# Patient Record
Sex: FEMALE | Race: WHITE | NOT HISPANIC OR LATINO | Employment: OTHER | URBAN - METROPOLITAN AREA
[De-identification: names, ages, dates, MRNs, and addresses within clinical notes are randomized per-mention and may not be internally consistent; named-entity substitution may affect disease eponyms.]

---

## 2023-10-31 ENCOUNTER — APPOINTMENT (OUTPATIENT)
Dept: CT IMAGING | Facility: HOSPITAL | Age: 78
End: 2023-10-31
Payer: MEDICARE

## 2023-10-31 ENCOUNTER — APPOINTMENT (OUTPATIENT)
Dept: GENERAL RADIOLOGY | Facility: HOSPITAL | Age: 78
End: 2023-10-31
Payer: MEDICARE

## 2023-10-31 ENCOUNTER — HOSPITAL ENCOUNTER (INPATIENT)
Facility: HOSPITAL | Age: 78
LOS: 6 days | Discharge: NURSING, REHAB OR LONG-TERM CARE | End: 2023-11-07
Attending: STUDENT IN AN ORGANIZED HEALTH CARE EDUCATION/TRAINING PROGRAM | Admitting: INTERNAL MEDICINE
Payer: MEDICARE

## 2023-10-31 DIAGNOSIS — I71.40 ABDOMINAL AORTIC ANEURYSM (AAA) WITHOUT RUPTURE, UNSPECIFIED PART: ICD-10-CM

## 2023-10-31 DIAGNOSIS — M54.16 LUMBAR RADICULOPATHY: ICD-10-CM

## 2023-10-31 DIAGNOSIS — R91.1 LUNG NODULE: ICD-10-CM

## 2023-10-31 DIAGNOSIS — R29.6 MULTIPLE FALLS: ICD-10-CM

## 2023-10-31 DIAGNOSIS — R29.898 BILATERAL LEG WEAKNESS: Primary | ICD-10-CM

## 2023-10-31 LAB
ALBUMIN SERPL-MCNC: 4.1 G/DL (ref 3.5–5.2)
ALBUMIN/GLOB SERPL: 1.5 G/DL
ALP SERPL-CCNC: 88 U/L (ref 39–117)
ALT SERPL W P-5'-P-CCNC: 15 U/L (ref 1–33)
ANION GAP SERPL CALCULATED.3IONS-SCNC: 9.3 MMOL/L (ref 5–15)
AST SERPL-CCNC: 25 U/L (ref 1–32)
BACTERIA UR QL AUTO: ABNORMAL /HPF
BASOPHILS # BLD AUTO: 0.04 10*3/MM3 (ref 0–0.2)
BASOPHILS NFR BLD AUTO: 0.4 % (ref 0–1.5)
BILIRUB SERPL-MCNC: 0.6 MG/DL (ref 0–1.2)
BILIRUB UR QL STRIP: NEGATIVE
BUN SERPL-MCNC: 21 MG/DL (ref 8–23)
BUN/CREAT SERPL: 25.9 (ref 7–25)
CALCIUM SPEC-SCNC: 10.5 MG/DL (ref 8.6–10.5)
CHLORIDE SERPL-SCNC: 102 MMOL/L (ref 98–107)
CLARITY UR: CLEAR
CO2 SERPL-SCNC: 26.7 MMOL/L (ref 22–29)
COLOR UR: YELLOW
CREAT SERPL-MCNC: 0.81 MG/DL (ref 0.57–1)
DEPRECATED RDW RBC AUTO: 43.4 FL (ref 37–54)
EGFRCR SERPLBLD CKD-EPI 2021: 74.4 ML/MIN/1.73
EOSINOPHIL # BLD AUTO: 0 10*3/MM3 (ref 0–0.4)
EOSINOPHIL NFR BLD AUTO: 0 % (ref 0.3–6.2)
ERYTHROCYTE [DISTWIDTH] IN BLOOD BY AUTOMATED COUNT: 13.4 % (ref 12.3–15.4)
GLOBULIN UR ELPH-MCNC: 2.8 GM/DL
GLUCOSE SERPL-MCNC: 145 MG/DL (ref 65–99)
GLUCOSE UR STRIP-MCNC: NEGATIVE MG/DL
HCT VFR BLD AUTO: 45.7 % (ref 34–46.6)
HGB BLD-MCNC: 15.3 G/DL (ref 12–15.9)
HGB UR QL STRIP.AUTO: ABNORMAL
HYALINE CASTS UR QL AUTO: ABNORMAL /LPF
IMM GRANULOCYTES # BLD AUTO: 0.04 10*3/MM3 (ref 0–0.05)
IMM GRANULOCYTES NFR BLD AUTO: 0.4 % (ref 0–0.5)
KETONES UR QL STRIP: NEGATIVE
LEUKOCYTE ESTERASE UR QL STRIP.AUTO: NEGATIVE
LYMPHOCYTES # BLD AUTO: 0.78 10*3/MM3 (ref 0.7–3.1)
LYMPHOCYTES NFR BLD AUTO: 7.6 % (ref 19.6–45.3)
MCH RBC QN AUTO: 29.7 PG (ref 26.6–33)
MCHC RBC AUTO-ENTMCNC: 33.5 G/DL (ref 31.5–35.7)
MCV RBC AUTO: 88.7 FL (ref 79–97)
MONOCYTES # BLD AUTO: 0.8 10*3/MM3 (ref 0.1–0.9)
MONOCYTES NFR BLD AUTO: 7.8 % (ref 5–12)
NEUTROPHILS NFR BLD AUTO: 8.57 10*3/MM3 (ref 1.7–7)
NEUTROPHILS NFR BLD AUTO: 83.8 % (ref 42.7–76)
NITRITE UR QL STRIP: NEGATIVE
NRBC BLD AUTO-RTO: 0 /100 WBC (ref 0–0.2)
PH UR STRIP.AUTO: 6.5 [PH] (ref 5–8)
PLATELET # BLD AUTO: 160 10*3/MM3 (ref 140–450)
PMV BLD AUTO: 11.7 FL (ref 6–12)
POTASSIUM SERPL-SCNC: 4.1 MMOL/L (ref 3.5–5.2)
PROT SERPL-MCNC: 6.9 G/DL (ref 6–8.5)
PROT UR QL STRIP: ABNORMAL
QT INTERVAL: 386 MS
QTC INTERVAL: 459 MS
RBC # BLD AUTO: 5.15 10*6/MM3 (ref 3.77–5.28)
RBC # UR STRIP: ABNORMAL /HPF
REF LAB TEST METHOD: ABNORMAL
SODIUM SERPL-SCNC: 138 MMOL/L (ref 136–145)
SP GR UR STRIP: 1.02 (ref 1–1.03)
SQUAMOUS #/AREA URNS HPF: ABNORMAL /HPF
UROBILINOGEN UR QL STRIP: ABNORMAL
WBC # UR STRIP: ABNORMAL /HPF
WBC NRBC COR # BLD: 10.23 10*3/MM3 (ref 3.4–10.8)

## 2023-10-31 PROCEDURE — 99285 EMERGENCY DEPT VISIT HI MDM: CPT

## 2023-10-31 PROCEDURE — 73562 X-RAY EXAM OF KNEE 3: CPT

## 2023-10-31 PROCEDURE — G0378 HOSPITAL OBSERVATION PER HR: HCPCS

## 2023-10-31 PROCEDURE — 81001 URINALYSIS AUTO W/SCOPE: CPT | Performed by: STUDENT IN AN ORGANIZED HEALTH CARE EDUCATION/TRAINING PROGRAM

## 2023-10-31 PROCEDURE — 73502 X-RAY EXAM HIP UNI 2-3 VIEWS: CPT

## 2023-10-31 PROCEDURE — 80053 COMPREHEN METABOLIC PANEL: CPT | Performed by: STUDENT IN AN ORGANIZED HEALTH CARE EDUCATION/TRAINING PROGRAM

## 2023-10-31 PROCEDURE — 72125 CT NECK SPINE W/O DYE: CPT

## 2023-10-31 PROCEDURE — 72131 CT LUMBAR SPINE W/O DYE: CPT

## 2023-10-31 PROCEDURE — 71275 CT ANGIOGRAPHY CHEST: CPT

## 2023-10-31 PROCEDURE — 85025 COMPLETE CBC W/AUTO DIFF WBC: CPT | Performed by: STUDENT IN AN ORGANIZED HEALTH CARE EDUCATION/TRAINING PROGRAM

## 2023-10-31 PROCEDURE — 93010 ELECTROCARDIOGRAM REPORT: CPT | Performed by: INTERNAL MEDICINE

## 2023-10-31 PROCEDURE — 25510000001 IOPAMIDOL PER 1 ML: Performed by: STUDENT IN AN ORGANIZED HEALTH CARE EDUCATION/TRAINING PROGRAM

## 2023-10-31 PROCEDURE — 70450 CT HEAD/BRAIN W/O DYE: CPT

## 2023-10-31 PROCEDURE — 93005 ELECTROCARDIOGRAM TRACING: CPT | Performed by: STUDENT IN AN ORGANIZED HEALTH CARE EDUCATION/TRAINING PROGRAM

## 2023-10-31 PROCEDURE — 74174 CTA ABD&PLVS W/CONTRAST: CPT

## 2023-10-31 PROCEDURE — 72128 CT CHEST SPINE W/O DYE: CPT

## 2023-10-31 RX ORDER — BISACODYL 5 MG/1
5 TABLET, DELAYED RELEASE ORAL DAILY PRN
Status: DISCONTINUED | OUTPATIENT
Start: 2023-10-31 | End: 2023-11-07 | Stop reason: HOSPADM

## 2023-10-31 RX ORDER — BISACODYL 10 MG
10 SUPPOSITORY, RECTAL RECTAL DAILY PRN
Status: DISCONTINUED | OUTPATIENT
Start: 2023-10-31 | End: 2023-11-07 | Stop reason: HOSPADM

## 2023-10-31 RX ORDER — ACETAMINOPHEN 325 MG/1
650 TABLET ORAL EVERY 4 HOURS PRN
Status: DISCONTINUED | OUTPATIENT
Start: 2023-10-31 | End: 2023-11-01

## 2023-10-31 RX ORDER — ACETAMINOPHEN 160 MG/5ML
650 SOLUTION ORAL EVERY 4 HOURS PRN
Status: DISCONTINUED | OUTPATIENT
Start: 2023-10-31 | End: 2023-11-01

## 2023-10-31 RX ORDER — ACETAMINOPHEN 650 MG/1
650 SUPPOSITORY RECTAL EVERY 4 HOURS PRN
Status: DISCONTINUED | OUTPATIENT
Start: 2023-10-31 | End: 2023-11-01

## 2023-10-31 RX ORDER — ONDANSETRON 2 MG/ML
4 INJECTION INTRAMUSCULAR; INTRAVENOUS EVERY 6 HOURS PRN
Status: DISCONTINUED | OUTPATIENT
Start: 2023-10-31 | End: 2023-11-07 | Stop reason: HOSPADM

## 2023-10-31 RX ORDER — POLYETHYLENE GLYCOL 3350 17 G/17G
17 POWDER, FOR SOLUTION ORAL DAILY PRN
Status: DISCONTINUED | OUTPATIENT
Start: 2023-10-31 | End: 2023-11-07 | Stop reason: HOSPADM

## 2023-10-31 RX ORDER — AMOXICILLIN 250 MG
2 CAPSULE ORAL 2 TIMES DAILY
Status: DISCONTINUED | OUTPATIENT
Start: 2023-10-31 | End: 2023-11-07 | Stop reason: HOSPADM

## 2023-10-31 RX ADMIN — IOPAMIDOL 95 ML: 755 INJECTION, SOLUTION INTRAVENOUS at 16:12

## 2023-10-31 NOTE — Clinical Note
Level of Care: Med/Surg [1]   Diagnosis: Multiple falls [082911]   Admitting Physician: MIREILLE SUNSHINE [[id number]]   Attending Physician: MIREILLE SUNSHINE [[id number]]   Certification: I Certify That Inpatient Hospital Services Are Medically Necessary For Greater Than 2 Midnights

## 2023-10-31 NOTE — ED TRIAGE NOTES
Patient to ED per EMS from home w/ reports of 3 times throughout the night; hit head on dresser, abrasion to L shin, reports back pain. Patient denies blood thinner use, nausea. Patient states she is unsure if she had a syncopal episode last night; reports increased weakness over the last several months.

## 2023-10-31 NOTE — ED PROVIDER NOTES
EMERGENCY DEPARTMENT ENCOUNTER    Room Number:  39/39  PCP: Provider, No Known  History obtained from: Patient, family      HPI:  Chief Complaint: Falls  A complete HPI/ROS/PMH/PSH/SH/FH are unobtainable due to: Not applicable  Context: Kirti Santillan is a 77 y.o. female who presents to the ED c/o multiple falls.  Had 3 falls today.  Thinks she lost her balance.  Family reports she has been increasingly weak and having difficulty ambulating even with a walker.  Denies chest pain or shortness of breath.  Does report hitting her head and diffuse back pain.  Also left knee and left hip pain.            PAST MEDICAL HISTORY  Active Ambulatory Problems     Diagnosis Date Noted    No Active Ambulatory Problems     Resolved Ambulatory Problems     Diagnosis Date Noted    No Resolved Ambulatory Problems     No Additional Past Medical History         PAST SURGICAL HISTORY  No past surgical history on file.      FAMILY HISTORY  No family history on file.      SOCIAL HISTORY  Social History     Socioeconomic History    Marital status:          ALLERGIES  Patient has no known allergies.        REVIEW OF SYSTEMS    As per HPI      PHYSICAL EXAM  ED Triage Vitals [10/31/23 1253]   Temp Heart Rate Resp BP SpO2   98.9 °F (37.2 °C) 86 20 106/77 97 %      Temp src Heart Rate Source Patient Position BP Location FiO2 (%)   -- -- -- -- --       Physical Exam  Constitutional:       General: She is not in acute distress.  HENT:      Head: Normocephalic and atraumatic.   Cardiovascular:      Rate and Rhythm: Normal rate and regular rhythm.   Pulmonary:      Effort: Pulmonary effort is normal. No respiratory distress.   Abdominal:      General: There is no distension.      Palpations: Abdomen is soft.      Tenderness: There is no abdominal tenderness.   Musculoskeletal:         General: Tenderness present. No swelling or deformity.      Comments: Tenderness to palpation over bilateral hips, left greater than right and left knee.   Also diffuse tenderness over the spines including cervical, thoracic, lumbar.  Worst over the thoracic spine.   Skin:     General: Skin is warm and dry.   Neurological:      Mental Status: She is alert. Mental status is at baseline.           Vital signs and nursing notes reviewed.          LAB RESULTS  Recent Results (from the past 24 hour(s))   Comprehensive Metabolic Panel    Collection Time: 10/31/23  1:02 PM    Specimen: Blood   Result Value Ref Range    Glucose 145 (H) 65 - 99 mg/dL    BUN 21 8 - 23 mg/dL    Creatinine 0.81 0.57 - 1.00 mg/dL    Sodium 138 136 - 145 mmol/L    Potassium 4.1 3.5 - 5.2 mmol/L    Chloride 102 98 - 107 mmol/L    CO2 26.7 22.0 - 29.0 mmol/L    Calcium 10.5 8.6 - 10.5 mg/dL    Total Protein 6.9 6.0 - 8.5 g/dL    Albumin 4.1 3.5 - 5.2 g/dL    ALT (SGPT) 15 1 - 33 U/L    AST (SGOT) 25 1 - 32 U/L    Alkaline Phosphatase 88 39 - 117 U/L    Total Bilirubin 0.6 0.0 - 1.2 mg/dL    Globulin 2.8 gm/dL    A/G Ratio 1.5 g/dL    BUN/Creatinine Ratio 25.9 (H) 7.0 - 25.0    Anion Gap 9.3 5.0 - 15.0 mmol/L    eGFR 74.4 >60.0 mL/min/1.73   CBC Auto Differential    Collection Time: 10/31/23  1:02 PM    Specimen: Blood   Result Value Ref Range    WBC 10.23 3.40 - 10.80 10*3/mm3    RBC 5.15 3.77 - 5.28 10*6/mm3    Hemoglobin 15.3 12.0 - 15.9 g/dL    Hematocrit 45.7 34.0 - 46.6 %    MCV 88.7 79.0 - 97.0 fL    MCH 29.7 26.6 - 33.0 pg    MCHC 33.5 31.5 - 35.7 g/dL    RDW 13.4 12.3 - 15.4 %    RDW-SD 43.4 37.0 - 54.0 fl    MPV 11.7 6.0 - 12.0 fL    Platelets 160 140 - 450 10*3/mm3    Neutrophil % 83.8 (H) 42.7 - 76.0 %    Lymphocyte % 7.6 (L) 19.6 - 45.3 %    Monocyte % 7.8 5.0 - 12.0 %    Eosinophil % 0.0 (L) 0.3 - 6.2 %    Basophil % 0.4 0.0 - 1.5 %    Immature Grans % 0.4 0.0 - 0.5 %    Neutrophils, Absolute 8.57 (H) 1.70 - 7.00 10*3/mm3    Lymphocytes, Absolute 0.78 0.70 - 3.10 10*3/mm3    Monocytes, Absolute 0.80 0.10 - 0.90 10*3/mm3    Eosinophils, Absolute 0.00 0.00 - 0.40 10*3/mm3    Basophils,  Absolute 0.04 0.00 - 0.20 10*3/mm3    Immature Grans, Absolute 0.04 0.00 - 0.05 10*3/mm3    nRBC 0.0 0.0 - 0.2 /100 WBC   ECG 12 Lead Syncope    Collection Time: 10/31/23  3:19 PM   Result Value Ref Range    QT Interval 386 ms    QTC Interval 459 ms   Urinalysis With Culture If Indicated - Urine, Clean Catch    Collection Time: 10/31/23  3:32 PM    Specimen: Urine, Clean Catch   Result Value Ref Range    Color, UA Yellow Yellow, Straw    Appearance, UA Clear Clear    pH, UA 6.5 5.0 - 8.0    Specific Gravity, UA 1.022 1.005 - 1.030    Glucose, UA Negative Negative    Ketones, UA Negative Negative    Bilirubin, UA Negative Negative    Blood, UA Moderate (2+) (A) Negative    Protein, UA 30 mg/dL (1+) (A) Negative    Leuk Esterase, UA Negative Negative    Nitrite, UA Negative Negative    Urobilinogen, UA 1.0 E.U./dL 0.2 - 1.0 E.U./dL       Ordered the above labs and reviewed the results.        RADIOLOGY  CT Head Without Contrast, CT Cervical Spine Without Contrast, CT Thoracic Spine Without Contrast, CT Lumbar Spine Without Contrast    Result Date: 10/31/2023  CT HEAD, CERVICAL/THORACIC/LUMBAR SPINE WITHOUT CONTRAST  HISTORY: Fall, pain in above areas.  COMPARISON: None.  CT HEAD WITHOUT CONTRAST:  The brain and ventricles are symmetrical. There is no evidence of intracranial hemorrhage, hydrocephalus or of abnormal extra-axial fluid. Moderate small vessel ischemic disease is noted. A lacunar infarct is appreciated involving the thalamic nuclei bilaterally each measuring approximately 5 mm in size, age indeterminate. A lacunar infarct involving the body of the caudate nucleus on the right is appreciated measuring approximately 7 mm in diameter. No obvious area of decreased attenuation to suggest an acute infarction is identified.      There is no evidence of fracture or of intracranial hemorrhage. Atrophy, small vessel ischemic disease and age-indeterminate lacunar infarcts involving the thalamic nuclei are noted  bilaterally. Further evaluation could be performed with a MRI examination of the brain as indicated.  CT EXAMINATAION OF THE CERVICAL SPINE WITHOUT CONTRAST:  Moderate-to-severe degenerative disease is noted at C1-C2 anteriorly. A moderate pannus is noted posterior to the dens. There is fusion of the facets to the left at C2-3. There is a grade 1 anterolisthesis of C4 upon C5 estimated to be 2 mm. Moderate-to-severe loss of disc height and moderate endplate degenerative changes are noted at C6-7. There is no evidence of prevertebral edema or of fracture.  C2-3: There is no evidence of disc bulge or herniation.  C3-4: A mild central disc bulge is present. Moderate facet degenerative disease is present bilaterally.  C4-5: Moderate-to-severe facet degenerative disease is present on the right. Moderate foraminal stenosis is present on the right secondary to facet hypertrophy and uncovertebral degenerative disease.  C5-6: There is no evidence of disc bulge or herniation.  C6-7: Beam-hardening artifact from the patient's shoulders limits evaluation of the cervical spine and spinal canal. A broad-based disc osteophyte complex is present resulting in flattening of the ventral surface of the thecal sac.  C7-T1: There is no evidence of disc bulge or herniation.  IMPRESSION: Beam-hardening artifact limits evaluation of the lower cervical spine. Multilevel degenerative disease involving the cervical spine is noted as described above. There is no evidence of fracture. See above.  CT EXAMINATION OF THE THORACIC SPINE WITHOUT CONTRAST:  There is a spiculated nodule appreciated involving the right upper lobe posterolaterally measuring approximately 7 mm in size. The lungs are only partially visualized.  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 Vacuum disc effect is appreciated from T11 to L1. There is no evidence of a compression fracture or of compression deformity. Evaluation of the spinal canal is limited by  technique but no obvious disc herniation is appreciated.  IMPRESSION: 1.  Multilevel degenerative disease involving the thoracic spine is noted as described above. This includes anterior ankylosis from T4 to T11. No obvious fracture is identified. Further evaluation could be performed with MRI examination of the a thoracic spine as indicated. 2.  There is partial visualization of the lungs. However, there is a spiculated nodule involving the right upper lobe posterolaterally measuring 7 mm in size. Comparison with prior studies is recommended. If no prior study is available for comparison, a dedicated CT examination of the chest is strongly recommended.  CT EXAMINATION OF THE LUMBAR SPINE WITHOUT CONTRAST:  There is a large fusiform infrarenal abdominal aortic aneurysm appreciated measuring approximately 4.1 cm in the AP dimension and 4.6 cm in THE transverse dimension. 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 This measures approximately 4 mm in the AP dimension, 14 mm in the craniocaudal dimension and 13 mm in the transverse dimension.  The adrenal glands are prominent bilaterally more so on the left and low in attenuation consistent with adrenal adenomas.  There is severe loss of disc height at L4-L5. Anterolisthesis of L3 upon L4 is appreciated estimated to be approximately 4 mm.  L1-L2: A broad-based disc osteophyte complex is present resulting in mild flattening of the ventral surface of the thecal sac.  L2-L3: There is moderate-to-severe central canal stenosis and severe lateral recess narrowing bilaterally secondary to anterolisthesis of L2 upon L3 (estimated to be 4 mm), moderate facet degenerative disease and a broad-based disc osteophyte complex.  L3-L4: There is severe central canal stenosis and lateral recess narrowing bilaterally secondary to moderate-to-severe facet degenerative disease, anterolisthesis of L4 upon L5 and a broad-based disc  osteophyte complex. Disc material extends into the neural foramen contributing to mild foraminal stenosis bilaterally.  L4-L5: There is moderate canal stenosis, moderate lateral recess narrowing on the right and severe lateral recess narrowing on the left secondary to a broad-based disc osteophyte complex which is more prominent to the left. Moderate-to-severe foraminal stenosis is present bilaterally secondary to loss of disc height and extension of a disc osteophyte complex into the neural foramen.  L5-S1: Transitional anatomy is appreciated consisting of sacralization of L5. There is no evidence of central canal stenosis.  IMPRESSION: 3.  Transitional anatomy is appreciated consisting of sacralization of L5. Careful correlation prior to any intervention is required given the presence of transitional anatomy. 4.  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 There is severe lateral recess narrowing to the left at L4-L5, bilaterally at L3-L4 and bilaterally at L2-L3. See above. 5.  There is a fusiform infrarenal abdominal aortic aneurysm measuring approximately 4.1 x 4.6 cm in the transverse dimension. Along the anterior and inferior aspect of the aneurysm is a focal protuberance which projects beyond the calcified lumen suggesting a shallow pseudoaneurysm. It measures approximately 4 mm in the AP dimension, 14 mm in craniocaudal dimension and 13 mm in the transverse dimension.  The above information was called to and discussed with Dr. Thornton.    Radiation dose reduction techniques were utilized, including automated exposure control and exposure modulation based on body size.   This report was finalized on 10/31/2023 3:40 PM by Dr. Ron Moseley M.D on Workstation: [id number]      XR Hip With or Without Pelvis 2 - 3 View Left    Result Date:  10/31/2023  EXAM: XR HIP W OR WO PELVIS 2-3 VIEW LEFT-  INDICATION: Left hip pain status post fall  COMPARISON: None available       No fracture. Normal alignment. Hip joint spaces maintained. Likely calcified uterine fibroid.    This report was finalized on 10/31/2023 1:35 PM by Dr. Joey Ojeda M.D on Workstation: [id number]      XR Knee 3 View Left    Result Date: 10/31/2023  EXAM: XR KNEE 3 VW LEFT-  INDICATION: Left knee pain post fall  COMPARISON: None available       No fracture. Normal alignment. Mild medial compartment narrowing. No joint effusion. Arteriovascular calcifications.    This report was finalized on 10/31/2023 1:33 PM by Dr. Joey Ojeda M.D on Workstation: [id number]       Ordered the above noted radiological studies. Reviewed by me in PACS.            PROCEDURES  Procedures        MEDICATIONS GIVEN IN ER  Medications - No data to display            MEDICAL DECISION MAKING, PROGRESS, and CONSULTS    MDM: Patient presented the emergency department status post multiple falls, otherwise well-appearing, vitals otherwise stable.  Unclear etiology.  Per family patient has had progressive weakness and difficulty with ambulation.  Labs reassuring, imaging demonstrating no evidence of traumatic injury however does demonstrate spiculated nodule in the lungs and abdominal aortic aneurysm.  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  Additional imaging ordered for further evaluation.  Discussed with inpatient team, will admit for further management evaluation of her symptoms.    All labs have been independently reviewed by me.  All radiology studies have been reviewed by me and I have also reviewed the radiology report.   EKG's independently viewed and interpreted by me.  Discussion below represents my analysis of pertinent findings related to patient's condition, differential diagnosis, treatment plan and final disposition.      Additional sources:  - Discussed/ obtained information from  independent historians: Obtained additional history from family    - External (non-ED) record review:     - Chronic or social conditions impacting care: Previous tobacco use    - Shared decision making: Discussed plan for admission, patient agrees.      Orders placed during this visit:  Orders Placed This Encounter   Procedures    CT Head Without Contrast    CT Cervical Spine Without Contrast    CT Thoracic Spine Without Contrast    CT Lumbar Spine Without Contrast    XR Knee 3 View Left    XR Hip With or Without Pelvis 2 - 3 View Left    CT Angiogram Chest    CT Angiogram Abdomen Pelvis    Comprehensive Metabolic Panel    CBC Auto Differential    Urinalysis With Culture If Indicated -    Urinalysis, Microscopic Only - Urine, Clean Catch    [facility] (on-call MD unless specified) Details    ECG 12 Lead Syncope    Inpatient Admission    CBC & Differential         Additional orders considered but not ordered:  Considered additional cardiac testing however patient has no chest pain or shortness of breath.        Differential diagnosis includes but is not limited to:    Fracture, contusion, strain, lung cancer, impending aortic aneurysm rupture      Independent interpretation of labs, radiology studies, and discussions with consultants:  ED Course as of 10/31/23 1551   Tue Oct 31, 2023   1422 CT head interpreted myself:  No large hemorrhage or midline shift. [FS]   1531 EKG interpreted myself:  1519, sinus rhythm rate of 85, no acute ST segment changes or T wave inversions. [FS]      ED Course User Index  [FS] Yogi Thornton MD           DIAGNOSIS  Final diagnoses:   Bilateral leg weakness   Lung nodule   Abdominal aortic aneurysm (AAA) without rupture, unspecified part   Multiple falls         DISPOSITION  Admitted to [facility]        Latest Documented Vital Signs:  As of 15:51 EDT  BP- 140/74 HR- 81 Temp- 98.9 °F (37.2 °C) O2 sat- 94%              --    Please note that portions of this were completed with a voice  recognition program.       Note Disclaimer: At [facility], we believe that sharing information builds trust and better relationships. You are receiving this note because you are receiving care at [facility] or recently visited. It is possible you will see health information before a provider has talked with you about it. This kind of information can be easy to misunderstand. To help you fully understand what it means for your health, we urge you to discuss this note with your provider.             Yogi Thornton MD  10/31/23 [id number]

## 2023-11-01 ENCOUNTER — APPOINTMENT (OUTPATIENT)
Dept: MRI IMAGING | Facility: HOSPITAL | Age: 78
End: 2023-11-01
Payer: MEDICARE

## 2023-11-01 PROBLEM — I71.40 ABDOMINAL ANEURYSM: Status: ACTIVE | Noted: 2023-11-01

## 2023-11-01 PROBLEM — M54.9 CHRONIC BACK PAIN: Status: ACTIVE | Noted: 2023-11-01

## 2023-11-01 PROBLEM — G89.29 CHRONIC BACK PAIN: Status: ACTIVE | Noted: 2023-11-01

## 2023-11-01 LAB
ANION GAP SERPL CALCULATED.3IONS-SCNC: 7 MMOL/L (ref 5–15)
BASOPHILS # BLD AUTO: 0.04 10*3/MM3 (ref 0–0.2)
BASOPHILS NFR BLD AUTO: 0.6 % (ref 0–1.5)
BUN SERPL-MCNC: 17 MG/DL (ref 8–23)
BUN/CREAT SERPL: 20.7 (ref 7–25)
CALCIUM SPEC-SCNC: 10.2 MG/DL (ref 8.6–10.5)
CHLORIDE SERPL-SCNC: 101 MMOL/L (ref 98–107)
CO2 SERPL-SCNC: 29 MMOL/L (ref 22–29)
CREAT SERPL-MCNC: 0.82 MG/DL (ref 0.57–1)
DEPRECATED RDW RBC AUTO: 42.1 FL (ref 37–54)
EGFRCR SERPLBLD CKD-EPI 2021: 73.3 ML/MIN/1.73
EOSINOPHIL # BLD AUTO: 0.02 10*3/MM3 (ref 0–0.4)
EOSINOPHIL NFR BLD AUTO: 0.3 % (ref 0.3–6.2)
ERYTHROCYTE [DISTWIDTH] IN BLOOD BY AUTOMATED COUNT: 13.1 % (ref 12.3–15.4)
GLUCOSE SERPL-MCNC: 112 MG/DL (ref 65–99)
HCT VFR BLD AUTO: 43 % (ref 34–46.6)
HGB BLD-MCNC: 14.4 G/DL (ref 12–15.9)
IMM GRANULOCYTES # BLD AUTO: 0.03 10*3/MM3 (ref 0–0.05)
IMM GRANULOCYTES NFR BLD AUTO: 0.4 % (ref 0–0.5)
LYMPHOCYTES # BLD AUTO: 1.18 10*3/MM3 (ref 0.7–3.1)
LYMPHOCYTES NFR BLD AUTO: 16.7 % (ref 19.6–45.3)
MCH RBC QN AUTO: 29.4 PG (ref 26.6–33)
MCHC RBC AUTO-ENTMCNC: 33.5 G/DL (ref 31.5–35.7)
MCV RBC AUTO: 87.8 FL (ref 79–97)
MONOCYTES # BLD AUTO: 0.92 10*3/MM3 (ref 0.1–0.9)
MONOCYTES NFR BLD AUTO: 13 % (ref 5–12)
NEUTROPHILS NFR BLD AUTO: 4.87 10*3/MM3 (ref 1.7–7)
NEUTROPHILS NFR BLD AUTO: 69 % (ref 42.7–76)
NRBC BLD AUTO-RTO: 0 /100 WBC (ref 0–0.2)
PLATELET # BLD AUTO: 148 10*3/MM3 (ref 140–450)
PMV BLD AUTO: 11.5 FL (ref 6–12)
POTASSIUM SERPL-SCNC: 4 MMOL/L (ref 3.5–5.2)
RBC # BLD AUTO: 4.9 10*6/MM3 (ref 3.77–5.28)
SODIUM SERPL-SCNC: 137 MMOL/L (ref 136–145)
TSH SERPL DL<=0.05 MIU/L-ACNC: 3.4 UIU/ML (ref 0.27–4.2)
WBC NRBC COR # BLD: 7.06 10*3/MM3 (ref 3.4–10.8)

## 2023-11-01 PROCEDURE — 36415 COLL VENOUS BLD VENIPUNCTURE: CPT | Performed by: INTERNAL MEDICINE

## 2023-11-01 PROCEDURE — G0378 HOSPITAL OBSERVATION PER HR: HCPCS

## 2023-11-01 PROCEDURE — 97162 PT EVAL MOD COMPLEX 30 MIN: CPT

## 2023-11-01 PROCEDURE — 97530 THERAPEUTIC ACTIVITIES: CPT

## 2023-11-01 PROCEDURE — 97166 OT EVAL MOD COMPLEX 45 MIN: CPT

## 2023-11-01 PROCEDURE — 99204 OFFICE O/P NEW MOD 45 MIN: CPT | Performed by: NURSE PRACTITIONER

## 2023-11-01 PROCEDURE — 25010000002 ENOXAPARIN PER 10 MG: Performed by: STUDENT IN AN ORGANIZED HEALTH CARE EDUCATION/TRAINING PROGRAM

## 2023-11-01 PROCEDURE — 85025 COMPLETE CBC W/AUTO DIFF WBC: CPT | Performed by: INTERNAL MEDICINE

## 2023-11-01 PROCEDURE — 25010000002 KETOROLAC TROMETHAMINE PER 15 MG: Performed by: STUDENT IN AN ORGANIZED HEALTH CARE EDUCATION/TRAINING PROGRAM

## 2023-11-01 PROCEDURE — 80048 BASIC METABOLIC PNL TOTAL CA: CPT | Performed by: INTERNAL MEDICINE

## 2023-11-01 PROCEDURE — 72148 MRI LUMBAR SPINE W/O DYE: CPT

## 2023-11-01 PROCEDURE — 97110 THERAPEUTIC EXERCISES: CPT

## 2023-11-01 PROCEDURE — 84443 ASSAY THYROID STIM HORMONE: CPT | Performed by: STUDENT IN AN ORGANIZED HEALTH CARE EDUCATION/TRAINING PROGRAM

## 2023-11-01 RX ORDER — PANTOPRAZOLE SODIUM 40 MG/1
40 TABLET, DELAYED RELEASE ORAL
Status: DISCONTINUED | OUTPATIENT
Start: 2023-11-01 | End: 2023-11-07 | Stop reason: HOSPADM

## 2023-11-01 RX ORDER — ACETAMINOPHEN 500 MG
1000 TABLET ORAL EVERY 8 HOURS PRN
Status: DISCONTINUED | OUTPATIENT
Start: 2023-11-01 | End: 2023-11-07 | Stop reason: HOSPADM

## 2023-11-01 RX ORDER — OXYCODONE HYDROCHLORIDE AND ACETAMINOPHEN 5; 325 MG/1; MG/1
1 TABLET ORAL EVERY 6 HOURS PRN
Status: DISCONTINUED | OUTPATIENT
Start: 2023-11-01 | End: 2023-11-07 | Stop reason: HOSPADM

## 2023-11-01 RX ORDER — KETOROLAC TROMETHAMINE 15 MG/ML
15 INJECTION, SOLUTION INTRAMUSCULAR; INTRAVENOUS ONCE
Status: COMPLETED | OUTPATIENT
Start: 2023-11-01 | End: 2023-11-01

## 2023-11-01 RX ORDER — ENOXAPARIN SODIUM 100 MG/ML
40 INJECTION SUBCUTANEOUS NIGHTLY
Status: DISCONTINUED | OUTPATIENT
Start: 2023-11-01 | End: 2023-11-02

## 2023-11-01 RX ADMIN — ACETAMINOPHEN 1000 MG: 500 TABLET ORAL at 20:58

## 2023-11-01 RX ADMIN — OXYCODONE HYDROCHLORIDE AND ACETAMINOPHEN 1 TABLET: 5; 325 TABLET ORAL at 17:38

## 2023-11-01 RX ADMIN — KETOROLAC TROMETHAMINE 15 MG: 15 INJECTION, SOLUTION INTRAMUSCULAR; INTRAVENOUS at 15:51

## 2023-11-01 RX ADMIN — PANTOPRAZOLE SODIUM 40 MG: 40 TABLET, DELAYED RELEASE ORAL at 15:52

## 2023-11-01 RX ADMIN — ENOXAPARIN SODIUM 40 MG: 100 INJECTION SUBCUTANEOUS at 20:57

## 2023-11-01 NOTE — PLAN OF CARE
Goal Outcome Evaluation:patient came to hospital for eval/tx of mobility, injuries 2nd to current history of 3 falls at home , 1 with hit to her head. 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 Patient has been alert and oriented x4 and able to make all needs known verbally. Patient is receiving mri at this time for report of increased back pain and new med orders 2nd to this also. Neuro /pt/ot following patient . Plan is for patient to discharge and has current pcp md appointment on Monday, November 6,2023 and then for home health to follow patient at home. Family is declining nursing home placement for this patient at this time.

## 2023-11-01 NOTE — PLAN OF CARE
Goal Outcome Evaluation:            Pt pleasant, using purewick due to unsteadiness and frequent falls.  VSS, pain denied this shift; resituates and weight shifts in bed.  No BM this shift.            Problem: Adult Inpatient Plan of Care  Goal: Plan of Care Review  Outcome: Ongoing, Progressing  Goal: Absence of Hospital-Acquired Illness or Injury  Outcome: Ongoing, Progressing  Intervention: Identify and Manage Fall Risk  Description: Perform standard risk assessment on admission using a validated tool or comprehensive approach appropriate to the patient; reassess fall risk frequently, with change in status or transfer to another level of care.  Communicate fall injury risk to interprofessional healthcare team.  Determine need for increased observation, equipment and environmental modification, such as low bed, signage and supportive, nonskid footwear.  Adjust safety measures to individual developmental age, stage and identified risk factors.  Reinforce the importance of safety and physical activity with patient and family.  Perform regular intentional rounding to assess need for position change, pain assessment and personal needs, including assistance with toileting.  Recent Flowsheet Documentation  Taken 11/1/2023 0522 by Fe Azul, RN  Safety Promotion/Fall Prevention: safety round/check completed  Taken 11/1/2023 0355 by Fe Azul, RN  Safety Promotion/Fall Prevention: safety round/check completed  Taken 11/1/2023 0153 by Fe Azul, RN  Safety Promotion/Fall Prevention: safety round/check completed  Taken 11/1/2023 0016 by Fe Azul, RN  Safety Promotion/Fall Prevention: safety round/check completed  Intervention: Prevent Skin Injury  Description: Perform a screening for skin injury risk, such as pressure or moisture associated skin damage on admission and at regular intervals throughout hospital stay.  Keep all areas of skin (especially folds) clean and dry.  Maintain adequate skin  hydration.  Relieve and redistribute pressure and protect bony prominences; implement measures based on patient-specific risk factors.  Match turning and repositioning schedule to clinical condition.  Encourage weight shift frequently; assist with reposition if unable to complete independently.  Float heels off bed; avoid pressure on the Achilles tendon.  Keep skin free from extended contact with medical devices.  Encourage functional activity and mobility, as early as tolerated.  Use aids (e.g., slide boards, mechanical lift) during transfer.  Recent Flowsheet Documentation  Taken 11/1/2023 0522 by eF Azul RN  Body Position:   weight shifting   left   tilted  Taken 11/1/2023 0355 by Fe Azul RN  Body Position: position changed independently  Taken 11/1/2023 0153 by Fe Azul RN  Body Position:   sitting up in bed   position changed independently  Taken 11/1/2023 0016 by Fe Azul RN  Body Position:   sitting up in bed   position changed independently  Skin Protection:   adhesive use limited   transparent dressing maintained   tubing/devices free from skin contact  Intervention: Prevent and Manage VTE (Venous Thromboembolism) Risk  Description: Assess for VTE (venous thromboembolism) risk.  Encourage and assist with early ambulation.  Initiate and maintain compression or other therapy, as indicated, based on identified risk in accordance with organizational protocol and provider order.  Encourage both active and passive leg exercises while in bed, if unable to ambulate.  Recent Flowsheet Documentation  Taken 11/1/2023 0016 by Fe Azul, RN  Activity Management: bedrest  Intervention: Prevent Infection  Description: Maintain skin and mucous membrane integrity; promote hand, oral and pulmonary hygiene.  Optimize fluid balance, nutrition, sleep and glycemic control to maximize infection resistance.  Identify potential sources of infection early to prevent or mitigate  progression of infection (e.g., wound, lines, devices).  Evaluate ongoing need for invasive devices; remove promptly when no longer indicated.  Recent Flowsheet Documentation  Taken 11/1/2023 0522 by Fe Azul, RN  Infection Prevention:   rest/sleep promoted   single patient room provided   visitors restricted/screened   personal protective equipment utilized   hand hygiene promoted  Taken 11/1/2023 0355 by Fe Azul RN  Infection Prevention:   rest/sleep promoted   visitors restricted/screened   single patient room provided   hand hygiene promoted   personal protective equipment utilized  Taken 11/1/2023 0153 by Fe Azul, RN  Infection Prevention:   visitors restricted/screened   single patient room provided   rest/sleep promoted   personal protective equipment utilized   hand hygiene promoted  Taken 11/1/2023 0016 by Fe Azul RN  Infection Prevention:   visitors restricted/screened   single patient room provided   rest/sleep promoted   personal protective equipment utilized   hand hygiene promoted  Goal: Optimal Comfort and Wellbeing  Outcome: Ongoing, Progressing  Intervention: Provide Person-Centered Care  Description: Use a family-focused approach to care.  Develop trust and rapport by proactively providing information, encouraging questions, addressing concerns and offering reassurance.  Acknowledge emotional response to hospitalization.  Recognize and utilize personal coping strategies.  Honor spiritual and cultural preferences.  Recent Flowsheet Documentation  Taken 11/1/2023 0016 by Fe Azul RN  Trust Relationship/Rapport:   care explained   choices provided   questions encouraged   reassurance provided   thoughts/feelings acknowledged   questions answered   empathic listening provided  Goal: Readiness for Transition of Care  Outcome: Ongoing, Progressing     Problem: Fall Injury Risk  Goal: Absence of Fall and Fall-Related Injury  Outcome: Ongoing,  Progressing  Intervention: Identify and Manage Contributors  Description: Develop a fall prevention plan with the patient and caregiver/family.  Provide reorientation, appropriate sensory stimulation and routines with changes in mental status to decrease risk of fall.  Promote use of personal vision and auditory aids.  Assess assistance level required for safe and effective self-care; provide support as needed, such as toileting, mobilization. For age 65 and older, implement timed toileting with assistance.  Encourage physical activity, such as performance of mobility and self-care at highest level of patient ability, multicomponent exercise program and provision of appropriate assistive devices.  If fall occurs, assess the severity of injury; implement fall injury protocol. Determine the cause and revise fall injury prevention plan.  Regularly review medication contribution to fall risk; adjust medication administration times to minimize risk of falling.  Consider risk related to polypharmacy and age.  Balance adequate pain management with potential for oversedation.  Recent Flowsheet Documentation  Taken 11/1/2023 0522 by Fe Azul, RN  Medication Review/Management: medications reviewed  Taken 11/1/2023 0355 by Fe Azul, RN  Medication Review/Management: medications reviewed  Taken 11/1/2023 0153 by Fe Azul, RN  Medication Review/Management: medications reviewed  Taken 11/1/2023 0016 by Fe Azul, RN  Medication Review/Management: medications reviewed  Self-Care Promotion:   independence encouraged   BADL personal objects within reach   safe use of adaptive equipment encouraged  Intervention: Promote Injury-Free Environment  Description: Provide a safe, barrier-free environment that encourages independent activity.  Keep care area uncluttered and well-lighted.  Determine need for increased observation or monitoring.  Avoid use of devices that minimize mobility, such as restraints or  indwelling urinary catheter.  Recent Flowsheet Documentation  Taken 11/1/2023 0522 by Fe Azul, RN  Safety Promotion/Fall Prevention: safety round/check completed  Taken 11/1/2023 0355 by Fe Azul, RN  Safety Promotion/Fall Prevention: safety round/check completed  Taken 11/1/2023 0153 by Fe Azul, RN  Safety Promotion/Fall Prevention: safety round/check completed  Taken 11/1/2023 0016 by Fe Azul, RN  Safety Promotion/Fall Prevention: safety round/check completed

## 2023-11-01 NOTE — THERAPY EVALUATION
Patient Name: Kirti Santillan  : 1945    MRN: [id number]                              Today's Date: 2023       Admit Date: 10/31/2023    Visit Dx:     ICD-10-CM ICD-9-CM   1. Bilateral leg weakness  R29.898 729.89   2. Lung nodule  R91.1 793.11   3. Abdominal aortic aneurysm (AAA) without rupture, unspecified part  I71.40 441.4   4. Multiple falls  R29.6 V15.88     Patient Active Problem List   Diagnosis    Multiple falls     Past Medical History:   Diagnosis Date    Sciatica of left side      Past Surgical History:   Procedure Laterality Date    EYE SURGERY        General Information       Row Name 23 1034          Physical Therapy Time and Intention    Document Type evaluation  -PC     Mode of Treatment physical therapy  -PC       Row Name 23 1034          General Information    Prior Level of Function independent:;all household mobility;ADL's  -PC     Existing Precautions/Restrictions fall  -PC     Barriers to Rehab medically complex  -PC       Row Name 23 1034          Living Environment    People in Home spouse  -PC       Row Name 23 1034          Home Main Entrance    Number of Stairs, Main Entrance none  ramp  -PC       Row Name 23 1034          Stairs Within Home, Primary    Number of Stairs, Within Home, Primary two  -PC     Stairs Comment, Within Home, Primary 2 stairs to family room  -PC       Row Name 23 1034          Cognition    Orientation Status (Cognition) oriented x 3  -PC       Row Name 23 1034          Safety Issues, Functional Mobility    Safety Issues Affecting Function (Mobility) insight into deficits/self-awareness;positioning of assistive device  -PC     Impairments Affecting Function (Mobility) balance;endurance/activity tolerance;strength  -PC               User Key  (r) = Recorded By, (t) = Taken By, (c) = Cosigned By      Initials Name Provider Type    PC Hortensia Purcell PT Physical Therapist                   Mobility       Row  Name 11/01/23 1036          Bed Mobility    Supine-Sit [place] (Bed Mobility) moderate assist (50% patient effort)  -PC     Sit-Supine [place] (Bed Mobility) moderate assist (50% patient effort)  -PC       Row Name 11/01/23 1036          Sit-Stand Transfer    Sit-Stand [place] (Transfers) minimum assist (75% patient effort)  -PC     Assistive Device (Sit-Stand Transfers) walker, front-wheeled  -PC       Row Name 11/01/23 1036          Gait/Stairs (Locomotion)    [place] Level (Gait) minimum assist (75% patient effort)  -PC     Assistive Device (Gait) walker, front-wheeled  -PC     Distance in Feet (Gait) 40  -PC     Deviations/Abnormal Patterns (Gait) base of support, narrow;davon decreased;festinating/shuffling;stride length decreased;gait speed decreased  -PC     Comment, (Gait/Stairs) poor gait quality with forward lean, shuffling steps, able to correct with verbal cues for a few steps, walker too far out in front of pt, pt needed constant hands on assist and verbal cues to prevent fall  -PC               User Key  (r) = Recorded By, (t) = Taken By, (c) = Cosigned By      Initials Name Provider Type    PC Hortensia Purcell, PT Physical Therapist                   Obj/Interventions       Row Name 11/01/23 1038          Range of Motion Comprehensive    General Range of Motion bilateral lower extremity ROM WNL  -PC       Row Name 11/01/23 1038          Strength Comprehensive (MMT)    Comment, General Manual Muscle Testing (MMT) Assessment no focal deficits with B LEs to MMT with DF, knee ext, hip flex  -PC       Row Name 11/01/23 1038          Balance    Balance Assessment sitting static balance;sitting dynamic balance;standing static balance;standing dynamic balance  -PC     Static Sitting Balance standby assist  -PC     Dynamic Sitting Balance standby assist  -PC     Position, Sitting Balance sitting edge of bed  -PC     Static Standing Balance minimal assist  -PC     Dynamic Standing Balance  minimal assist  -PC     Position/Device Used, Standing Balance walker, rolling  -PC               User Key  (r) = Recorded By, (t) = Taken By, (c) = Cosigned By      Initials Name Provider Type    PC Hortensia Purcell PT Physical Therapist                   Goals/Plan       Row Name 11/01/23 1043          Bed Mobility Goal 1 (PT)    Activity/Assistive Device (Bed Mobility Goal 1, PT) sit to supine/supine to sit  -PC     [place] Level/Cues Needed (Bed Mobility Goal 1, PT) supervision required  -PC     Time Frame (Bed Mobility Goal 1, PT) 2 weeks  -PC       Row Name 11/01/23 1043          Transfer Goal 1 (PT)    Activity/Assistive Device (Transfer Goal 1, PT) sit-to-stand/stand-to-sit  -PC     [place] Level/Cues Needed (Transfer Goal 1, PT) contact guard required  -PC     Time Frame (Transfer Goal 1, PT) 2 weeks  -PC       Row Name 11/01/23 1043          Gait Training Goal 1 (PT)    Activity/Assistive Device (Gait Training Goal 1, PT) gait (walking locomotion);assistive device use  -PC     [place] Level (Gait Training Goal 1, PT) contact guard required  -PC     Distance (Gait Training Goal 1, PT) 50 ft  -PC     Time Frame (Gait Training Goal 1, PT) 2 weeks  -PC       Row Name 11/01/23 1043          Therapy Assessment/Plan (PT)    Planned Therapy Interventions (PT) balance training;bed mobility training;gait training;transfer training;strengthening;motor coordination training  -PC               User Key  (r) = Recorded By, (t) = Taken By, (c) = Cosigned By      Initials Name Provider Type    PC Hortensia Purcell PT Physical Therapist                   Clinical Impression       Row Name 11/01/23 1039          Pain    Pretreatment Pain Rating 0/10 - no pain  -PC       Row Name 11/01/23 1039          Plan of Care Review    Plan of Care Reviewed With patient;spouse  -PC     Outcome Evaluation Pt is a 76 yo female adm with frequent falls, she lives with her spouse in a house with a ramp to enter, can stay on  one level except for 2 steps to family room, she uses a rolling walker. Pt presents with significant deficits with poor gait quality, impaired motor planning, impaired safety awareness, impaired functional mobiltiy, she needed constant hands on assist and verbal cues to prevent a fall while ambulating 40 ft with a rolling walker, she has a shuffling gait, forward lean, poor walker management with walker too far out in front of pt, she is at a high risk of falls, pt may benefit from short term SNF at discharge  -PC       Row Name 11/01/23 1039          Therapy Assessment/Plan (PT)    Rehab Potential (PT) fair, will monitor progress closely  -PC     Criteria for Skilled Interventions Met (PT) yes;meets criteria  -PC     Therapy Frequency (PT) 5 times/wk  -PC       Row Name 11/01/23 1039          Positioning and Restraints    Pre-Treatment Position in bed  -PC     Post Treatment Position bed  -PC     In Bed supine;call light within reach;encouraged to call for assist;exit alarm on;with family/caregiver  -PC               User Key  (r) = Recorded By, (t) = Taken By, (c) = Cosigned By      Initials Name Provider Type    PC Hortensia Purcell, PT Physical Therapist                   Outcome Measures       Row Name 11/01/23 1044 11/01/23 0016       How much help from another person do you currently need...    Turning from your back to your side while in flat bed without using bedrails? 3  -PC 3  -CW    Moving from lying on back to sitting on the side of a flat bed without bedrails? 3  -PC 3  -CW    Moving to and from a bed to a chair (including a wheelchair)? 3  -PC 3  -CW    Standing up from a chair using your arms (e.g., wheelchair, bedside chair)? 2  -PC 2  -CW    Climbing 3-5 steps with a railing? 2  -PC 2  -CW    To walk in hospital room? 2  -PC 2  -CW    AM-PAC 6 Clicks Score (PT) 15  -PC 15  -CW    Highest level of mobility 4 --> Transferred to chair/commode  -PC 4 --> Transferred to chair/commode  -CW               User Key  (r) = Recorded By, (t) = Taken By, (c) = Cosigned By      Initials Name Provider Type    PC Hortensia Purcell, PT Physical Therapist    Fe Gomez RN Registered Nurse                                 Physical Therapy Education       Title: PT OT SLP Therapies (In Progress)       Topic: Physical Therapy (In Progress)       Point: Mobility training (Done)       Learning Progress Summary             Patient Acceptance, E,D, DU,NR by  at 11/1/2023 1044   Family Acceptance, E,D, DU,NR by  at 11/1/2023 1044                         Point: Home exercise program (Not Started)       Learner Progress:  Not documented in this visit.              Point: Body mechanics (Not Started)       Learner Progress:  Not documented in this visit.              Point: Precautions (Not Started)       Learner Progress:  Not documented in this visit.                              User Key       Initials Effective Dates Name Provider Type [facility] 06/16/21 -  Hortensia Purcell PT Physical Therapist PT                  PT Recommendation and Plan  Planned Therapy Interventions (PT): balance training, bed mobility training, gait training, transfer training, strengthening, motor coordination training  Plan of Care Reviewed With: patient, spouse  Outcome Evaluation: Pt is a 78 yo female adm with frequent falls, she lives with her spouse in a house with a ramp to enter, can stay on one level except for 2 steps to family room, she uses a rolling walker. Pt presents with significant deficits with poor gait quality, impaired motor planning, impaired safety awareness, impaired functional mobiltiy, she needed constant hands on assist and verbal cues to prevent a fall while ambulating 40 ft with a rolling walker, she has a shuffling gait, forward lean, poor walker management with walker too far out in front of pt, she is at a high risk of falls, pt may benefit from short term SNF at discharge     Time Calculation:         PT  Charges       Row Name 11/01/23 1045             Time Calculation    Start Time 0930  -PC      Stop Time 0944  -PC      Time Calculation (min) 14 min  -PC      PT Received On 11/01/23  -PC      PT - Next Appointment 11/02/23  -PC      PT Goal Re-Cert Due Date 11/15/23  -PC         Time Calculation- PT    Total Timed Code Minutes- PT 10 minute(s)  -PC                User Key  (r) = Recorded By, (t) = Taken By, (c) = Cosigned By      Initials Name Provider Type    PC Hortensia Purcell, PT Physical Therapist                  Therapy Charges for Today       Code Description Service Date Service Provider Modifiers Qty    12275797226 HC PT EVAL MOD COMPLEXITY 2 11/1/2023 Hortensia Purcell, PT GP 1    24732396193 HC PT THER PROC EA 15 MIN 11/1/2023 Hortensia Purcell, PT GP 1            PT G-Codes  AM-PAC 6 Clicks Score (PT): 15  PT Discharge Summary  Anticipated Discharge Disposition (PT): skilled nursing facility    Hortensia Purcell PT  11/1/2023

## 2023-11-01 NOTE — CASE MANAGEMENT/SOCIAL WORK
Continued Stay Note  [facility]     Patient Name: Kirti Santillan  MRN: [id number]  Today's Date: 11/1/2023    Admit Date: 10/31/2023    Plan: Home with [facility] with [facility] as back-up   Discharge Plan       Row Name 11/01/23 1353       Plan    Plan Home with [facility] with [facility] as back-up    Plan Comments Pt's son Robinson said he made his mother a PCP appointment for 11/6 with Dr Deangelo Tiwari.   Eliz Rodriguez RN      Row Name 11/01/23 1250       Plan    Plan Pt wants to return home at discharge, she is agreeable to home health however she does not have a PCP      Row Name 11/01/23 1247       Plan    Patient/Family in Agreement with Plan yes    Provided Post Acute Provider List? Yes    Post Acute Provider List Home Health;Nursing Home    Delivered To Patient    Method of Delivery In person                   Discharge Codes    No documentation.                       Eliz Rodriguez, RN

## 2023-11-01 NOTE — PROGRESS NOTES
"    Name: Kirti Santillan ADMIT: 10/31/2023   : 1945  PCP: Provider, No Known    MRN: [id number] LOS: 1 days   AGE/SEX: 77 y.o. female  ROOM: 126/1     Subjective   Subjective   Laying in bed, spouse present at bedside.  No new complaints.,  Continues to complain of lower extremity weakness.  Reports chronic back pain as well, takes as needed Advil for pain.      Review of Systems   As above  Objective   Objective   Vital Signs  Temp:  [97.7 °F (36.5 °C)-98.9 °F (37.2 °C)] 97.7 °F (36.5 °C)  Heart Rate:  [74-89] 74  Resp:  [18-20] 18  BP: (106-167)/(72-94) 160/80  SpO2:  [94 %-97 %] 95 %  on   ;   Device (Oxygen Therapy): room air  Body mass index is 30.77 kg/m².  Physical Exam    General: Alert, oriented x3, not in distress,  HEENT: Normocephalic, atraumatic  CV: Regular rate and rhythm, no murmurs rubs or gallops  Lungs: Clear to auscultation bilaterally, no crackles or wheezes  Abdomen: Soft, nontender, nondistended  Extremities: Trace edema lower extremities, weakness, limited range of motion due to pain        Results Review     I reviewed the patient's new clinical results.  Results from last 7 days   Lab Units 23  0649 10/31/23  1302   WBC 10*3/mm3 7.06 10.23   HEMOGLOBIN g/dL 14.4 15.3   PLATELETS 10*3/mm3 148 160     Results from last 7 days   Lab Units 23  0649 10/31/23  1302   SODIUM mmol/L 137 138   POTASSIUM mmol/L 4.0 4.1   CHLORIDE mmol/L 101 102   CO2 mmol/L 29.0 26.7   BUN mg/dL 17 21   CREATININE mg/dL 0.82 0.81   GLUCOSE mg/dL 112* 145*   Estimated Creatinine Clearance: 56.2 mL/min (by C-G formula based on SCr of 0.82 mg/dL).  Results from last 7 days   Lab Units 10/31/23  1302   ALBUMIN g/dL 4.1   BILIRUBIN mg/dL 0.6   ALK PHOS U/L 88   AST (SGOT) U/L 25   ALT (SGPT) U/L 15     Results from last 7 days   Lab Units 23  0649 10/31/23  1302   CALCIUM mg/dL 10.2 10.5   ALBUMIN g/dL  --  4.1     No results found for: \"COVID19\"  No results found for: \"HGBA1C\", " "\"POCGLU\"        CT Angiogram Chest, CT Angiogram Abdomen Pelvis  Narrative: EXAMINATION: CT OF THE CHEST, ABDOMEN AND PELVIS WITH CONTRAST- CTA     TECHNIQUE: Computed tomography of the chest, abdomen and pelvis after  the uneventful administration of nonionic intravenous contrast per CTA  protocol. Radiation dose reduction techniques were utilized, including  automated exposure control and exposure modulation based on body size.  3D reconstructions were performed     HISTORY: Abdominal aortic aneurysm and possible pseudoaneurysm     COMPARISON: None available     FINDINGS: The thoracic aorta is normal in caliber. There is mild to  moderate atherosclerotic plaque. No dissection is seen. The great  vessels of the neck are normal.     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 An infrarenal abdominal  aortic aneurysm measures 4.1 cm anteroposteriorly by 4.3 cm  mediolaterally on sagittal and coronal images respectively. 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 Mild stenosis is seen  of the right renal artery. The main left renal artery is patent. There  is a slightly delayed nephrogram on the left. The ROSE, common, external  iliac, common femoral, and visualized femoral and profunda femoris  arteries are patent. The right internal iliac artery is patent. There is  mild stenosis of the origin of the left internal iliac artery.     CHEST:  Dependent atelectasis is seen in the lungs along with emphysematous  changes. A right upper lobe nodule measures 5 mm on series 1, image 61.  No consolidation, pleural effusion, or pneumothorax are seen. There are  punctate additional pulmonary nodules. The central airways are patent.     The heart is normal in size and configuration, without pericardial  effusion. Coronary calcifications are seen. No enlarged supraclavicular,  axillary, mediastinal, or hilar lymph nodes are demonstrated.     Abdomen and pelvis:     There is left adrenal thickening, likely " adenomatous. A tiny  low-attenuation left renal lesion is technically indeterminate, likely a  cyst. There is a calcified uterine fibroid. Colonic diverticula are  seen, without evidence of acute diverticulitis. Pancreatic calcification  likely reflects chronic pancreatitis.     The liver, gallbladder, spleen,, stomach, small bowel, urinary bladder,  appendix are normal. No intraperitoneal fluid collection or free gas are  seen. No enlarged lymph nodes are demonstrated.     Bone windows demonstrate degenerative changes, without suspicious  osseous lesion seen.     Impression: 1. Infrarenal abdominal aortic 4.1 x 4.3 cm aneurysm. No pseudoaneurysm  seen.     2. 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 Right upper lobe pulmonary nodule. Consider 6-month follow-up if  clinically indicated     4. Additional incidental findings as above.     This report was finalized on 10/31/2023 4:36 PM by Dr. Fito Cat M.D  on Workstation: [id number]     CT Head Without Contrast, CT Cervical Spine Without Contrast, CT Thoracic Spine Without Contrast, CT Lumbar Spine Without Contrast  Narrative: CT HEAD, CERVICAL/THORACIC/LUMBAR SPINE WITHOUT CONTRAST     HISTORY: Fall, pain in above areas.     COMPARISON: None.     CT HEAD WITHOUT CONTRAST:     The brain and ventricles are symmetrical. There is no evidence of  intracranial hemorrhage, hydrocephalus or of abnormal extra-axial fluid.  Moderate small vessel ischemic disease is noted. A lacunar infarct is  appreciated involving the thalamic nuclei bilaterally each measuring  approximately 5 mm in size, age indeterminate. A lacunar infarct  involving the body of the caudate nucleus on the right is appreciated  measuring approximately 7 mm in diameter. No obvious area of decreased  attenuation to suggest an acute infarction is identified.     Impression: There is no evidence of fracture or of intracranial  hemorrhage. Atrophy, small vessel ischemic disease and  age-indeterminate  lacunar infarcts involving the thalamic nuclei are noted bilaterally.  Further evaluation could be performed with a MRI examination of the  brain as indicated.     CT EXAMINATAION OF THE CERVICAL SPINE WITHOUT CONTRAST:      Moderate-to-severe degenerative disease is noted at C1-C2 anteriorly. A  moderate pannus is noted posterior to the dens. There is fusion of the  facets to the left at C2-3. There is a grade 1 anterolisthesis of C4  upon C5 estimated to be 2 mm. Moderate-to-severe loss of disc height and  moderate endplate degenerative changes are noted at C6-7. There is no  evidence of prevertebral edema or of fracture.     C2-3: There is no evidence of disc bulge or herniation.     C3-4: A mild central disc bulge is present. Moderate facet degenerative  disease is present bilaterally.     C4-5: Moderate-to-severe facet degenerative disease is present on the  right. Moderate foraminal stenosis is present on the right secondary to  facet hypertrophy and uncovertebral degenerative disease.     C5-6: There is no evidence of disc bulge or herniation.     C6-7: Beam-hardening artifact from the patient's shoulders limits  evaluation of the cervical spine and spinal canal. A broad-based disc  osteophyte complex is present resulting in flattening of the ventral  surface of the thecal sac.     C7-T1: There is no evidence of disc bulge or herniation.     IMPRESSION: Beam-hardening artifact limits evaluation of the lower  cervical spine. Multilevel degenerative disease involving the cervical  spine is noted as described above. There is no evidence of fracture. See  above.     CT EXAMINATION OF THE THORACIC SPINE WITHOUT CONTRAST:      There is a spiculated nodule appreciated involving the right upper lobe  posterolaterally measuring approximately 7 mm in size. The lungs are  only partially visualized.     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 Vacuum disc  effect is appreciated from T11  to L1. There is no evidence of a  compression fracture or of compression deformity. Evaluation of the  spinal canal is limited by technique but no obvious disc herniation is  appreciated.     IMPRESSION:  1.  Multilevel degenerative disease involving the thoracic spine is  noted as described above. This includes anterior ankylosis from T4 to  T11. No obvious fracture is identified. Further evaluation could be  performed with MRI examination of the a thoracic spine as indicated.  2.  There is partial visualization of the lungs. However, there is a  spiculated nodule involving the right upper lobe posterolaterally  measuring 7 mm in size. Comparison with prior studies is recommended. If  no prior study is available for comparison, a dedicated CT examination  of the chest is strongly recommended.     CT EXAMINATION OF THE LUMBAR SPINE WITHOUT CONTRAST:      There is a large fusiform infrarenal abdominal aortic aneurysm  appreciated measuring approximately 4.1 cm in the AP dimension and 4.6  cm in THE transverse dimension. Inferiorly there is a more focal  irregularity and protuberance which likely represents a pseudoaneurysm.  This measures approximately 4 mm in the AP dimension, 14 mm in the  craniocaudal dimension and 13 mm in the transverse dimension.     The adrenal glands are prominent bilaterally more so on the left and low  in attenuation consistent with adrenal adenomas.     There is severe loss of disc height at L4-L5. Anterolisthesis of L3 upon  L4 is appreciated estimated to be approximately 4 mm.     L1-L2: A broad-based disc osteophyte complex is present resulting in  mild flattening of the ventral surface of the thecal sac.     L2-L3: There is moderate-to-severe central canal stenosis and severe  lateral recess narrowing bilaterally secondary to anterolisthesis of L2  upon L3 (estimated to be 4 mm), moderate facet degenerative disease and  a broad-based disc osteophyte complex.     L3-L4: There is severe  central canal stenosis and lateral recess  narrowing bilaterally secondary to moderate-to-severe facet degenerative  disease, anterolisthesis of L4 upon L5 and a broad-based disc osteophyte  complex. Disc material extends into the neural foramen contributing to  mild foraminal stenosis bilaterally.     L4-L5: There is moderate canal stenosis, moderate lateral recess  narrowing on the right and severe lateral recess narrowing on the left  secondary to a broad-based disc osteophyte complex which is more  prominent to the left. Moderate-to-severe foraminal stenosis is present  bilaterally secondary to loss of disc height and extension of a disc  osteophyte complex into the neural foramen.     L5-S1: Transitional anatomy is appreciated consisting of sacralization  of L5. There is no evidence of central canal stenosis.     IMPRESSION:  3.  Transitional anatomy is appreciated consisting of sacralization of  L5. Careful correlation prior to any intervention is required given the  presence of transitional anatomy.  4.  Multilevel degenerative disease involving the lumbar spine is noted  as described above including severe canal stenosis at L3-L4,  moderate-to-severe canal stenosis at L2-L3 and moderate canal stenosis  at L4-L5. There is severe lateral recess narrowing to the left at L4-L5,  bilaterally at L3-L4 and bilaterally at L2-L3. See above.  5.  There is a fusiform infrarenal abdominal aortic aneurysm measuring  approximately 4.1 x 4.6 cm in the transverse dimension. Along the  anterior and inferior aspect of the aneurysm is a focal protuberance  which projects beyond the calcified lumen suggesting a shallow  pseudoaneurysm. It measures approximately 4 mm in the AP dimension, 14  mm in craniocaudal dimension and 13 mm in the transverse dimension.     The above information was called to and discussed with Dr. Thornton.           Radiation dose reduction techniques were utilized, including automated  exposure control and  exposure modulation based on body size.        This report was finalized on 10/31/2023 3:40 PM by Dr. Ron Moseley M.D on Workstation: [id number]     XR Hip With or Without Pelvis 2 - 3 View Left  Narrative: EXAM: XR HIP W OR WO PELVIS 2-3 VIEW LEFT-     INDICATION: Left hip pain status post fall     COMPARISON: None available        Impression: No fracture. Normal alignment. Hip joint spaces maintained.  Likely calcified uterine fibroid.           This report was finalized on 10/31/2023 1:35 PM by Dr. Joey Ojeda M.D on Workstation: [id number]     XR Knee 3 View Left  Narrative: EXAM: XR KNEE 3 VW LEFT-     INDICATION: Left knee pain post fall     COMPARISON: None available        Impression: No fracture. Normal alignment. Mild medial compartment  narrowing. No joint effusion. Arteriovascular calcifications.           This report was finalized on 10/31/2023 1:33 PM by Dr. Joey Ojeda M.D on Workstation: [id number]       Scheduled Medications  enoxaparin, 40 mg, Subcutaneous, Nightly  senna-docusate sodium, 2 tablet, Oral, BID    Infusions  Pharmacy to Dose enoxaparin (LOVENOX),     Diet  Diet: Regular/House Diet; Texture: Regular Texture (IDDSI 7); Fluid Consistency: Thin (IDDSI 0)    I have personally reviewed     [x]  Laboratory   []  Microbiology   [x]  Radiology   []  EKG/Telemetry  []  Cardiology/Vascular   []  Pathology    []  Records       Assessment/Plan     Active Hospital Problems    Diagnosis  POA    **Multiple falls [R29.6]  Not Applicable    Abdominal aneurysm [I71.40]  Unknown    Chronic back pain [M54.9, G89.29]  Unknown      Resolved Hospital Problems   No resolved problems to display.       77 y.o. female admitted with Multiple falls.    Multiple falls/weakness.  PT OT, TSH normal.  Ordered B12.      Lung nodule: CT scan showed right upper lobe pulmonary nodule.  Will need 6-month follow-up CT scan beginning of May/2023.      Chronic back pain:.  CT of the cervical, thoracic and lumbar  spine showed multilevel degenerative disc disease. severe canal stenosis at L3-L4, moderate-to-severe canal stenosis at L2-L3 and moderate canal stenosis at L4-L5. There is severe lateral recess narrowing to the left at L4-L5, mm in the transverse dimension.  Certainly contributing to weakness as above.  We will consult neurosurgery.  Initiate on high-dose Tylenol as needed for pain.      Hyperglycemia: Ordered hemoglobin A1c      Abdominal aneurysm: CT scan showed infrarenal 4.3 cm abdominal aortic aneurysm. Vascular surgery evaluated..   6 weeks outpatient follow-up with abdominal ultrasound recommended.    Lovenox 40 mg SC daily for DVT prophylaxis.  Full code.  Discussed with patient and spouse.  Anticipate discharge to be determined      Copied text in this note has been reviewed and is accurate as of 11/01/23.         Dictated utilizing Dragon dictation        Jozef Winters MD  [facility]  11/01/23  12:34 EDT

## 2023-11-01 NOTE — PLAN OF CARE
Goal Outcome Evaluation:  Plan of Care Reviewed With: patient, spouse           Outcome Evaluation: Pt is a 76 yo female adm with frequent falls, she lives with her spouse in a house with a ramp to enter, can stay on one level except for 2 steps to family room, she uses a rolling walker. Pt presents with significant deficits with poor gait quality, impaired motor planning, impaired safety awareness, impaired functional mobiltiy, she needed constant hands on assist and verbal cues to prevent a fall while ambulating 40 ft with a rolling walker, she has a shuffling gait, forward lean, poor walker management with walker too far out in front of pt, she is at a high risk of falls, pt may benefit from short term SNF at discharge      Anticipated Discharge Disposition (PT): skilled nursing facility

## 2023-11-01 NOTE — CONSULTS
"[facility]   Consult Note    Patient Name: Kirti Santillan  : 1945  MRN: [id number]  Primary Care Physician:  Provider, No Known  Referring Physician: Hermelinda Martin MD  Date of admission: 10/31/2023    Inpatient Neurosurgery Consult  Consult performed by: Izzy Garner APRN  Consult ordered by: Jozef Winters MD  Reason for consult: Lumbar stenosis, leg weakness with falls        Subjective   Subjective     Reason for Consult/ Chief Complaint: Lumbar stenosis with history of falls, low back and left leg pain.      History of Present Illness    Kirti Santillan is a 77 y.o. obese female with no significant past medical history other than low back pain and left leg pain for which she had been seen as an outpatient at [facility] back in 2017. She was referred for physical therapy.  She has no history of prior lumbar surgery.  She has no other significant past medical history other than finding of a partially calcified fibroid to the left of the uterus, colonic diverticulosis, and an infrarenal abdominal aortic aneurysm which at that time it measured 3.4 cm.  The patient takes no medications at home.     The patient reports persistent problems with low back pain left greater than right with radiation down into the \"whole leg.\" She denies any numbness or tingling.  Currently she states she is having no pain.  The symptoms only occur when she is trying to ambulate or bear weight.  She notes a fairly long history of persistent difficulty with walking due to the above-mentioned symptoms.  She states that she has been using a walker for close to 2 years. She denies any bowel or bladder incontinence but also states that it is difficult to get to the bathroom in time due to back and left leg pain. The patient's  and son who are at bedside, states that the patient has had intermittent episodes of falls for about 2 years.  She has had 3 falls over the last few days.  Prior " to that her last fall was in February of this year.      The patient denies any associated dizziness, nausea, or vomiting.  She notes no episodes of syncope.  She denies any neck or arm pain.  She has been evaluated by Dr. Wislon with vascular surgery with regards to the 4.3 cm abdominal aortic aneurysm.  Their plan is to see the patient again in 6 months with a repeat ultrasound.  Since her presentation to the emergency department she is gone CT imaging of the cervical, thoracic and lumbar spine.  Surgery has been asked to evaluate and give recommendations with regards to findings of lumbar spinal stenosis.      Review of Systems   Constitutional:  Positive for activity change. Negative for chills, diaphoresis and fever.   HENT: Negative.     Eyes: Negative.    Respiratory: Negative.  Negative for cough and shortness of breath.    Cardiovascular: Negative.    Genitourinary: Negative.  Negative for difficulty urinating and urgency.   Musculoskeletal:  Positive for back pain. Negative for neck pain and neck stiffness.   Skin: Negative.    Allergic/Immunologic: Negative.    Neurological:  Positive for weakness. Negative for dizziness, light-headedness, numbness and headaches.   Hematological: Negative.    Psychiatric/Behavioral: Negative.          Personal History     Past Medical History:   Diagnosis Date    Sciatica of left side        Past Surgical History:   Procedure Laterality Date    EYE SURGERY         Family History: family history is not on file. Otherwise pertinent FHx was reviewed and not pertinent to current issue.    Social History:  reports that she has quit smoking. Her smoking use included cigarettes. She has never used smokeless tobacco. She reports that she does not currently use alcohol. She reports that she does not use drugs.    Home Medications:        Allergies:  Allergies   Allergen Reactions    Hydrocodone Nausea And Vomiting       Objective    Objective     Vitals:  Temp:  [97.7 °F (36.5  °C)-98.4 °F (36.9 °C)] 97.7 °F (36.5 °C)  Heart Rate:  [74-83] 74  Resp:  [18] 18  BP: (140-167)/(72-94) 160/80    Physical Exam  Vitals reviewed.   Constitutional:       General: She is not in acute distress.     Appearance: She is well-developed. She is obese. She is not ill-appearing, toxic-appearing or diaphoretic.   HENT:      Head: Normocephalic and atraumatic.      Nose: Nose normal.      Mouth/Throat:      Mouth: Mucous membranes are moist.      Pharynx: Oropharynx is clear.   Eyes:      General:         Right eye: No discharge.         Left eye: No discharge.      Extraocular Movements: Extraocular movements intact.      Conjunctiva/sclera: Conjunctivae normal.   Neck:      Trachea: No tracheal deviation.   Cardiovascular:      Rate and Rhythm: Normal rate.   Pulmonary:      Effort: Pulmonary effort is normal. No respiratory distress.   Abdominal:      General: There is no distension.      Palpations: Abdomen is soft.      Tenderness: There is no abdominal tenderness.   Musculoskeletal:         General: Swelling (mild bilateral lower extremity edema) present. No tenderness. Normal range of motion.      Cervical back: Normal range of motion and neck supple.   Skin:     General: Skin is warm and dry.      Findings: No erythema or rash.   Neurological:      Mental Status: She is alert and oriented to person, place, and time.      GCS: GCS eye subscore is 4. GCS verbal subscore is 5. GCS motor subscore is 6.      Sensory: No sensory deficit.      Motor: No weakness or abnormal muscle tone.      Coordination: Coordination normal.      Deep Tendon Reflexes: Reflexes are normal and symmetric. Reflexes normal.      Comments: No motor or sensory deficits. DTR's normal. Negative Flores's; negative clonus. SLR and Jh's negative bilaterally.  No findings of myelopathy on exam.  Gait not tested due to safety concern.      Psychiatric:         Behavior: Behavior is cooperative.         Thought Content: Thought  content normal.       Result Review    Result Review:  I have personally reviewed the results from the time of this admission to 11/1/2023 13:47 EDT and agree with these findings:  []  Laboratory list / accordion  []  Microbiology  [x]  Radiology  []  EKG/Telemetry   []  Cardiology/Vascular   []  Pathology  [x]  Old records  []  Other:  Most notable findings include:     CT HEAD WITHOUT CONTRAST: Negative for any acute intracranial abnormality.  No evidence of hydrocephalus.  Evidence of prior lacunar infarcts but no findings of acute infarct appreciated    CT EXAMINATAION OF THE CERVICAL SPINE WITHOUT CONTRAST: Moderate to severe degenerative change at C1-2 anteriorly with pannus formation posterior to the dens.  Millimeters of anterolisthesis of C4 on C5 with moderate to severe disc height loss and degenerative change in the endplates at C6-7.  There are multilevel arthritic changes with moderate to severe facet arthropathy, foraminal stenosis right greater than left at C4-5.  No evidence of fracture noted on cervical spine CT.     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  No evidence of fracture in the thoracic spine.  Incidental finding of a spiculated nodule in the right upper lobe of the lung measuring 7 mm in size.  Further work-up has been performed by the admitting service.    CT EXAMINATION OF THE LUMBAR SPINE WITHOUT CONTRAST:    There is evidence of sacralization at L5.  Multilevel degenerative changes as well as severe canal stenosis noted L3-4, moderate to severe canal stenosis L2-3, moderate canal stenosis at L4-5 with severe left greater than right lateral recess narrowing.  There is also severe bilateral lateral recess narrowing at L3-4 and L2-3.  The fusiform infrarenal abdominal aortic aneurysm was demonstrated on this study right now measuring 4.1 x 4.6 cm.  Please see the  "consultation note from Dr. Wilson with vascular surgery in regards to this finding.      Assessment & Plan   Assessment / Plan     Brief Patient Summary:  Kirti Santillan is a 77 y.o. female who has a history of increasing gait difficulty with falls stemming back approximately 2 years.  The patient has been using a walker for about that amount of time.  She fell approximately 3 times in the last 3 or 4 days.  Prior to that her last falls occurred in February of this year.  The patient complains of low back pain with some radiation into the \"whole left leg.\"  While lying down, the patient has no pain whatsoever.  She states that her pain is brought on by standing and walking.  Neurosurgery has been asked to evaluate give further recommendations following CT imaging of the cervical, thoracic and lumbar spine.    Active Hospital Problems:  Active Hospital Problems    Diagnosis     **Multiple falls     Abdominal aneurysm     Chronic back pain      Plan:     Check lumbar MRI  Recommend evaluation with PT/OT.    Izzy Garner, HUI    "

## 2023-11-01 NOTE — H&P
HISTORY AND PHYSICAL   [facility]        Date of Admission: 10/31/2023  Patient Identification:  Name: Kirti Santillan  Age: 77 y.o.  Sex: female  :  1945  MRN: [id number]                     Primary Care Physician: Provider, No Known    Chief Complaint:  77 year old female who presented to the emergency room after multiple falls at home; she uses a walker but has been falling event with trying to use it; she had three falls today; not sure about syncope; no family is present at this time; she denies fever or chills; she appears somewhat forgetful    History of Present Illness:   As above    Past Medical History:  Past Medical History:   Diagnosis Date    Sciatica of left side      Past Surgical History:  Past Surgical History:   Procedure Laterality Date    EYE SURGERY        Home Meds:  No medications prior to admission.       Allergies:  Allergies   Allergen Reactions    Hydrocodone Nausea And Vomiting     Immunizations:  Immunization History   Administered Date(s) Administered    COVID-19 (PFIZER) Purple Cap Monovalent 2021, 2021     Social History:   Social History     Social History Narrative    Not on file     Social History     Socioeconomic History    Marital status:    Tobacco Use    Smoking status: Former     Types: Cigarettes    Smokeless tobacco: Never   Vaping Use    Vaping Use: Never used   Substance and Sexual Activity    Alcohol use: Not Currently    Drug use: Never    Sexual activity: Defer       Family History:  History reviewed. No pertinent family history.     Review of Systems  See history of present illness and past medical history.  Patient denies headache, dizziness trauma, change in vision, change in hearing, change in taste, changes in weight, changes in appetite, focal weakness, numbness, or paresthesia.  Patient denies chest pain, palpitations, dyspnea, orthopnea, PND, cough, sinus pressure, rhinorrhea, epistaxis, hemoptysis, nausea,  "vomiting,hematemesis, diarrhea, constipation or hematochezia.  Denies cold or heat intolerance, polydipsia, polyuria, polyphagia. Denies hematuria, pyuria, dysuria, hesitancy, frequency or urgency. Denies consumption of raw and under cooked meats foods or change in water source.  Denies fever, chills, sweats, night sweats.      Objective:  T Max 24 hrs: Temp (24hrs), Av.7 °F (37.1 °C), Min:98.4 °F (36.9 °C), Max:98.9 °F (37.2 °C)    Vitals Ranges:   Temp:  [98.4 °F (36.9 °C)-98.9 °F (37.2 °C)] 98.4 °F (36.9 °C)  Heart Rate:  [74-89] 83  Resp:  [18-20] 18  BP: (106-167)/(72-94) 167/94      Exam:  /94 (BP Location: Right arm, Patient Position: Lying)   Pulse 83   Temp 98.4 °F (36.9 °C) (Oral)   Resp 18   Ht 160 cm (63\")   Wt 78.8 kg (173 lb 11.6 oz)   LMP  (LMP Unknown)   SpO2 94%   BMI 30.77 kg/m²     General Appearance:    Alert, cooperative, no distress, appears stated age   Head:    Normocephalic, without obvious abnormality, atraumatic   Eyes:    PERRL, conjunctivae/corneas clear, EOM's intact, both eyes   Ears:    Normal external ear canals, both ears   Nose:   Nares normal, septum midline, mucosa normal, no drainage    or sinus tenderness   Throat:   Lips, mucosa, and tongue normal   Neck:   Supple, symmetrical, trachea midline, no adenopathy;     thyroid:  no enlargement/tenderness/nodules; no carotid    bruit or JVD   Back:     Symmetric, no curvature, ROM normal, no CVA tenderness   Lungs:     Decreased breath sounds bilaterally, respirations unlabored   Chest Wall:    No tenderness or deformity    Heart:    Regular rate and rhythm, S1 and S2 normal, no murmur, rub   or gallop   Abdomen:     Soft, nontender, bowel sounds active all four quadrants,     no masses, no hepatomegaly, no splenomegaly   Extremities:   Extremities normal, atraumatic, no cyanosis or edema        Data Review:  Labs in chart were reviewed.  WBC   Date Value Ref Range Status   10/31/2023 10.23 3.40 - 10.80 10*3/mm3 " Final     Hemoglobin   Date Value Ref Range Status   10/31/2023 15.3 12.0 - 15.9 g/dL Final     Hematocrit   Date Value Ref Range Status   10/31/2023 45.7 34.0 - 46.6 % Final     Platelets   Date Value Ref Range Status   10/31/2023 160 140 - 450 10*3/mm3 Final     Sodium   Date Value Ref Range Status   10/31/2023 138 136 - 145 mmol/L Final     Potassium   Date Value Ref Range Status   10/31/2023 4.1 3.5 - 5.2 mmol/L Final     Chloride   Date Value Ref Range Status   10/31/2023 102 98 - 107 mmol/L Final     CO2   Date Value Ref Range Status   10/31/2023 26.7 22.0 - 29.0 mmol/L Final     BUN   Date Value Ref Range Status   10/31/2023 21 8 - 23 mg/dL Final     Creatinine   Date Value Ref Range Status   10/31/2023 0.81 0.57 - 1.00 mg/dL Final     Glucose   Date Value Ref Range Status   10/31/2023 145 (H) 65 - 99 mg/dL Final     Calcium   Date Value Ref Range Status   10/31/2023 10.5 8.6 - 10.5 mg/dL Final                Imaging Results (All)       Procedure Component Value Units Date/Time    CT Angiogram Chest [[id number]] Collected: 10/31/23 1627     Updated: 10/31/23 1639    Narrative:      EXAMINATION: CT OF THE CHEST, ABDOMEN AND PELVIS WITH CONTRAST- CTA     TECHNIQUE: Computed tomography of the chest, abdomen and pelvis after  the uneventful administration of nonionic intravenous contrast per CTA  protocol. Radiation dose reduction techniques were utilized, including  automated exposure control and exposure modulation based on body size.  3D reconstructions were performed     HISTORY: Abdominal aortic aneurysm and possible pseudoaneurysm     COMPARISON: None available     FINDINGS: The thoracic aorta is normal in caliber. There is mild to  moderate atherosclerotic plaque. No dissection is seen. The great  vessels of the neck are normal.     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 An infrarenal abdominal  aortic aneurysm measures 4.1 cm anteroposteriorly by 4.3 cm  mediolaterally on sagittal and coronal  images respectively. 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 Mild stenosis is seen  of the right renal artery. The main left renal artery is patent. There  is a slightly delayed nephrogram on the left. The ROSE, common, external  iliac, common femoral, and visualized femoral and profunda femoris  arteries are patent. The right internal iliac artery is patent. There is  mild stenosis of the origin of the left internal iliac artery.     CHEST:  Dependent atelectasis is seen in the lungs along with emphysematous  changes. A right upper lobe nodule measures 5 mm on series 1, image 61.  No consolidation, pleural effusion, or pneumothorax are seen. There are  punctate additional pulmonary nodules. The central airways are patent.     The heart is normal in size and configuration, without pericardial  effusion. Coronary calcifications are seen. No enlarged supraclavicular,  axillary, mediastinal, or hilar lymph nodes are demonstrated.     Abdomen and pelvis:     There is left adrenal thickening, likely adenomatous. A tiny  low-attenuation left renal lesion is technically indeterminate, likely a  cyst. There is a calcified uterine fibroid. Colonic diverticula are  seen, without evidence of acute diverticulitis. Pancreatic calcification  likely reflects chronic pancreatitis.     The liver, gallbladder, spleen,, stomach, small bowel, urinary bladder,  appendix are normal. No intraperitoneal fluid collection or free gas are  seen. No enlarged lymph nodes are demonstrated.     Bone windows demonstrate degenerative changes, without suspicious  osseous lesion seen.       Impression:      1. Infrarenal abdominal aortic 4.1 x 4.3 cm aneurysm. No pseudoaneurysm  seen.     2. 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 Right upper lobe pulmonary nodule. Consider 6-month follow-up if  clinically indicated     4. Additional incidental findings as above.     This report was finalized on 10/31/2023 4:36 PM by  Dr. Fito Cat M.D  on Workstation: [id number]       CT Angiogram Abdomen Pelvis [[id number]] Collected: 10/31/23 1627     Updated: 10/31/23 1639    Narrative:      EXAMINATION: CT OF THE CHEST, ABDOMEN AND PELVIS WITH CONTRAST- CTA     TECHNIQUE: Computed tomography of the chest, abdomen and pelvis after  the uneventful administration of nonionic intravenous contrast per CTA  protocol. Radiation dose reduction techniques were utilized, including  automated exposure control and exposure modulation based on body size.  3D reconstructions were performed     HISTORY: Abdominal aortic aneurysm and possible pseudoaneurysm     COMPARISON: None available     FINDINGS: The thoracic aorta is normal in caliber. There is mild to  moderate atherosclerotic plaque. No dissection is seen. The great  vessels of the neck are normal.     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 An infrarenal abdominal  aortic aneurysm measures 4.1 cm anteroposteriorly by 4.3 cm  mediolaterally on sagittal and coronal images respectively. 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 Mild stenosis is seen  of the right renal artery. The main left renal artery is patent. There  is a slightly delayed nephrogram on the left. The ROSE, common, external  iliac, common femoral, and visualized femoral and profunda femoris  arteries are patent. The right internal iliac artery is patent. There is  mild stenosis of the origin of the left internal iliac artery.     CHEST:  Dependent atelectasis is seen in the lungs along with emphysematous  changes. A right upper lobe nodule measures 5 mm on series 1, image 61.  No consolidation, pleural effusion, or pneumothorax are seen. There are  punctate additional pulmonary nodules. The central airways are patent.     The heart is normal in size and configuration, without pericardial  effusion. Coronary calcifications are seen. No enlarged supraclavicular,  axillary, mediastinal, or hilar lymph  nodes are demonstrated.     Abdomen and pelvis:     There is left adrenal thickening, likely adenomatous. A tiny  low-attenuation left renal lesion is technically indeterminate, likely a  cyst. There is a calcified uterine fibroid. Colonic diverticula are  seen, without evidence of acute diverticulitis. Pancreatic calcification  likely reflects chronic pancreatitis.     The liver, gallbladder, spleen,, stomach, small bowel, urinary bladder,  appendix are normal. No intraperitoneal fluid collection or free gas are  seen. No enlarged lymph nodes are demonstrated.     Bone windows demonstrate degenerative changes, without suspicious  osseous lesion seen.       Impression:      1. Infrarenal abdominal aortic 4.1 x 4.3 cm aneurysm. No pseudoaneurysm  seen.     2. 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 Right upper lobe pulmonary nodule. Consider 6-month follow-up if  clinically indicated     4. Additional incidental findings as above.     This report was finalized on 10/31/2023 4:36 PM by Dr. Fito Cat M.D  on Workstation: [id number]       CT Head Without Contrast [[id number]] Collected: 10/31/23 1503     Updated: 10/31/23 1543    Narrative:      CT HEAD, CERVICAL/THORACIC/LUMBAR SPINE WITHOUT CONTRAST     HISTORY: Fall, pain in above areas.     COMPARISON: None.     CT HEAD WITHOUT CONTRAST:     The brain and ventricles are symmetrical. There is no evidence of  intracranial hemorrhage, hydrocephalus or of abnormal extra-axial fluid.  Moderate small vessel ischemic disease is noted. A lacunar infarct is  appreciated involving the thalamic nuclei bilaterally each measuring  approximately 5 mm in size, age indeterminate. A lacunar infarct  involving the body of the caudate nucleus on the right is appreciated  measuring approximately 7 mm in diameter. No obvious area of decreased  attenuation to suggest an acute infarction is identified.       Impression:      There is no evidence of fracture or of  intracranial  hemorrhage. Atrophy, small vessel ischemic disease and age-indeterminate  lacunar infarcts involving the thalamic nuclei are noted bilaterally.  Further evaluation could be performed with a MRI examination of the  brain as indicated.     CT EXAMINATAION OF THE CERVICAL SPINE WITHOUT CONTRAST:      Moderate-to-severe degenerative disease is noted at C1-C2 anteriorly. A  moderate pannus is noted posterior to the dens. There is fusion of the  facets to the left at C2-3. There is a grade 1 anterolisthesis of C4  upon C5 estimated to be 2 mm. Moderate-to-severe loss of disc height and  moderate endplate degenerative changes are noted at C6-7. There is no  evidence of prevertebral edema or of fracture.     C2-3: There is no evidence of disc bulge or herniation.     C3-4: A mild central disc bulge is present. Moderate facet degenerative  disease is present bilaterally.     C4-5: Moderate-to-severe facet degenerative disease is present on the  right. Moderate foraminal stenosis is present on the right secondary to  facet hypertrophy and uncovertebral degenerative disease.     C5-6: There is no evidence of disc bulge or herniation.     C6-7: Beam-hardening artifact from the patient's shoulders limits  evaluation of the cervical spine and spinal canal. A broad-based disc  osteophyte complex is present resulting in flattening of the ventral  surface of the thecal sac.     C7-T1: There is no evidence of disc bulge or herniation.     IMPRESSION: Beam-hardening artifact limits evaluation of the lower  cervical spine. Multilevel degenerative disease involving the cervical  spine is noted as described above. There is no evidence of fracture. See  above.     CT EXAMINATION OF THE THORACIC SPINE WITHOUT CONTRAST:      There is a spiculated nodule appreciated involving the right upper lobe  posterolaterally measuring approximately 7 mm in size. The lungs are  only partially visualized.     There is ankylosis of the  thoracic spine from T4 to T11. Vacuum disc  effect is appreciated from T11 to L1. There is no evidence of a  compression fracture or of compression deformity. Evaluation of the  spinal canal is limited by technique but no obvious disc herniation is  appreciated.     IMPRESSION:  1.  Multilevel degenerative disease involving the thoracic spine is  noted as described above. This includes anterior ankylosis from T4 to  T11. No obvious fracture is identified. Further evaluation could be  performed with MRI examination of the a thoracic spine as indicated.  2.  There is partial visualization of the lungs. However, there is a  spiculated nodule involving the right upper lobe posterolaterally  measuring 7 mm in size. Comparison with prior studies is recommended. If  no prior study is available for comparison, a dedicated CT examination  of the chest is strongly recommended.     CT EXAMINATION OF THE LUMBAR SPINE WITHOUT CONTRAST:      There is a large fusiform infrarenal abdominal aortic aneurysm  appreciated measuring approximately 4.1 cm in the AP dimension and 4.6  cm in THE transverse dimension. Inferiorly there is a more focal  irregularity and protuberance which likely represents a pseudoaneurysm.  This measures approximately 4 mm in the AP dimension, 14 mm in the  craniocaudal dimension and 13 mm in the transverse dimension.     The adrenal glands are prominent bilaterally more so on the left and low  in attenuation consistent with adrenal adenomas.     There is severe loss of disc height at L4-L5. Anterolisthesis of L3 upon  L4 is appreciated estimated to be approximately 4 mm.     L1-L2: A broad-based disc osteophyte complex is present resulting in  mild flattening of the ventral surface of the thecal sac.     L2-L3: There is moderate-to-severe central canal stenosis and severe  lateral recess narrowing bilaterally secondary to anterolisthesis of L2  upon L3 (estimated to be 4 mm), moderate facet degenerative  disease and  a broad-based disc osteophyte complex.     L3-L4: There is severe central canal stenosis and lateral recess  narrowing bilaterally secondary to moderate-to-severe facet degenerative  disease, anterolisthesis of L4 upon L5 and a broad-based disc osteophyte  complex. Disc material extends into the neural foramen contributing to  mild foraminal stenosis bilaterally.     L4-L5: There is moderate canal stenosis, moderate lateral recess  narrowing on the right and severe lateral recess narrowing on the left  secondary to a broad-based disc osteophyte complex which is more  prominent to the left. Moderate-to-severe foraminal stenosis is present  bilaterally secondary to loss of disc height and extension of a disc  osteophyte complex into the neural foramen.     L5-S1: Transitional anatomy is appreciated consisting of sacralization  of L5. There is no evidence of central canal stenosis.     IMPRESSION:  3.  Transitional anatomy is appreciated consisting of sacralization of  L5. Careful correlation prior to any intervention is required given the  presence of transitional anatomy.  4.  Multilevel degenerative disease involving the lumbar spine is noted  as described above including severe canal stenosis at L3-L4,  moderate-to-severe canal stenosis at L2-L3 and moderate canal stenosis  at L4-L5. There is severe lateral recess narrowing to the left at L4-L5,  bilaterally at L3-L4 and bilaterally at L2-L3. See above.  5.  There is a fusiform infrarenal abdominal aortic aneurysm measuring  approximately 4.1 x 4.6 cm in the transverse dimension. Along the  anterior and inferior aspect of the aneurysm is a focal protuberance  which projects beyond the calcified lumen suggesting a shallow  pseudoaneurysm. It measures approximately 4 mm in the AP dimension, 14  mm in craniocaudal dimension and 13 mm in the transverse dimension.     The above information was called to and discussed with Dr. Thornton.           Radiation dose  reduction techniques were utilized, including automated  exposure control and exposure modulation based on body size.        This report was finalized on 10/31/2023 3:40 PM by Dr. Ron Moseley M.D on Workstation: [id number]       CT Cervical Spine Without Contrast [[id number]] Collected: 10/31/23 1503     Updated: 10/31/23 1543    Narrative:      CT HEAD, CERVICAL/THORACIC/LUMBAR SPINE WITHOUT CONTRAST     HISTORY: Fall, pain in above areas.     COMPARISON: None.     CT HEAD WITHOUT CONTRAST:     The brain and ventricles are symmetrical. There is no evidence of  intracranial hemorrhage, hydrocephalus or of abnormal extra-axial fluid.  Moderate small vessel ischemic disease is noted. A lacunar infarct is  appreciated involving the thalamic nuclei bilaterally each measuring  approximately 5 mm in size, age indeterminate. A lacunar infarct  involving the body of the caudate nucleus on the right is appreciated  measuring approximately 7 mm in diameter. No obvious area of decreased  attenuation to suggest an acute infarction is identified.       Impression:      There is no evidence of fracture or of intracranial  hemorrhage. Atrophy, small vessel ischemic disease and age-indeterminate  lacunar infarcts involving the thalamic nuclei are noted bilaterally.  Further evaluation could be performed with a MRI examination of the  brain as indicated.     CT EXAMINATAION OF THE CERVICAL SPINE WITHOUT CONTRAST:      Moderate-to-severe degenerative disease is noted at C1-C2 anteriorly. A  moderate pannus is noted posterior to the dens. There is fusion of the  facets to the left at C2-3. There is a grade 1 anterolisthesis of C4  upon C5 estimated to be 2 mm. Moderate-to-severe loss of disc height and  moderate endplate degenerative changes are noted at C6-7. There is no  evidence of prevertebral edema or of fracture.     C2-3: There is no evidence of disc bulge or herniation.     C3-4: A mild central disc bulge is present. Moderate  facet degenerative  disease is present bilaterally.     C4-5: Moderate-to-severe facet degenerative disease is present on the  right. Moderate foraminal stenosis is present on the right secondary to  facet hypertrophy and uncovertebral degenerative disease.     C5-6: There is no evidence of disc bulge or herniation.     C6-7: Beam-hardening artifact from the patient's shoulders limits  evaluation of the cervical spine and spinal canal. A broad-based disc  osteophyte complex is present resulting in flattening of the ventral  surface of the thecal sac.     C7-T1: There is no evidence of disc bulge or herniation.     IMPRESSION: Beam-hardening artifact limits evaluation of the lower  cervical spine. Multilevel degenerative disease involving the cervical  spine is noted as described above. There is no evidence of fracture. See  above.     CT EXAMINATION OF THE THORACIC SPINE WITHOUT CONTRAST:      There is a spiculated nodule appreciated involving the right upper lobe  posterolaterally measuring approximately 7 mm in size. The lungs are  only partially visualized.     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 Vacuum disc  effect is appreciated from T11 to L1. There is no evidence of a  compression fracture or of compression deformity. Evaluation of the  spinal canal is limited by technique but no obvious disc herniation is  appreciated.     IMPRESSION:  1.  Multilevel degenerative disease involving the thoracic spine is  noted as described above. This includes anterior ankylosis from T4 to  T11. No obvious fracture is identified. Further evaluation could be  performed with MRI examination of the a thoracic spine as indicated.  2.  There is partial visualization of the lungs. However, there is a  spiculated nodule involving the right upper lobe posterolaterally  measuring 7 mm in size. Comparison with prior studies is recommended. If  no prior study is available for comparison, a dedicated CT  examination  of the chest is strongly recommended.     CT EXAMINATION OF THE LUMBAR SPINE WITHOUT CONTRAST:      There is a large fusiform infrarenal abdominal aortic aneurysm  appreciated measuring approximately 4.1 cm in the AP dimension and 4.6  cm in THE transverse dimension. Inferiorly there is a more focal  irregularity and protuberance which likely represents a pseudoaneurysm.  This measures approximately 4 mm in the AP dimension, 14 mm in the  craniocaudal dimension and 13 mm in the transverse dimension.     The adrenal glands are prominent bilaterally more so on the left and low  in attenuation consistent with adrenal adenomas.     There is severe loss of disc height at L4-L5. Anterolisthesis of L3 upon  L4 is appreciated estimated to be approximately 4 mm.     L1-L2: A broad-based disc osteophyte complex is present resulting in  mild flattening of the ventral surface of the thecal sac.     L2-L3: There is moderate-to-severe central canal stenosis and severe  lateral recess narrowing bilaterally secondary to anterolisthesis of L2  upon L3 (estimated to be 4 mm), moderate facet degenerative disease and  a broad-based disc osteophyte complex.     L3-L4: There is severe central canal stenosis and lateral recess  narrowing bilaterally secondary to moderate-to-severe facet degenerative  disease, anterolisthesis of L4 upon L5 and a broad-based disc osteophyte  complex. Disc material extends into the neural foramen contributing to  mild foraminal stenosis bilaterally.     L4-L5: There is moderate canal stenosis, moderate lateral recess  narrowing on the right and severe lateral recess narrowing on the left  secondary to a broad-based disc osteophyte complex which is more  prominent to the left. Moderate-to-severe foraminal stenosis is present  bilaterally secondary to loss of disc height and extension of a disc  osteophyte complex into the neural foramen.     L5-S1: Transitional anatomy is appreciated consisting  of sacralization  of L5. There is no evidence of central canal stenosis.     IMPRESSION:  3.  Transitional anatomy is appreciated consisting of sacralization of  L5. Careful correlation prior to any intervention is required given the  presence of transitional anatomy.  4.  Multilevel degenerative disease involving the lumbar spine is noted  as described above including severe canal stenosis at L3-L4,  moderate-to-severe canal stenosis at L2-L3 and moderate canal stenosis  at L4-L5. There is severe lateral recess narrowing to the left at L4-L5,  bilaterally at L3-L4 and bilaterally at L2-L3. See above.  5.  There is a fusiform infrarenal abdominal aortic aneurysm measuring  approximately 4.1 x 4.6 cm in the transverse dimension. Along the  anterior and inferior aspect of the aneurysm is a focal protuberance  which projects beyond the calcified lumen suggesting a shallow  pseudoaneurysm. It measures approximately 4 mm in the AP dimension, 14  mm in craniocaudal dimension and 13 mm in the transverse dimension.     The above information was called to and discussed with Dr. Thornton.           Radiation dose reduction techniques were utilized, including automated  exposure control and exposure modulation based on body size.        This report was finalized on 10/31/2023 3:40 PM by Dr. Ron Moseley M.D on Workstation: [id number]       CT Thoracic Spine Without Contrast [[id number]] Collected: 10/31/23 1503     Updated: 10/31/23 1543    Narrative:      CT HEAD, CERVICAL/THORACIC/LUMBAR SPINE WITHOUT CONTRAST     HISTORY: Fall, pain in above areas.     COMPARISON: None.     CT HEAD WITHOUT CONTRAST:     The brain and ventricles are symmetrical. There is no evidence of  intracranial hemorrhage, hydrocephalus or of abnormal extra-axial fluid.  Moderate small vessel ischemic disease is noted. A lacunar infarct is  appreciated involving the thalamic nuclei bilaterally each measuring  approximately 5 mm in size, age indeterminate.  A lacunar infarct  involving the body of the caudate nucleus on the right is appreciated  measuring approximately 7 mm in diameter. No obvious area of decreased  attenuation to suggest an acute infarction is identified.       Impression:      There is no evidence of fracture or of intracranial  hemorrhage. Atrophy, small vessel ischemic disease and age-indeterminate  lacunar infarcts involving the thalamic nuclei are noted bilaterally.  Further evaluation could be performed with a MRI examination of the  brain as indicated.     CT EXAMINATAION OF THE CERVICAL SPINE WITHOUT CONTRAST:      Moderate-to-severe degenerative disease is noted at C1-C2 anteriorly. A  moderate pannus is noted posterior to the dens. There is fusion of the  facets to the left at C2-3. There is a grade 1 anterolisthesis of C4  upon C5 estimated to be 2 mm. Moderate-to-severe loss of disc height and  moderate endplate degenerative changes are noted at C6-7. There is no  evidence of prevertebral edema or of fracture.     C2-3: There is no evidence of disc bulge or herniation.     C3-4: A mild central disc bulge is present. Moderate facet degenerative  disease is present bilaterally.     C4-5: Moderate-to-severe facet degenerative disease is present on the  right. Moderate foraminal stenosis is present on the right secondary to  facet hypertrophy and uncovertebral degenerative disease.     C5-6: There is no evidence of disc bulge or herniation.     C6-7: Beam-hardening artifact from the patient's shoulders limits  evaluation of the cervical spine and spinal canal. A broad-based disc  osteophyte complex is present resulting in flattening of the ventral  surface of the thecal sac.     C7-T1: There is no evidence of disc bulge or herniation.     IMPRESSION: Beam-hardening artifact limits evaluation of the lower  cervical spine. Multilevel degenerative disease involving the cervical  spine is noted as described above. There is no evidence of fracture.  See  above.     CT EXAMINATION OF THE THORACIC SPINE WITHOUT CONTRAST:      There is a spiculated nodule appreciated involving the right upper lobe  posterolaterally measuring approximately 7 mm in size. The lungs are  only partially visualized.     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 Vacuum disc  effect is appreciated from T11 to L1. There is no evidence of a  compression fracture or of compression deformity. Evaluation of the  spinal canal is limited by technique but no obvious disc herniation is  appreciated.     IMPRESSION:  1.  Multilevel degenerative disease involving the thoracic spine is  noted as described above. This includes anterior ankylosis from T4 to  T11. No obvious fracture is identified. Further evaluation could be  performed with MRI examination of the a thoracic spine as indicated.  2.  There is partial visualization of the lungs. However, there is a  spiculated nodule involving the right upper lobe posterolaterally  measuring 7 mm in size. Comparison with prior studies is recommended. If  no prior study is available for comparison, a dedicated CT examination  of the chest is strongly recommended.     CT EXAMINATION OF THE LUMBAR SPINE WITHOUT CONTRAST:      There is a large fusiform infrarenal abdominal aortic aneurysm  appreciated measuring approximately 4.1 cm in the AP dimension and 4.6  cm in THE transverse dimension. Inferiorly there is a more focal  irregularity and protuberance which likely represents a pseudoaneurysm.  This measures approximately 4 mm in the AP dimension, 14 mm in the  craniocaudal dimension and 13 mm in the transverse dimension.     The adrenal glands are prominent bilaterally more so on the left and low  in attenuation consistent with adrenal adenomas.     There is severe loss of disc height at L4-L5. Anterolisthesis of L3 upon  L4 is appreciated estimated to be approximately 4 mm.     L1-L2: A broad-based disc osteophyte complex is present  resulting in  mild flattening of the ventral surface of the thecal sac.     L2-L3: There is moderate-to-severe central canal stenosis and severe  lateral recess narrowing bilaterally secondary to anterolisthesis of L2  upon L3 (estimated to be 4 mm), moderate facet degenerative disease and  a broad-based disc osteophyte complex.     L3-L4: There is severe central canal stenosis and lateral recess  narrowing bilaterally secondary to moderate-to-severe facet degenerative  disease, anterolisthesis of L4 upon L5 and a broad-based disc osteophyte  complex. Disc material extends into the neural foramen contributing to  mild foraminal stenosis bilaterally.     L4-L5: There is moderate canal stenosis, moderate lateral recess  narrowing on the right and severe lateral recess narrowing on the left  secondary to a broad-based disc osteophyte complex which is more  prominent to the left. Moderate-to-severe foraminal stenosis is present  bilaterally secondary to loss of disc height and extension of a disc  osteophyte complex into the neural foramen.     L5-S1: Transitional anatomy is appreciated consisting of sacralization  of L5. There is no evidence of central canal stenosis.     IMPRESSION:  3.  Transitional anatomy is appreciated consisting of sacralization of  L5. Careful correlation prior to any intervention is required given the  presence of transitional anatomy.  4.  Multilevel degenerative disease involving the lumbar spine is noted  as described above including severe canal stenosis at L3-L4,  moderate-to-severe canal stenosis at L2-L3 and moderate canal stenosis  at L4-L5. There is severe lateral recess narrowing to the left at L4-L5,  bilaterally at L3-L4 and bilaterally at L2-L3. See above.  5.  There is a fusiform infrarenal abdominal aortic aneurysm measuring  approximately 4.1 x 4.6 cm in the transverse dimension. Along the  anterior and inferior aspect of the aneurysm is a focal protuberance  which projects  beyond the calcified lumen suggesting a shallow  pseudoaneurysm. It measures approximately 4 mm in the AP dimension, 14  mm in craniocaudal dimension and 13 mm in the transverse dimension.     The above information was called to and discussed with Dr. Thornton.           Radiation dose reduction techniques were utilized, including automated  exposure control and exposure modulation based on body size.        This report was finalized on 10/31/2023 3:40 PM by Dr. Ron Moseley M.D on Workstation: [id number]       CT Lumbar Spine Without Contrast [[id number]] Collected: 10/31/23 1503     Updated: 10/31/23 1543    Narrative:      CT HEAD, CERVICAL/THORACIC/LUMBAR SPINE WITHOUT CONTRAST     HISTORY: Fall, pain in above areas.     COMPARISON: None.     CT HEAD WITHOUT CONTRAST:     The brain and ventricles are symmetrical. There is no evidence of  intracranial hemorrhage, hydrocephalus or of abnormal extra-axial fluid.  Moderate small vessel ischemic disease is noted. A lacunar infarct is  appreciated involving the thalamic nuclei bilaterally each measuring  approximately 5 mm in size, age indeterminate. A lacunar infarct  involving the body of the caudate nucleus on the right is appreciated  measuring approximately 7 mm in diameter. No obvious area of decreased  attenuation to suggest an acute infarction is identified.       Impression:      There is no evidence of fracture or of intracranial  hemorrhage. Atrophy, small vessel ischemic disease and age-indeterminate  lacunar infarcts involving the thalamic nuclei are noted bilaterally.  Further evaluation could be performed with a MRI examination of the  brain as indicated.     CT EXAMINATAION OF THE CERVICAL SPINE WITHOUT CONTRAST:      Moderate-to-severe degenerative disease is noted at C1-C2 anteriorly. A  moderate pannus is noted posterior to the dens. There is fusion of the  facets to the left at C2-3. There is a grade 1 anterolisthesis of C4  upon C5 estimated to be 2  mm. Moderate-to-severe loss of disc height and  moderate endplate degenerative changes are noted at C6-7. There is no  evidence of prevertebral edema or of fracture.     C2-3: There is no evidence of disc bulge or herniation.     C3-4: A mild central disc bulge is present. Moderate facet degenerative  disease is present bilaterally.     C4-5: Moderate-to-severe facet degenerative disease is present on the  right. Moderate foraminal stenosis is present on the right secondary to  facet hypertrophy and uncovertebral degenerative disease.     C5-6: There is no evidence of disc bulge or herniation.     C6-7: Beam-hardening artifact from the patient's shoulders limits  evaluation of the cervical spine and spinal canal. A broad-based disc  osteophyte complex is present resulting in flattening of the ventral  surface of the thecal sac.     C7-T1: There is no evidence of disc bulge or herniation.     IMPRESSION: Beam-hardening artifact limits evaluation of the lower  cervical spine. Multilevel degenerative disease involving the cervical  spine is noted as described above. There is no evidence of fracture. See  above.     CT EXAMINATION OF THE THORACIC SPINE WITHOUT CONTRAST:      There is a spiculated nodule appreciated involving the right upper lobe  posterolaterally measuring approximately 7 mm in size. The lungs are  only partially visualized.     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 Vacuum disc  effect is appreciated from T11 to L1. There is no evidence of a  compression fracture or of compression deformity. Evaluation of the  spinal canal is limited by technique but no obvious disc herniation is  appreciated.     IMPRESSION:  1.  Multilevel degenerative disease involving the thoracic spine is  noted as described above. This includes anterior ankylosis from T4 to  T11. No obvious fracture is identified. Further evaluation could be  performed with MRI examination of the a thoracic spine as  indicated.  2.  There is partial visualization of the lungs. However, there is a  spiculated nodule involving the right upper lobe posterolaterally  measuring 7 mm in size. Comparison with prior studies is recommended. If  no prior study is available for comparison, a dedicated CT examination  of the chest is strongly recommended.     CT EXAMINATION OF THE LUMBAR SPINE WITHOUT CONTRAST:      There is a large fusiform infrarenal abdominal aortic aneurysm  appreciated measuring approximately 4.1 cm in the AP dimension and 4.6  cm in THE transverse dimension. Inferiorly there is a more focal  irregularity and protuberance which likely represents a pseudoaneurysm.  This measures approximately 4 mm in the AP dimension, 14 mm in the  craniocaudal dimension and 13 mm in the transverse dimension.     The adrenal glands are prominent bilaterally more so on the left and low  in attenuation consistent with adrenal adenomas.     There is severe loss of disc height at L4-L5. Anterolisthesis of L3 upon  L4 is appreciated estimated to be approximately 4 mm.     L1-L2: A broad-based disc osteophyte complex is present resulting in  mild flattening of the ventral surface of the thecal sac.     L2-L3: There is moderate-to-severe central canal stenosis and severe  lateral recess narrowing bilaterally secondary to anterolisthesis of L2  upon L3 (estimated to be 4 mm), moderate facet degenerative disease and  a broad-based disc osteophyte complex.     L3-L4: There is severe central canal stenosis and lateral recess  narrowing bilaterally secondary to moderate-to-severe facet degenerative  disease, anterolisthesis of L4 upon L5 and a broad-based disc osteophyte  complex. Disc material extends into the neural foramen contributing to  mild foraminal stenosis bilaterally.     L4-L5: There is moderate canal stenosis, moderate lateral recess  narrowing on the right and severe lateral recess narrowing on the left  secondary to a broad-based  disc osteophyte complex which is more  prominent to the left. Moderate-to-severe foraminal stenosis is present  bilaterally secondary to loss of disc height and extension of a disc  osteophyte complex into the neural foramen.     L5-S1: Transitional anatomy is appreciated consisting of sacralization  of L5. There is no evidence of central canal stenosis.     IMPRESSION:  3.  Transitional anatomy is appreciated consisting of sacralization of  L5. Careful correlation prior to any intervention is required given the  presence of transitional anatomy.  4.  Multilevel degenerative disease involving the lumbar spine is noted  as described above including severe canal stenosis at L3-L4,  moderate-to-severe canal stenosis at L2-L3 and moderate canal stenosis  at L4-L5. There is severe lateral recess narrowing to the left at L4-L5,  bilaterally at L3-L4 and bilaterally at L2-L3. See above.  5.  There is a fusiform infrarenal abdominal aortic aneurysm measuring  approximately 4.1 x 4.6 cm in the transverse dimension. Along the  anterior and inferior aspect of the aneurysm is a focal protuberance  which projects beyond the calcified lumen suggesting a shallow  pseudoaneurysm. It measures approximately 4 mm in the AP dimension, 14  mm in craniocaudal dimension and 13 mm in the transverse dimension.     The above information was called to and discussed with Dr. Thornton.           Radiation dose reduction techniques were utilized, including automated  exposure control and exposure modulation based on body size.        This report was finalized on 10/31/2023 3:40 PM by Dr. Ron Moseley M.D on Workstation: [id number]       XR Hip With or Without Pelvis 2 - 3 View Left [[id number]] Collected: 10/31/23 1333     Updated: 10/31/23 1339    Narrative:      EXAM: XR HIP W OR WO PELVIS 2-3 VIEW LEFT-     INDICATION: Left hip pain status post fall     COMPARISON: None available          Impression:      No fracture. Normal alignment. Hip joint  spaces maintained.  Likely calcified uterine fibroid.           This report was finalized on 10/31/2023 1:35 PM by Dr. Joey Ojeda M.D on Workstation: [id number]       XR Knee 3 View Left [[id number]] Collected: 10/31/23 1331     Updated: 10/31/23 1337    Narrative:      EXAM: XR KNEE 3 VW LEFT-     INDICATION: Left knee pain post fall     COMPARISON: None available          Impression:      No fracture. Normal alignment. Mild medial compartment  narrowing. No joint effusion. Arteriovascular calcifications.           This report was finalized on 10/31/2023 1:33 PM by Dr. Joey Ojeda M.D on Workstation: [id number]                 Assessment:  Active Hospital Problems    Diagnosis  POA    **Multiple falls [R29.6]  Not Applicable      Resolved Hospital Problems   No resolved problems to display.   Hypertension  Hyperglycemia  Back pain  abdominal aortic aneurysm  Pulmonary nodule  obesity    Plan:  Pt/ot to see  Ask vascular to see her regarding the aneurysm  Follow up for the pu;lmonary nodule  May need placement  Trend labs  Dw patient and ed provider  No home med rec available at this time  Hermelinda Martin MD  10/31/2023  22:30 EDT

## 2023-11-01 NOTE — DISCHARGE PLACEMENT REQUEST
"Kirti Santillan (77 y.o. Female)       Date of Birth   1945    Social Security Number       Address   [address] [place] [address]    Home Phone   [phone]    MRN   [id number]       Protestant   Hoah[employer]ism    Marital Status                               Admission Date   10/31/23    Admission Type   Emergency    Admitting Provider   Hermelinda Martin MD    Attending Provider   Jozef Winters MD    Department, Room/Bed   [facility], 126/1       Discharge Date       Discharge Disposition       Discharge Destination                                 Attending Provider: Jozef Winters MD    Allergies: Hydrocodone    Isolation: None   Infection: None   Code Status: CPR    Ht: 160 cm (63\")   Wt: 78.8 kg (173 lb 11.6 oz)    Admission Cmt: None   Principal Problem: Multiple falls [R29.6]                   Active Insurance as of 10/31/2023       Primary Coverage       Payor Plan Insurance Group Employer/Plan Group    MEDICARE MEDICARE A & B        Payor Plan Address Payor Plan Phone Number Payor Plan Fax Number Effective Dates    [address] [phone]  11/1/2009 - None Entered    [place] [address]         Subscriber Name Subscriber Birth Date Member ID       KIRTI SANTILLAN 1945 [id number]                     Emergency Contacts        (Rel.) Home Phone Work Phone Mobile Phone    Kenny Santillan (Spouse) -- -- [phone]    TyrellRobinson (Son) [phone] -- --                "

## 2023-11-01 NOTE — PROGRESS NOTES
"[facility] Clinical Pharmacy Services: Enoxaparin Consult    Kirti Santillan has a pharmacy consult to dose prophylactic enoxaparin per Dr. Winters's request.     Indication: VTE Prophylaxis  Home Anticoagulation: none     Relevant clinical data and objective history reviewed:  77 y.o. female 160 cm (63\") 78.8 kg (173 lb 11.6 oz)   Body mass index is 30.77 kg/m².   Results from last 7 days   Lab Units 11/01/23  0649 10/31/23  1302   CREATININE mg/dL 0.82 0.81   PLATELETS 10*3/mm3 148 160   HEMOGLOBIN g/dL 14.4 15.3     Estimated Creatinine Clearance: 56.2 mL/min (by C-G formula based on SCr of 0.82 mg/dL).    Assessment/Plan    Will start patient on enoxaparin 40mg subcutaneous every 24 hours for prophylaxis in patient with Crcl>30 ml/min. Consult order will be discontinued but pharmacy will continue to follow.     Desi Gross, PharmD  Clinical Pharmacist    "

## 2023-11-01 NOTE — CONSULTS
Name: Kirti Santillan ADMIT: 10/31/2023   : 1945  PCP: Provider, No Known    MRN: [id number] LOS: 1 days   AGE/SEX: 77 y.o. female  ROOM: [facility] 126/1     Inpatient Vascular Surgery Consult  Consult performed by: Sherman Wilson MD  Consult ordered by: Jozef Winters MD        CC: Falls  Subjective     History of Present Illness  77 y.o. female admitted with falls whom we were asked to see for an abdominal aortic aneurysm that was newly discovered when working up her spinal stenosis.      Review of Systems   Constitutional:  Positive for activity change and fatigue. Negative for fever.   Cardiovascular:  Negative for chest pain.   Musculoskeletal:  Positive for gait problem.       History Review Reviewed Comments   Past Medical History:  [x]  Denies diabetes   Past Surgical History: [x]  Eye surgery, no back surgery or prior vascular surgery   Family History: [x]  Denies family history of aneurysms   Social History: [x]  Former smoker but quit many years ago     Scheduled Meds:     senna-docusate sodium, 2 tablet, Oral, BID      IV Meds:        Allergies: Hydrocodone      Objective   Temp:  [97.7 °F (36.5 °C)-98.9 °F (37.2 °C)] 97.7 °F (36.5 °C)  Heart Rate:  [74-89] 74  Resp:  [18-20] 18  BP: (106-167)/(72-94) 160/80    No intake/output data recorded.    Physical Exam  Vitals reviewed.   Constitutional:       Appearance: She is well-developed.   Eyes:      Conjunctiva/sclera: Conjunctivae normal.   Cardiovascular:      Rate and Rhythm: Normal rate.   Pulmonary:      Effort: Pulmonary effort is normal.   Abdominal:      Palpations: Abdomen is soft.      Tenderness: There is no abdominal tenderness.      Comments: No prior abdominal incisions.  Aorta is palpable and nontender.  These changes in the groins.  Moderate pedal edema.  Feet appear to be warm and well-perfused but pulses nonpalpable perhaps secondary to edema.  I do not appreciate a broad popliteal artery pulse.   Neurological:  "     Mental Status: She is alert and oriented to person, place, and time.       Data Reviewed:  CBC    Results from last 7 days   Lab Units 11/01/23  0649 10/31/23  1302   WBC 10*3/mm3 7.06 10.23   HEMOGLOBIN g/dL 14.4 15.3   PLATELETS 10*3/mm3 148 160     BMP   Results from last 7 days   Lab Units 11/01/23  0649 10/31/23  1302   SODIUM mmol/L 137 138   POTASSIUM mmol/L 4.0 4.1   CHLORIDE mmol/L 101 102   CO2 mmol/L 29.0 26.7   BUN mg/dL 17 21   CREATININE mg/dL 0.82 0.81   GLUCOSE mg/dL 112* 145*     HbA1C No results found for: \"HGBA1C\"  Radiology(recent) CT Angiogram Chest    Result Date: 10/31/2023  1. Infrarenal abdominal aortic 4.1 x 4.3 cm aneurysm. No pseudoaneurysm seen.  2. 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 Right upper lobe pulmonary nodule. Consider 6-month follow-up if clinically indicated  4. Additional incidental findings as above.  This report was finalized on 10/31/2023 4:36 PM by Dr. Fito Cat M.D on Workstation: [id number]      CT Angiogram Abdomen Pelvis    Result Date: 10/31/2023  1. Infrarenal abdominal aortic 4.1 x 4.3 cm aneurysm. No pseudoaneurysm seen.  2. 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 Right upper lobe pulmonary nodule. Consider 6-month follow-up if clinically indicated  4. Additional incidental findings as above.  This report was finalized on 10/31/2023 4:36 PM by Dr. Fito Cat M.D on Workstation: [id number]      CT Head Without Contrast    Result Date: 10/31/2023  There is no evidence of fracture or of intracranial hemorrhage. Atrophy, small vessel ischemic disease and age-indeterminate lacunar infarcts involving the thalamic nuclei are noted bilaterally. Further evaluation could be performed with a MRI examination of the brain as indicated.  CT EXAMINATAION OF THE CERVICAL SPINE WITHOUT CONTRAST:  Moderate-to-severe degenerative disease is noted at C1-C2 anteriorly. A moderate pannus is noted posterior to the dens. There is fusion of the " facets to the left at C2-3. There is a grade 1 anterolisthesis of C4 upon C5 estimated to be 2 mm. Moderate-to-severe loss of disc height and moderate endplate degenerative changes are noted at C6-7. There is no evidence of prevertebral edema or of fracture.  C2-3: There is no evidence of disc bulge or herniation.  C3-4: A mild central disc bulge is present. Moderate facet degenerative disease is present bilaterally.  C4-5: Moderate-to-severe facet degenerative disease is present on the right. Moderate foraminal stenosis is present on the right secondary to facet hypertrophy and uncovertebral degenerative disease.  C5-6: There is no evidence of disc bulge or herniation.  C6-7: Beam-hardening artifact from the patient's shoulders limits evaluation of the cervical spine and spinal canal. A broad-based disc osteophyte complex is present resulting in flattening of the ventral surface of the thecal sac.  C7-T1: There is no evidence of disc bulge or herniation.  IMPRESSION: Beam-hardening artifact limits evaluation of the lower cervical spine. Multilevel degenerative disease involving the cervical spine is noted as described above. There is no evidence of fracture. See above.  CT EXAMINATION OF THE THORACIC SPINE WITHOUT CONTRAST:  There is a spiculated nodule appreciated involving the right upper lobe posterolaterally measuring approximately 7 mm in size. The lungs are only partially visualized.  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 Vacuum disc effect is appreciated from T11 to L1. There is no evidence of a compression fracture or of compression deformity. Evaluation of the spinal canal is limited by technique but no obvious disc herniation is appreciated.  IMPRESSION: 1.  Multilevel degenerative disease involving the thoracic spine is noted as described above. This includes anterior ankylosis from T4 to T11. No obvious fracture is identified. Further evaluation could be performed with MRI examination  of the a thoracic spine as indicated. 2.  There is partial visualization of the lungs. However, there is a spiculated nodule involving the right upper lobe posterolaterally measuring 7 mm in size. Comparison with prior studies is recommended. If no prior study is available for comparison, a dedicated CT examination of the chest is strongly recommended.  CT EXAMINATION OF THE LUMBAR SPINE WITHOUT CONTRAST:  There is a large fusiform infrarenal abdominal aortic aneurysm appreciated measuring approximately 4.1 cm in the AP dimension and 4.6 cm in THE transverse dimension. 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 This measures approximately 4 mm in the AP dimension, 14 mm in the craniocaudal dimension and 13 mm in the transverse dimension.  The adrenal glands are prominent bilaterally more so on the left and low in attenuation consistent with adrenal adenomas.  There is severe loss of disc height at L4-L5. Anterolisthesis of L3 upon L4 is appreciated estimated to be approximately 4 mm.  L1-L2: A broad-based disc osteophyte complex is present resulting in mild flattening of the ventral surface of the thecal sac.  L2-L3: There is moderate-to-severe central canal stenosis and severe lateral recess narrowing bilaterally secondary to anterolisthesis of L2 upon L3 (estimated to be 4 mm), moderate facet degenerative disease and a broad-based disc osteophyte complex.  L3-L4: There is severe central canal stenosis and lateral recess narrowing bilaterally secondary to moderate-to-severe facet degenerative disease, anterolisthesis of L4 upon L5 and a broad-based disc osteophyte complex. Disc material extends into the neural foramen contributing to mild foraminal stenosis bilaterally.  L4-L5: There is moderate canal stenosis, moderate lateral recess narrowing on the right and severe lateral recess narrowing on the left secondary to a broad-based disc osteophyte complex  which is more prominent to the left. Moderate-to-severe foraminal stenosis is present bilaterally secondary to loss of disc height and extension of a disc osteophyte complex into the neural foramen.  L5-S1: Transitional anatomy is appreciated consisting of sacralization of L5. There is no evidence of central canal stenosis.  IMPRESSION: 3.  Transitional anatomy is appreciated consisting of sacralization of L5. Careful correlation prior to any intervention is required given the presence of transitional anatomy. 4.  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 There is severe lateral recess narrowing to the left at L4-L5, bilaterally at L3-L4 and bilaterally at L2-L3. See above. 5.  There is a fusiform infrarenal abdominal aortic aneurysm measuring approximately 4.1 x 4.6 cm in the transverse dimension. Along the anterior and inferior aspect of the aneurysm is a focal protuberance which projects beyond the calcified lumen suggesting a shallow pseudoaneurysm. It measures approximately 4 mm in the AP dimension, 14 mm in craniocaudal dimension and 13 mm in the transverse dimension.  The above information was called to and discussed with Dr. Thornton.    Radiation dose reduction techniques were utilized, including automated exposure control and exposure modulation based on body size.   This report was finalized on 10/31/2023 3:40 PM by Dr. Ron Moseley M.D on Workstation: [id number]      CT Cervical Spine Without Contrast    Result Date: 10/31/2023  There is no evidence of fracture or of intracranial hemorrhage. Atrophy, small vessel ischemic disease and age-indeterminate lacunar infarcts involving the thalamic nuclei are noted bilaterally. Further evaluation could be performed with a MRI examination of the brain as indicated.  CT EXAMINATAION OF THE CERVICAL  SPINE WITHOUT CONTRAST:  Moderate-to-severe degenerative disease is noted at C1-C2 anteriorly. A moderate pannus is noted posterior to the dens. There is fusion of the facets to the left at C2-3. There is a grade 1 anterolisthesis of C4 upon C5 estimated to be 2 mm. Moderate-to-severe loss of disc height and moderate endplate degenerative changes are noted at C6-7. There is no evidence of prevertebral edema or of fracture.  C2-3: There is no evidence of disc bulge or herniation.  C3-4: A mild central disc bulge is present. Moderate facet degenerative disease is present bilaterally.  C4-5: Moderate-to-severe facet degenerative disease is present on the right. Moderate foraminal stenosis is present on the right secondary to facet hypertrophy and uncovertebral degenerative disease.  C5-6: There is no evidence of disc bulge or herniation.  C6-7: Beam-hardening artifact from the patient's shoulders limits evaluation of the cervical spine and spinal canal. A broad-based disc osteophyte complex is present resulting in flattening of the ventral surface of the thecal sac.  C7-T1: There is no evidence of disc bulge or herniation.  IMPRESSION: Beam-hardening artifact limits evaluation of the lower cervical spine. Multilevel degenerative disease involving the cervical spine is noted as described above. There is no evidence of fracture. See above.  CT EXAMINATION OF THE THORACIC SPINE WITHOUT CONTRAST:  There is a spiculated nodule appreciated involving the right upper lobe posterolaterally measuring approximately 7 mm in size. The lungs are only partially visualized.  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 Vacuum disc effect is appreciated from T11 to L1. There is no evidence of a compression fracture or of compression deformity. Evaluation of the spinal canal is limited by technique but no obvious disc herniation is appreciated.  IMPRESSION: 1.  Multilevel degenerative disease involving the thoracic spine is  noted as described above. This includes anterior ankylosis from T4 to T11. No obvious fracture is identified. Further evaluation could be performed with MRI examination of the a thoracic spine as indicated. 2.  There is partial visualization of the lungs. However, there is a spiculated nodule involving the right upper lobe posterolaterally measuring 7 mm in size. Comparison with prior studies is recommended. If no prior study is available for comparison, a dedicated CT examination of the chest is strongly recommended.  CT EXAMINATION OF THE LUMBAR SPINE WITHOUT CONTRAST:  There is a large fusiform infrarenal abdominal aortic aneurysm appreciated measuring approximately 4.1 cm in the AP dimension and 4.6 cm in THE transverse dimension. 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 This measures approximately 4 mm in the AP dimension, 14 mm in the craniocaudal dimension and 13 mm in the transverse dimension.  The adrenal glands are prominent bilaterally more so on the left and low in attenuation consistent with adrenal adenomas.  There is severe loss of disc height at L4-L5. Anterolisthesis of L3 upon L4 is appreciated estimated to be approximately 4 mm.  L1-L2: A broad-based disc osteophyte complex is present resulting in mild flattening of the ventral surface of the thecal sac.  L2-L3: There is moderate-to-severe central canal stenosis and severe lateral recess narrowing bilaterally secondary to anterolisthesis of L2 upon L3 (estimated to be 4 mm), moderate facet degenerative disease and a broad-based disc osteophyte complex.  L3-L4: There is severe central canal stenosis and lateral recess narrowing bilaterally secondary to moderate-to-severe facet degenerative disease, anterolisthesis of L4 upon L5 and a broad-based disc osteophyte complex. Disc material extends into the neural foramen contributing to mild foraminal stenosis bilaterally.  L4-L5: There is  moderate canal stenosis, moderate lateral recess narrowing on the right and severe lateral recess narrowing on the left secondary to a broad-based disc osteophyte complex which is more prominent to the left. Moderate-to-severe foraminal stenosis is present bilaterally secondary to loss of disc height and extension of a disc osteophyte complex into the neural foramen.  L5-S1: Transitional anatomy is appreciated consisting of sacralization of L5. There is no evidence of central canal stenosis.  IMPRESSION: 3.  Transitional anatomy is appreciated consisting of sacralization of L5. Careful correlation prior to any intervention is required given the presence of transitional anatomy. 4.  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 There is severe lateral recess narrowing to the left at L4-L5, bilaterally at L3-L4 and bilaterally at L2-L3. See above. 5.  There is a fusiform infrarenal abdominal aortic aneurysm measuring approximately 4.1 x 4.6 cm in the transverse dimension. Along the anterior and inferior aspect of the aneurysm is a focal protuberance which projects beyond the calcified lumen suggesting a shallow pseudoaneurysm. It measures approximately 4 mm in the AP dimension, 14 mm in craniocaudal dimension and 13 mm in the transverse dimension.  The above information was called to and discussed with Dr. Thornton.    Radiation dose reduction techniques were utilized, including automated exposure control and exposure modulation based on body size.   This report was finalized on 10/31/2023 3:40 PM by Dr. Ron Moseley M.D on Workstation: [id number]      CT Thoracic Spine Without Contrast    Result Date: 10/31/2023  There is no evidence of fracture or of intracranial hemorrhage. Atrophy, small vessel ischemic disease and age-indeterminate lacunar infarcts  involving the thalamic nuclei are noted bilaterally. Further evaluation could be performed with a MRI examination of the brain as indicated.  CT EXAMINATAION OF THE CERVICAL SPINE WITHOUT CONTRAST:  Moderate-to-severe degenerative disease is noted at C1-C2 anteriorly. A moderate pannus is noted posterior to the dens. There is fusion of the facets to the left at C2-3. There is a grade 1 anterolisthesis of C4 upon C5 estimated to be 2 mm. Moderate-to-severe loss of disc height and moderate endplate degenerative changes are noted at C6-7. There is no evidence of prevertebral edema or of fracture.  C2-3: There is no evidence of disc bulge or herniation.  C3-4: A mild central disc bulge is present. Moderate facet degenerative disease is present bilaterally.  C4-5: Moderate-to-severe facet degenerative disease is present on the right. Moderate foraminal stenosis is present on the right secondary to facet hypertrophy and uncovertebral degenerative disease.  C5-6: There is no evidence of disc bulge or herniation.  C6-7: Beam-hardening artifact from the patient's shoulders limits evaluation of the cervical spine and spinal canal. A broad-based disc osteophyte complex is present resulting in flattening of the ventral surface of the thecal sac.  C7-T1: There is no evidence of disc bulge or herniation.  IMPRESSION: Beam-hardening artifact limits evaluation of the lower cervical spine. Multilevel degenerative disease involving the cervical spine is noted as described above. There is no evidence of fracture. See above.  CT EXAMINATION OF THE THORACIC SPINE WITHOUT CONTRAST:  There is a spiculated nodule appreciated involving the right upper lobe posterolaterally measuring approximately 7 mm in size. The lungs are only partially visualized.  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 Vacuum disc effect is appreciated from T11 to L1. There is no evidence of a compression fracture or of compression deformity.  Evaluation of the spinal canal is limited by technique but no obvious disc herniation is appreciated.  IMPRESSION: 1.  Multilevel degenerative disease involving the thoracic spine is noted as described above. This includes anterior ankylosis from T4 to T11. No obvious fracture is identified. Further evaluation could be performed with MRI examination of the a thoracic spine as indicated. 2.  There is partial visualization of the lungs. However, there is a spiculated nodule involving the right upper lobe posterolaterally measuring 7 mm in size. Comparison with prior studies is recommended. If no prior study is available for comparison, a dedicated CT examination of the chest is strongly recommended.  CT EXAMINATION OF THE LUMBAR SPINE WITHOUT CONTRAST:  There is a large fusiform infrarenal abdominal aortic aneurysm appreciated measuring approximately 4.1 cm in the AP dimension and 4.6 cm in THE transverse dimension. 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 This measures approximately 4 mm in the AP dimension, 14 mm in the craniocaudal dimension and 13 mm in the transverse dimension.  The adrenal glands are prominent bilaterally more so on the left and low in attenuation consistent with adrenal adenomas.  There is severe loss of disc height at L4-L5. Anterolisthesis of L3 upon L4 is appreciated estimated to be approximately 4 mm.  L1-L2: A broad-based disc osteophyte complex is present resulting in mild flattening of the ventral surface of the thecal sac.  L2-L3: There is moderate-to-severe central canal stenosis and severe lateral recess narrowing bilaterally secondary to anterolisthesis of L2 upon L3 (estimated to be 4 mm), moderate facet degenerative disease and a broad-based disc osteophyte complex.  L3-L4: There is severe central canal stenosis and lateral recess narrowing bilaterally secondary to moderate-to-severe facet degenerative disease,  anterolisthesis of L4 upon L5 and a broad-based disc osteophyte complex. Disc material extends into the neural foramen contributing to mild foraminal stenosis bilaterally.  L4-L5: There is moderate canal stenosis, moderate lateral recess narrowing on the right and severe lateral recess narrowing on the left secondary to a broad-based disc osteophyte complex which is more prominent to the left. Moderate-to-severe foraminal stenosis is present bilaterally secondary to loss of disc height and extension of a disc osteophyte complex into the neural foramen.  L5-S1: Transitional anatomy is appreciated consisting of sacralization of L5. There is no evidence of central canal stenosis.  IMPRESSION: 3.  Transitional anatomy is appreciated consisting of sacralization of L5. Careful correlation prior to any intervention is required given the presence of transitional anatomy. 4.  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 There is severe lateral recess narrowing to the left at L4-L5, bilaterally at L3-L4 and bilaterally at L2-L3. See above. 5.  There is a fusiform infrarenal abdominal aortic aneurysm measuring approximately 4.1 x 4.6 cm in the transverse dimension. Along the anterior and inferior aspect of the aneurysm is a focal protuberance which projects beyond the calcified lumen suggesting a shallow pseudoaneurysm. It measures approximately 4 mm in the AP dimension, 14 mm in craniocaudal dimension and 13 mm in the transverse dimension.  The above information was called to and discussed with Dr. Thornton.    Radiation dose reduction techniques were utilized, including automated exposure control and exposure modulation based on body size.   This report was finalized on 10/31/2023 3:40 PM by Dr. Ron Moseley M.D on Workstation: [id number]      CT Lumbar Spine  Without Contrast    Result Date: 10/31/2023  There is no evidence of fracture or of intracranial hemorrhage. Atrophy, small vessel ischemic disease and age-indeterminate lacunar infarcts involving the thalamic nuclei are noted bilaterally. Further evaluation could be performed with a MRI examination of the brain as indicated.  CT EXAMINATAION OF THE CERVICAL SPINE WITHOUT CONTRAST:  Moderate-to-severe degenerative disease is noted at C1-C2 anteriorly. A moderate pannus is noted posterior to the dens. There is fusion of the facets to the left at C2-3. There is a grade 1 anterolisthesis of C4 upon C5 estimated to be 2 mm. Moderate-to-severe loss of disc height and moderate endplate degenerative changes are noted at C6-7. There is no evidence of prevertebral edema or of fracture.  C2-3: There is no evidence of disc bulge or herniation.  C3-4: A mild central disc bulge is present. Moderate facet degenerative disease is present bilaterally.  C4-5: Moderate-to-severe facet degenerative disease is present on the right. Moderate foraminal stenosis is present on the right secondary to facet hypertrophy and uncovertebral degenerative disease.  C5-6: There is no evidence of disc bulge or herniation.  C6-7: Beam-hardening artifact from the patient's shoulders limits evaluation of the cervical spine and spinal canal. A broad-based disc osteophyte complex is present resulting in flattening of the ventral surface of the thecal sac.  C7-T1: There is no evidence of disc bulge or herniation.  IMPRESSION: Beam-hardening artifact limits evaluation of the lower cervical spine. Multilevel degenerative disease involving the cervical spine is noted as described above. There is no evidence of fracture. See above.  CT EXAMINATION OF THE THORACIC SPINE WITHOUT CONTRAST:  There is a spiculated nodule appreciated involving the right upper lobe posterolaterally measuring approximately 7 mm in size. The lungs are only partially visualized.  There  is ankylosis of the thoracic spine from T4 to T11. Vacuum disc effect is appreciated from T11 to L1. There is no evidence of a compression fracture or of compression deformity. Evaluation of the spinal canal is limited by technique but no obvious disc herniation is appreciated.  IMPRESSION: 1.  Multilevel degenerative disease involving the thoracic spine is noted as described above. This includes anterior ankylosis from T4 to T11. No obvious fracture is identified. Further evaluation could be performed with MRI examination of the a thoracic spine as indicated. 2.  There is partial visualization of the lungs. However, there is a spiculated nodule involving the right upper lobe posterolaterally measuring 7 mm in size. Comparison with prior studies is recommended. If no prior study is available for comparison, a dedicated CT examination of the chest is strongly recommended.  CT EXAMINATION OF THE LUMBAR SPINE WITHOUT CONTRAST:  There is a large fusiform infrarenal abdominal aortic aneurysm appreciated measuring approximately 4.1 cm in the AP dimension and 4.6 cm in THE transverse dimension. 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 This measures approximately 4 mm in the AP dimension, 14 mm in the craniocaudal dimension and 13 mm in the transverse dimension.  The adrenal glands are prominent bilaterally more so on the left and low in attenuation consistent with adrenal adenomas.  There is severe loss of disc height at L4-L5. Anterolisthesis of L3 upon L4 is appreciated estimated to be approximately 4 mm.  L1-L2: A broad-based disc osteophyte complex is present resulting in mild flattening of the ventral surface of the thecal sac.  L2-L3: There is moderate-to-severe central canal stenosis and severe lateral recess narrowing bilaterally secondary to anterolisthesis of L2 upon L3 (estimated to be 4 mm), moderate facet degenerative disease and a broad-based  disc osteophyte complex.  L3-L4: There is severe central canal stenosis and lateral recess narrowing bilaterally secondary to moderate-to-severe facet degenerative disease, anterolisthesis of L4 upon L5 and a broad-based disc osteophyte complex. Disc material extends into the neural foramen contributing to mild foraminal stenosis bilaterally.  L4-L5: There is moderate canal stenosis, moderate lateral recess narrowing on the right and severe lateral recess narrowing on the left secondary to a broad-based disc osteophyte complex which is more prominent to the left. Moderate-to-severe foraminal stenosis is present bilaterally secondary to loss of disc height and extension of a disc osteophyte complex into the neural foramen.  L5-S1: Transitional anatomy is appreciated consisting of sacralization of L5. There is no evidence of central canal stenosis.  IMPRESSION: 3.  Transitional anatomy is appreciated consisting of sacralization of L5. Careful correlation prior to any intervention is required given the presence of transitional anatomy. 4.  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 There is severe lateral recess narrowing to the left at L4-L5, bilaterally at L3-L4 and bilaterally at L2-L3. See above. 5.  There is a fusiform infrarenal abdominal aortic aneurysm measuring approximately 4.1 x 4.6 cm in the transverse dimension. Along the anterior and inferior aspect of the aneurysm is a focal protuberance which projects beyond the calcified lumen suggesting a shallow pseudoaneurysm. It measures approximately 4 mm in the AP dimension, 14 mm in craniocaudal dimension and 13 mm in the transverse dimension.  The above information was called to and discussed with Dr. Thornton.    Radiation dose reduction techniques were utilized, including automated exposure control  and exposure modulation based on body size.   This report was finalized on 10/31/2023 3:40 PM by Dr. Ron Moseley M.D on Workstation: [id number]      XR Hip With or Without Pelvis 2 - 3 View Left    Result Date: 10/31/2023  No fracture. Normal alignment. Hip joint spaces maintained. Likely calcified uterine fibroid.    This report was finalized on 10/31/2023 1:35 PM by Dr. Joey Ojeda M.D on Workstation: [id number]      XR Knee 3 View Left    Result Date: 10/31/2023  No fracture. Normal alignment. Mild medial compartment narrowing. No joint effusion. Arteriovascular calcifications.    This report was finalized on 10/31/2023 1:33 PM by Dr. Joey Ojeda M.D on Workstation: [id number]       Labs significant for: Hemoglobin normal    Imaging Studies:  4.3 cm infrarenal abdominal aortic aneurysm      Active Hospital Problems    Diagnosis  POA    **Multiple falls [R29.6]  Not Applicable      Resolved Hospital Problems   No resolved problems to display.       Data Points:  During this visit the following were done:  Labs Reviewed [x]    Labs Ordered []    Radiology Reports Reviewed [x]    Radiology Ordered []    EKG, echo, and/or stress test reviewed []    Vascular lab results reviewed  []    Vascular lab images reviewed and interpreted per myself   []    Referring Provider Records Reviewed []    ER Records Reviewed []    Hospital Records Reviewed/Summarized [x]    History Obtained From Family []    Radiological images view and Interpreted per myself []    Case Discussed with referring provider []     Decision to obtain and request outside records  []    Total data points:4 or more    Active Hospital Problems:   Active Hospital Problems    Diagnosis  POA    **Multiple falls [R29.6]  Not Applicable      Resolved Hospital Problems   No resolved problems to display.      Assessment & Plan   [place]  Assessment / Plan     77 y.o. female who was admitted to the hospital with weakness and falls.  She had imaging that  led to the incidental diagnosis of an infrarenal abdominal aortic aneurysm and then had a CT angiogram of the abdomen pelvis.  The patient was unaware of the diagnosis of a AAA.  She is a former smoker but quit in the past.  There is no family history of aneurysmal disease.  No prior history of vascular surgery.  Her weakness of her lower extremities, shuffling gait, and falls of been going on for months.  She denies any flank or abdominal pain.    CT angiogram images reviewed.  Patient has an infrarenal 4.3 cm abdominal aortic aneurysm.  No iliac artery aneurysms.  No femoral artery aneurysms.  On exam, does not appear to have a popliteal artery aneurysm.  Modest pedal edema with perfused feet.    I do not believe her current problem her symptomatology is related to her aneurysm and is much more likely to be related to the multifocal spinal and disc disease.  Would not typically recommend consideration for repair until her aneurysm is equal to or greater than 5 cm in size.  Recommend follow-up in my office in 6 weeks with an abdominal aortic ultrasound.  Risk of rupture at this size likely estimated to be less than 1%.  Discussed with patient and family in the room.  Will arrange for follow-up in my office and see again upon request this admission.    I discussed the patients findings and my recommendations with patient and family.  Please call my office with any question: [phone]

## 2023-11-01 NOTE — THERAPY EVALUATION
Patient Name: Kirti Santillan  : 1945    MRN: [id number]                              Today's Date: 2023       Admit Date: 10/31/2023    Visit Dx:     ICD-10-CM ICD-9-CM   1. Bilateral leg weakness  R29.898 729.89   2. Lung nodule  R91.1 793.11   3. Abdominal aortic aneurysm (AAA) without rupture, unspecified part  I71.40 441.4   4. Multiple falls  R29.6 V15.88     Patient Active Problem List   Diagnosis    Multiple falls     Past Medical History:   Diagnosis Date    Sciatica of left side      Past Surgical History:   Procedure Laterality Date    EYE SURGERY        General Information       Row Name 23 1025          OT Time and Intention    Document Type evaluation  -KN     Mode of Treatment individual therapy;occupational therapy  -       Row Name 23 1025          General Information    Patient Profile Reviewed yes  -KN     Prior Level of Function independent:;all household mobility;ADL's  -KN     Existing Precautions/Restrictions fall  multiple falls over the past few days  -KN     Barriers to Rehab medically complex  -       Row Name 23 1025          Living Environment    People in Home spouse  -       Row Name 23 1025          Cognition    Orientation Status (Cognition) oriented x 3  -       Row Name 23 1025          Safety Issues, Functional Mobility    Impairments Affecting Function (Mobility) balance  -KN               User Key  (r) = Recorded By, (t) = Taken By, (c) = Cosigned By      Initials Name Provider Type    Valentin Mendez OT Occupational Therapist                     Mobility/ADL's       Row Name 23 1029          Bed Mobility    Bed Mobility bed mobility (all) activities  -KN     All Activities, [place] (Bed Mobility) minimum assist (75% patient effort);moderate assist (50% patient effort);1 person assist  -       Row Name 23 1029          Transfers    Transfers sit-stand transfer  -       Row Name 23 1029           Sit-Stand Transfer    Sit-Stand [place] (Transfers) minimum assist (75% patient effort);1 person assist  -     Assistive Device (Sit-Stand Transfers) walker, front-wheeled  -       Row Name 11/01/23 1029          Activities of Daily Living    BADL Assessment/Intervention lower body dressing  -       Row Name 11/01/23 1029          Lower Body Dressing Assessment/Training    [place] Level (Lower Body Dressing) lower body dressing skills;doff;don;moderate assist (50% patient effort);socks;shoes/slippers  -     Position (Lower Body Dressing) edge of bed sitting  -               User Key  (r) = Recorded By, (t) = Taken By, (c) = Cosigned By      Initials Name Provider Type    Valentin Mendez OT Occupational Therapist                   Obj/Interventions       Row Name 11/01/23 1032          Sensory Assessment (Somatosensory)    Sensory Assessment (Somatosensory) UE sensation intact  -       Row Name 11/01/23 1032          Vision Assessment/Intervention    Visual Impairment/Limitations WFL  -       Row Name 11/01/23 1032          Range of Motion Comprehensive    General Range of Motion no range of motion deficits identified  -[facility] Name 11/01/23 1032          Strength Comprehensive (MMT)    General Manual Muscle Testing (MMT) Assessment no strength deficits identified  -     Comment, General Manual Muscle Testing (MMT) Assessment 4/5 B UE strength; Good  strength; 4/5 tricep strength  -               User Key  (r) = Recorded By, (t) = Taken By, (c) = Cosigned By      Initials Name Provider Type    Valentin Mendez OT Occupational Therapist                   Goals/Plan       Row Name 11/01/23 1047          Bed Mobility Goal 1 (OT)    Activity/Assistive Device (Bed Mobility Goal 1, OT) bed mobility activities, all  -     [place] Level/Cues Needed (Bed Mobility Goal 1, OT) modified independence  -     Time Frame (Bed Mobility Goal 1, OT) short term goal (STG);2 weeks  -        Row Name 11/01/23 1047          Transfer Goal 1 (OT)    Activity/Assistive Device (Transfer Goal 1, OT) transfers, all  -KN     [place] Level/Cues Needed (Transfer Goal 1, OT) modified independence  -KN     Time Frame (Transfer Goal 1, OT) short term goal (STG);2 weeks  -       Row Name 11/01/23 1047          Bathing Goal 1 (OT)    Activity/Device (Bathing Goal 1, OT) bathing skills, all  -KN     [place] Level/Cues Needed (Bathing Goal 1, OT) modified independence  -KN     Time Frame (Bathing Goal 1, OT) short term goal (STG);2 weeks  -\[facility]\"" Name 11/01/23 1047          Dressing Goal 1 (OT)    Activity/Device (Dressing Goal 1, OT) dressing skills, all  -KN     [place]/Cues Needed (Dressing Goal 1, OT) modified independence  -KN     Time Frame (Dressing Goal 1, OT) short term goal (STG);2 weeks  -\[facility]\"" Name 11/01/23 1047          Therapy Assessment/Plan (OT)    Planned Therapy Interventions (OT) activity tolerance training;adaptive equipment training;BADL retraining;functional balance retraining;occupation/activity based interventions;patient/caregiver education/training;transfer/mobility retraining;strengthening exercise  -               User Key  (r) = Recorded By, (t) = Taken By, (c) = Cosigned By      Initials Name Provider Type    Valentin Mendez, OT Occupational Therapist                   Clinical Impression       Row Name 11/01/23 1033          Pain Assessment    Pretreatment Pain Rating 0/10 - no pain  -     Posttreatment Pain Rating 0/10 - no pain  -       Row Name 11/01/23 1033          Plan of Care Review    Plan of Care Reviewed With patient;spouse  -     Outcome Evaluation Pt is a 77 year old female admitted to [facility] on 10/31 for having multiple falls the past few days. Pt spouse at bed side during evaluation. Pt reports that she is ind. at home with ADLs and uses a rollator for mobility. Pt reports that she doesnt know how she falls all she knows is when she is going  down. Today pt able to perform bed mobility with min-mod A x 1, LB dressing with Mod A sitting EOB, mobility to sink with CGA with verbal cues. Pt not wanting to attempt any grooming task at this time due to soiling brief. Pt returned to bed and nursing notified. Pt will benefit from skilled OT services to improve ind. with ADLs.  -       Row Name 11/01/23 1033          Therapy Assessment/Plan (OT)    Rehab Potential (OT) good, to achieve stated therapy goals  -     Criteria for Skilled Therapeutic Interventions Met (OT) yes;meets criteria;skilled treatment is necessary  -     Therapy Frequency (OT) 5 times/wk  -       Row Name 11/01/23 1033          Therapy Plan Review/Discharge Plan (OT)    Anticipated Discharge Disposition (OT) skilled nursing facility  -       Row Name 11/01/23 1033          Positioning and Restraints    Pre-Treatment Position in bed  -     Post Treatment Position bed  -KN     In Bed notified nsg;supine;call light within reach;encouraged to call for assist;exit alarm on  -               User Key  (r) = Recorded By, (t) = Taken By, (c) = Cosigned By      Initials Name Provider Type    Valentin Mendez, OT Occupational Therapist                   Outcome Measures       Row Name 11/01/23 1048          How much help from another is currently needed...    Putting on and taking off regular lower body clothing? 2  -KN     Bathing (including washing, rinsing, and drying) 2  -KN     Toileting (which includes using toilet bed pan or urinal) 2  -KN     Putting on and taking off regular upper body clothing 4  -KN     Taking care of personal grooming (such as brushing teeth) 4  -KN     Eating meals 4  -KN     AM-PAC 6 Clicks Score (OT) 18  -KN       Row Name 11/01/23 1044 11/01/23 0016       How much help from another person do you currently need...    Turning from your back to your side while in flat bed without using bedrails? 3  -PC 3  -CW    Moving from lying on back to sitting on the side  of a flat bed without bedrails? 3  -PC 3  -CW    Moving to and from a bed to a chair (including a wheelchair)? 3  -PC 3  -CW    Standing up from a chair using your arms (e.g., wheelchair, bedside chair)? 2  -PC 2  -CW    Climbing 3-5 steps with a railing? 2  -PC 2  -CW    To walk in hospital room? 2  -PC 2  -CW    AM-PAC 6 Clicks Score (PT) 15  -PC 15  -CW    Highest level of mobility 4 --> Transferred to chair/commode  -PC 4 --> Transferred to chair/commode  -CW      Row Name 11/01/23 1048          Functional Assessment    Outcome Measure Options AM-PAC 6 Clicks Daily Activity (OT)  -BLADE               User Key  (r) = Recorded By, (t) = Taken By, (c) = Cosigned By      Initials Name Provider Type    PC Hortensia Purcell, PT Physical Therapist    Fe Gomez, RN Registered Nurse    Valentin Mendez OT Occupational Therapist                    Occupational Therapy Education       Title: PT OT SLP Therapies (Done)       Topic: Occupational Therapy (Done)       Point: ADL training (Done)       Description:   Instruct learner(s) on proper safety adaptation and remediation techniques during self care or transfers.   Instruct in proper use of assistive devices.                  Learning Progress Summary             Patient Acceptance, E, VU by BLADE at 11/1/2023 1048                                         User Key       Initials Effective Dates Name Provider Type Discipline    BLADE 08/02/22 -  Valentin Ga OT Occupational Therapist OT                  OT Recommendation and Plan  Planned Therapy Interventions (OT): activity tolerance training, adaptive equipment training, BADL retraining, functional balance retraining, occupation/activity based interventions, patient/caregiver education/training, transfer/mobility retraining, strengthening exercise  Therapy Frequency (OT): 5 times/wk  Plan of Care Review  Plan of Care Reviewed With: patient, spouse  Outcome Evaluation: Pt is a 77 year old female admitted to [facility] on 10/31  for having multiple falls the past few days. Pt spouse at bed side during evaluation. Pt reports that she is ind. at home with ADLs and uses a rollator for mobility. Pt reports that she doesnt know how she falls all she knows is when she is going down. Today pt able to perform bed mobility with min-mod A x 1, LB dressing with Mod A sitting EOB, mobility to sink with CGA with verbal cues. Pt not wanting to attempt any grooming task at this time due to soiling brief. Pt returned to bed and nursing notified. Pt will benefit from skilled OT services to improve ind. with ADLs.     Time Calculation:   Evaluation Complexity (OT)  Review Occupational Profile/Medical/Therapy History Complexity: expanded/moderate complexity  Assessment, Occupational Performance/Identification of Deficit Complexity: 3-5 performance deficits  Clinical Decision Making Complexity (OT): detailed assessment/moderate complexity  Overall Complexity of Evaluation (OT): moderate complexity     Time Calculation- OT       Row Name 11/01/23 1049             Time Calculation- OT    OT Start Time 0804  -KN      OT Stop Time 0829  -KN      OT Time Calculation (min) 25 min  -KN      OT Received On 11/01/23  -KN         Timed Charges    50834 - OT Therapeutic Activity Minutes 10  -KN      10232 - OT Self Care/Mgmt Minutes 5  -KN         Untimed Charges    OT Eval/Re-eval Minutes 10  -KN         Total Minutes    Timed Charges Total Minutes 15  -KN      Untimed Charges Total Minutes 10  -KN       Total Minutes 25  -KN                User Key  (r) = Recorded By, (t) = Taken By, (c) = Cosigned By      Initials Name Provider Type    KN Valentin Ga OT Occupational Therapist                  Therapy Charges for Today       Code Description Service Date Service Provider Modifiers Qty    56915529062 HC OT THERAPEUTIC ACT EA 15 MIN 11/1/2023 Valentin Ga OT GO 1    09330457600 HC OT EVAL MOD COMPLEXITY 2 11/1/2023 Valentin Ga OT GO 1                 Valentin Ga  OT  11/1/2023

## 2023-11-01 NOTE — PLAN OF CARE
Goal Outcome Evaluation:  Plan of Care Reviewed With: patient, spouse           Outcome Evaluation: Pt is a 77 year old female admitted to [facility] on 10/31 for having multiple falls the past few days. Pt spouse at bed side during evaluation. Pt reports that she is ind. at home with ADLs and uses a rollator for mobility. Pt reports that she doesnt know how she falls all she knows is when she is going down. Today pt able to perform bed mobility with min-mod A x 1, LB dressing with Mod A sitting EOB, mobility to sink with CGA with verbal cues. Pt not wanting to attempt any grooming task at this time due to soiling brief. Pt returned to bed and nursing notified. Pt will benefit from skilled OT services to improve ind. with ADLs.      Anticipated Discharge Disposition (OT): skilled nursing facility

## 2023-11-01 NOTE — CASE MANAGEMENT/SOCIAL WORK
Discharge Planning Assessment  [facility]     Patient Name: Kirti Santillan  MRN: [id number]  Today's Date: 11/1/2023    Admit Date: 10/31/2023    Plan: Pt wants to return home at discharge, she is agreeable to home health however she does not have a PCP   Discharge Needs Assessment       Row Name 11/01/23 1304       Living Environment    People in Home spouse;child(marco a), adult    Name(s) of People in Home : Kenny, son: Robinson    Current Living Arrangements home    Potentially Unsafe Housing Conditions none    Primary Care Provided by self    Provides Primary Care For no one    Family Caregiver if Needed child(marco a), adult;spouse    Family Caregiver Names : Kenny, son: Robinson    Quality of Family Relationships supportive    Able to Return to Prior Arrangements yes       Resource/Environmental Concerns    Resource/Environmental Concerns none    Transportation Concerns none       Transition Planning    Patient/Family Anticipates Transition to home with family    Patient/Family Anticipated Services at Transition other (see comments)  pt agreeable to home health but does not have a PCP    Transportation Anticipated family or friend will provide       Discharge Needs Assessment    Readmission Within the Last 30 Days no previous admission in last 30 days    Equipment Currently Used at Home cane, straight;walker, rolling    Concerns to be Addressed adjustment to diagnosis/illness;basic needs    Anticipated Changes Related to Illness none    Equipment Needed After Discharge none                   Discharge Plan       Row Name 11/01/23 1259       Plan    Plan Pt wants to return home at discharge, she is agreeable to home health however she does not have a PCP      Row Name 11/01/23 1245       Plan    Patient/Family in Agreement with Plan yes    Provided Post Acute Provider List? Yes    Post Acute Provider List Home Health;Nursing Home    Delivered To Patient    Method of Delivery In person                   Continued Care and Services - Admitted Since 10/31/2023    Coordination has not been started for this encounter.          Demographic Summary       Row Name 11/01/23 1302       General Information    Admission Type observation    Arrived From home    Required Notices Provided Observation Status Notice    Referral Source admission list    Reason for Consult discharge planning    Preferred Language English       Contact Information    Permission Granted to Share Info With family/designee                   Functional Status       Row Name 11/01/23 1304       Functional Status, IADL    Medications independent    Meal Preparation assistive person    Housekeeping assistive person    Laundry assistive person    Shopping independent    IADL Comments Pt said she can drive. Pt said her  does most of the cooking, cleaning and laundry                  Eliz Rodriguez RN

## 2023-11-02 ENCOUNTER — APPOINTMENT (OUTPATIENT)
Dept: PAIN MEDICINE | Facility: HOSPITAL | Age: 78
End: 2023-11-02
Payer: MEDICARE

## 2023-11-02 ENCOUNTER — ANESTHESIA EVENT (OUTPATIENT)
Dept: PAIN MEDICINE | Facility: HOSPITAL | Age: 78
End: 2023-11-02
Payer: MEDICARE

## 2023-11-02 ENCOUNTER — ANESTHESIA (OUTPATIENT)
Dept: PAIN MEDICINE | Facility: HOSPITAL | Age: 78
End: 2023-11-02
Payer: MEDICARE

## 2023-11-02 ENCOUNTER — APPOINTMENT (OUTPATIENT)
Dept: GENERAL RADIOLOGY | Facility: HOSPITAL | Age: 78
End: 2023-11-02
Payer: MEDICARE

## 2023-11-02 PROBLEM — M54.16 LUMBAR RADICULOPATHY: Status: ACTIVE | Noted: 2023-11-02

## 2023-11-02 PROBLEM — M48.061 LUMBAR SPINAL STENOSIS: Status: ACTIVE | Noted: 2023-11-02

## 2023-11-02 LAB
HBA1C MFR BLD: 6.3 % (ref 4.8–5.6)
VIT B12 BLD-MCNC: 449 PG/ML (ref 211–946)

## 2023-11-02 PROCEDURE — 25510000001 IOPAMIDOL 41 % SOLUTION: Performed by: ANESTHESIOLOGY

## 2023-11-02 PROCEDURE — 3E0R3BZ INTRODUCTION OF ANESTHETIC AGENT INTO SPINAL CANAL, PERCUTANEOUS APPROACH: ICD-10-PCS | Performed by: ANESTHESIOLOGY

## 2023-11-02 PROCEDURE — 3E0R33Z INTRODUCTION OF ANTI-INFLAMMATORY INTO SPINAL CANAL, PERCUTANEOUS APPROACH: ICD-10-PCS | Performed by: ANESTHESIOLOGY

## 2023-11-02 PROCEDURE — 25010000002 DEXAMETHASONE SODIUM PHOSPHATE 10 MG/ML SOLUTION: Performed by: ANESTHESIOLOGY

## 2023-11-02 PROCEDURE — 25010000002 DEXAMETHASONE PER 1 MG: Performed by: ANESTHESIOLOGY

## 2023-11-02 PROCEDURE — 99214 OFFICE O/P EST MOD 30 MIN: CPT | Performed by: NURSE PRACTITIONER

## 2023-11-02 PROCEDURE — 82607 VITAMIN B-12: CPT | Performed by: STUDENT IN AN ORGANIZED HEALTH CARE EDUCATION/TRAINING PROGRAM

## 2023-11-02 PROCEDURE — 77003 FLUOROGUIDE FOR SPINE INJECT: CPT

## 2023-11-02 PROCEDURE — 83036 HEMOGLOBIN GLYCOSYLATED A1C: CPT | Performed by: STUDENT IN AN ORGANIZED HEALTH CARE EDUCATION/TRAINING PROGRAM

## 2023-11-02 RX ORDER — DEXAMETHASONE SODIUM PHOSPHATE 4 MG/ML
INJECTION, SOLUTION INTRA-ARTICULAR; INTRALESIONAL; INTRAMUSCULAR; INTRAVENOUS; SOFT TISSUE
Status: COMPLETED | OUTPATIENT
Start: 2023-11-02 | End: 2023-11-02

## 2023-11-02 RX ORDER — MIDAZOLAM HYDROCHLORIDE 1 MG/ML
1 INJECTION INTRAMUSCULAR; INTRAVENOUS ONCE AS NEEDED
Status: DISCONTINUED | OUTPATIENT
Start: 2023-11-02 | End: 2023-11-07 | Stop reason: HOSPADM

## 2023-11-02 RX ORDER — IOPAMIDOL 408 MG/ML
10 INJECTION, SOLUTION INTRATHECAL
Status: COMPLETED | OUTPATIENT
Start: 2023-11-02 | End: 2023-11-02

## 2023-11-02 RX ORDER — AMLODIPINE BESYLATE 5 MG/1
5 TABLET ORAL
Status: DISCONTINUED | OUTPATIENT
Start: 2023-11-02 | End: 2023-11-07 | Stop reason: HOSPADM

## 2023-11-02 RX ORDER — DEXAMETHASONE SODIUM PHOSPHATE 10 MG/ML
10 INJECTION, SOLUTION INTRAMUSCULAR; INTRAVENOUS ONCE
Status: COMPLETED | OUTPATIENT
Start: 2023-11-02 | End: 2023-11-02

## 2023-11-02 RX ORDER — LIDOCAINE HYDROCHLORIDE 10 MG/ML
1 INJECTION, SOLUTION INFILTRATION; PERINEURAL ONCE
Status: DISCONTINUED | OUTPATIENT
Start: 2023-11-02 | End: 2023-11-07 | Stop reason: HOSPADM

## 2023-11-02 RX ORDER — FENTANYL CITRATE 50 UG/ML
50 INJECTION, SOLUTION INTRAMUSCULAR; INTRAVENOUS ONCE
Status: DISCONTINUED | OUTPATIENT
Start: 2023-11-02 | End: 2023-11-07 | Stop reason: HOSPADM

## 2023-11-02 RX ADMIN — PANTOPRAZOLE SODIUM 40 MG: 40 TABLET, DELAYED RELEASE ORAL at 06:00

## 2023-11-02 RX ADMIN — DEXAMETHASONE SODIUM PHOSPHATE 10 MG: 10 INJECTION, SOLUTION INTRAMUSCULAR; INTRAVENOUS at 11:14

## 2023-11-02 RX ADMIN — OXYCODONE HYDROCHLORIDE AND ACETAMINOPHEN 1 TABLET: 5; 325 TABLET ORAL at 06:00

## 2023-11-02 RX ADMIN — DEXAMETHASONE SODIUM PHOSPHATE 5 MG: 4 INJECTION, SOLUTION INTRA-ARTICULAR; INTRALESIONAL; INTRAMUSCULAR; INTRAVENOUS; SOFT TISSUE at 11:20

## 2023-11-02 RX ADMIN — AMLODIPINE BESYLATE 5 MG: 5 TABLET ORAL at 17:35

## 2023-11-02 RX ADMIN — IOPAMIDOL 10 ML: 408 INJECTION, SOLUTION INTRATHECAL at 11:14

## 2023-11-02 NOTE — PLAN OF CARE
Problem: Adult Inpatient Plan of Care  Goal: Plan of Care Review  Outcome: Ongoing, Progressing  Flowsheets (Taken 11/2/2023 8944)  Progress: improving  Plan of Care Reviewed With:   patient   family   Goal Outcome Evaluation:  Plan of Care Reviewed With: patient, family        Progress: improving

## 2023-11-02 NOTE — PLAN OF CARE
Goal Outcome Evaluation:  Plan of Care Reviewed With: patient        Progress: improving  Outcome Evaluation: Patient transferred from Observation unit, alert and oriented x4, vss, afebrile, scattered bruising, bandaid to lumbar puncture is clean, dry and intact, falls precautions maintained, purewick in place, scds on, will continue to monitor.

## 2023-11-02 NOTE — PROGRESS NOTES
[facility] THORACIC/LUMBAR NEUROSURGERY PROGRESS NOTE      CC:back and LLE pain, falls      Subjective     Interval History: Patient reports no pain at present.  She has not been out of bed.  She is voiding without difficulty.    ROS:  Constitutional: No fever, chills  MS: back pain  Neuro: Left leg pain  : No difficulty voiding, no incontinence    Objective     Vital signs in last 24 hours:  Temp:  [98.2 °F (36.8 °C)-99.1 °F (37.3 °C)] 98.2 °F (36.8 °C)  Heart Rate:  [63-75] 63  Resp:  [16-18] 16  BP: (123-152)/(68-90) 152/90    Intake/Output this shift:  No intake/output data recorded.    LABS:  Results from last 7 days   Lab Units 11/01/23  0649 10/31/23  1302   WBC 10*3/mm3 7.06 10.23   HEMOGLOBIN g/dL 14.4 15.3   HEMATOCRIT % 43.0 45.7   PLATELETS 10*3/mm3 148 160     Results from last 7 days   Lab Units 11/01/23  0649 10/31/23  1302   SODIUM mmol/L 137 138   POTASSIUM mmol/L 4.0 4.1   CHLORIDE mmol/L 101 102   CO2 mmol/L 29.0 26.7   BUN mg/dL 17 21   CREATININE mg/dL 0.82 0.81   CALCIUM mg/dL 10.2 10.5   BILIRUBIN mg/dL  --  0.6   ALK PHOS U/L  --  88   ALT (SGPT) U/L  --  15   AST (SGOT) U/L  --  25   GLUCOSE mg/dL 112* 145*       IMAGING STUDIES:  MRI lumbar spine-diffuse degenerative changes with anterolisthesis L2 on 3 and L3 on 4 with dextroscoliosis and hyperlordosis of the lumbar spine.  Ligamentum flavum, facet hypertrophy, and bulging disc contribute to severe canal stenosis at L2/3 and L3/4 and foraminal stenosis most severe at left L2/3 and right L4/5.  Incidental AAA 4.5 cm.  Patient already seen by vascular surgeon.    I personally viewed and interpreted the patient's MRI lumbar spine.  Also reviewed by and discussed with Dr. Conti.    Meds reviewed/changed: Yes  Percocet 5 mg p.o. every 6 hours as needed-2 doses  Lovenox 40 mg subcu nightly    Physical Exam:    General:   Awake, alert.  Sitting up in bed.  No acute distress.  Pleasant and cooperative.  Back:    - SLR  Motor:    Normal muscle  strength, bulk and tone in lower extremities.   Reflexes:    no clonus  Sensation:   Normal to light touch [place]lando LEs  Station and Gait:             Per PT note 11/1-significant deficits with poor gait quality, impaired motor planning, impaired safety awareness, impaired functional mobiltiy, she needed constant hands on assist and verbal cues to prevent a fall while ambulating 40 ft with a rolling walker, she has a shuffling gait, forward lean, poor walker management with walker too far out in front of pt, she is at a high risk of falls, pt may benefit from short term SNF at discharge    Extremities:   Wearing SCD      Assessment & Plan     ASSESSMENT:      Multiple falls    Abdominal aneurysm    Chronic back pain    Patient presents with back pain, left leg pain and multiple falls.  She cannot tell me exactly why she falls.  She does not appear myelopathic on exam.  She has good strength when she is seated and denies pain in her leg when at rest.  Has minimal back pain at rest but with mobilization she has quite a bit of discomfort.  MRI reveals severe canal stenosis L2/3 and L3/4 with scoliosis and hyperlordosis.  She also has some foraminal stenosis on the left particularly at L2/3 in the right and L4/5.  Discussed patient's MRI findings with her and advised that we can try conservative management but if she fails she may need to consider surgical intervention although it may be more complex due to her deformity.  She states that she has no interest in surgical intervention.  I did advise her that epidurals may be of benefit but she will need some therapy and may be even rehab at discharge.  She seems convinced that she is going home today.  I told her that the date that she sees on the board in her room is an estimation but will depend on her safety and mobility.  We will try epidural as she is agreeable to that.  We will need to consider outpatient follow-up with Dr. Giles if she is agreeable to considering  "surgery in the future if she fails to improve.    PLAN:   LESI today  Continue therapy-May need rehab    I discussed the patient's findings and my recommendations with patient, nursing staff, and Dr. Conti    During patient visit, I utilized appropriate personal protective equipment including gloves. Appropriate PPE was worn during the entire visit.  Hand hygiene was completed before and after.      LOS: 1 day       Maura Morton, APRN  11/2/2023  09:34 EDT    \"Dictated utilizing Dragon dictation\".      "

## 2023-11-02 NOTE — ANESTHESIA PROCEDURE NOTES
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 All questions were answered. The patient was discharged with appropriate instructions.  Prep:  Pt Position:prone  Sterile Tech:cap, gloves, mask and sterile barrier  Prep:chlorhexidine gluconate and isopropyl alcohol  Monitoring:blood pressure monitoring, continuous pulse oximetry and EKG  Procedure:Sedation: no     Approach:left paramedian  Guidance: fluoroscopy  Location:lumbar  Level:4-5 (Interlaminar)  Needle Type:Tuohy  Needle Gauge:20 G  Aspiration:negative  Medications:  Preservative Free Saline:3mL  Isovue:2mL  Comments:Isovue dye spread was consistent with epidural placement.    Preservative-free dexamethasone 10 mg  Post Assessment:  Dressing:occlusive dressing applied  Pt Tolerance:patient tolerated the procedure well with no apparent complications  Complications:no

## 2023-11-02 NOTE — CONSULTS
Patient Name: Kirti Santillan  :  1945  MRN:  [id number]  Date of Admission: 10/31/2023    Subjective     Patient is a 77 y.o. female presents with chief complaint of acute on chronic, intermittent, excruciating low back and lateral left lower extremity pain.  Onset of symptoms was gradual starting a few years ago.  Symptoms are associated/aggravated by activity or walking for more than a few minutes. Symptoms improve with pain medication and rest..  On a pain scale from 0-10, she rates her pain as a 3 while at rest and a 10 with activity.  She describes the pain as sharp and stabbing in nature.  She was recently [facility] to [facility] after a mechanical fall.  She was referred to the pain clinic for a series of lumbar epidural steroid injections.    Her most recent lumbar MRI results are as follows:    FINDINGS:     There is transitional anatomy at the level of the lumbosacral junction  with partial sacralization of the L5 vertebral body. Please take careful  note of this enumeration system at the time of any potential  intervention     The conus medullaris terminates at the level of the lower body of L2 and  has normal signal intensity. The visualized distal thoracic spinal cord  is unremarkable in appearance.     There is mild dextroconvex scoliotic curvature of the lumbar spine with  its apex centered at L3.     At T11-12, there is a small right central disc protrusion minimally  indenting the ventral subarachnoid space. There is mild right foraminal  narrowing secondary to bulging disc material and loss of foraminal  height.     At T12-L1, there are advanced degenerative disc changes seen along the  right side of the disc space with adjacent degenerative endplate marrow  edema. There is mild left and moderate right foraminal narrowing  secondary to loss of foraminal height and bulging disc material. There  is mild central canal stenosis secondary to bulging disc material and  facet  arthropathy.     At L1-2, there is mild central canal stenosis secondary to bulging disc  material eccentric to the left in addition to ligamentum flavum  thickening and mild facet arthropathy. There is mild left foraminal  narrowing secondary to bulging disc material.     At L2-3, there is degenerative anterior spondylolisthesis of L2 to on L3  by approximately 5 mm. There is severe central canal stenosis secondary  to unroofed disc material, ligamentum flavum thickening, and facet  hypertrophic change. There is mild to moderate right and moderate to  severe left foraminal narrowing secondary to unroofed disc material and  facet hypertrophic change.     At L3-4, there is moderate to severe central canal stenosis secondary to  the anterior spondylolisthesis of L3 on L4 by approximately 5 to 6 mm,  ligamentum flavum thickening, and severe facet hypertrophic change.  Unroofed bulging disc material and facet hypertrophy results in a mild  degree of bilateral foraminal narrowing.     At L4-5, there is a disc bulge eccentric to the left which mildly  indents the ventral subarachnoid space. There is moderate right and mild  to moderate left foraminal narrowing secondary to bulging disc material  and facet hypertrophy.     At L5-S1, there is no significant degree of canal or foraminal stenosis.     Incidental note is made of an abdominal aortic aneurysm which measures  up to approximately 4.5 cm in maximal diameter. Please refer to the  recent CT scan of the abdomen and pelvis with regards to further details  of this aneurysm. Incidental note is made of a hyperplastic appearance  of the adrenal glands    The following portions of the patients history were reviewed and updated as appropriate: current medications, allergies, past medical history, past surgical history, past family history, past social history, and problem list                Objective     Past Medical History:   Past Medical History:   Diagnosis Date    Low  "back pain     Sciatica of left side      Past Surgical History:   Past Surgical History:   Procedure Laterality Date    EPIDURAL BLOCK      EYE SURGERY       Family History: History reviewed. No pertinent family history.  Social History:   Social History     Socioeconomic History    Marital status:    Tobacco Use    Smoking status: Former     Types: Cigarettes    Smokeless tobacco: Never   Vaping Use    Vaping Use: Never used   Substance and Sexual Activity    Alcohol use: Not Currently    Drug use: Never    Sexual activity: Defer       Vital Signs Range for the last 24 hours  Temperature: Temp:  [36.7 °C (98 °F)-37.3 °C (99.1 °F)] 36.7 °C (98 °F)   Temp Source: Temp src: Oral   BP: BP: (123-152)/(68-90) 147/85   Pulse: Heart Rate:  [63-75] 74   Respirations: Resp:  [16-18] 16   SPO2: SpO2:  [93 %-99 %] 93 %   O2 Amount (l/min):     O2 Devices Device (Oxygen Therapy): room air   Weight: Weight:  [78.5 kg (173 lb)] 78.5 kg (173 lb)     Flowsheet Rows      Flowsheet Row First Filed Value   Admission Height 160 cm (63\") Documented at 10/31/2023 1253   Admission Weight 78.8 kg (173 lb 11.6 oz) Documented at 10/31/2023 2205            --------------------------------------------------------------------------------    Current Facility-Administered Medications   Medication Dose Route Frequency Provider Last Rate Last Admin    acetaminophen (TYLENOL) tablet 1,000 mg  1,000 mg Oral Q8H PRN Jozef Winters MD   1,000 mg at 11/01/23 2058    sennosides-docusate (PERICOLACE) 8.6-50 MG per tablet 2 tablet  2 tablet Oral BID Hermelinda Martin MD        And    polyethylene glycol (MIRALAX) packet 17 g  17 g Oral Daily PRN Hermelinda Martin MD        And    bisacodyl (DULCOLAX) EC tablet 5 mg  5 mg Oral Daily PRN Hermelinda Martin MD        And    bisacodyl (DULCOLAX) suppository 10 mg  10 mg Rectal Daily PRN Hermelinda Martin MD        ondansetron (ZOFRAN) injection 4 mg  4 mg Intravenous Q6H PRN " Hermelinda Martin MD        oxyCODONE-acetaminophen (PERCOCET) 5-325 MG per tablet 1 tablet  1 tablet Oral Q6H PRN Jozef Winters MD   1 tablet at 11/02/23 0600    pantoprazole (PROTONIX) EC tablet 40 mg  40 mg Oral Q AM Jozef Winters MD   40 mg at 11/02/23 0600       --------------------------------------------------------------------------------  Assessment & Plan      Anesthesia Evaluation     Patient summary reviewed and Nursing notes reviewed   NPO Solid Status: > 8 hours  NPO Liquid Status: > 2 hours    Pain impairs ability to perform ADLs: Bathing, Dressing, Ambulation, Exercise/Activity and Toileting  Modalities previously tried to control pain with limited effectiveness within the last 4-6 weeks: Rest, OTC medications and Prescription medications     Airway   Mallampati: II  TM distance: >3 FB  Neck ROM: full  Dental - normal exam     Pulmonary - negative pulmonary ROS and normal exam    breath sounds clear to auscultation  Cardiovascular - negative cardio ROS and normal exam    Rhythm: regular  Rate: normal    (-) angina, orthopnea, PND, MONTENEGRO      Neuro/Psych- negative ROS  (+) numbness  GI/Hepatic/Renal/Endo - negative ROS     Musculoskeletal (-) negative ROS    Abdominal    Substance History - negative use     OB/GYN negative ob/gyn ROS         Other - negative ROS                    Diagnosis and Plan    Treatment Plan  ASA 2      Procedures: Lumbar Epidural Steroid Injection(LESI), With fluoroscopy,      Anesthetic plan and risks discussed with patient.        Alternative management options include physical therapy, ice baths, medical management with nonsteroidal anti-inflammatory medications or narcotics, chiropractic manipulation and surgical intervention.    1.  Lumbar epidural steroid injections.  If pain control is acceptable after 1 or 2 injections, it was discussed with the patient that they may return for the subsequent injections if and when their pain returns.  The risks were  discussed with the patient including failure of relief, worsening pain, Headache (post dural puncture headache), bleeding (epidural hematoma) and infection (epidural abscess or skin infection).  2.  Physical therapy exercises at home as prescribed by physical therapy or from the pain clinic handout (given to the patient).  Continuation of these exercises every day, or multiple times per week, even when the patient has good pain relief, was stressed to the patient as a preventative measure to decrease the frequency and severity of future pain episodes.  3.  Continue pain medicines as already prescribed.  If patient not currently taking any, it is recommended to begin Acetaminophen 1000 mg po q 8 hours.  If other medicines containing Acetaminophen are currently prescribed, maintain daily dose at 3000 mg.    4.  If they can tolerate NSAIDS, it is recommended to take Ibuprofen 600 mg po q 6 hours for 7 days during pain exacerbations.  Alternatively, they may substitute an NSAID of their choice (e.g. Aleve).  This may be taken at the same time as Acetaminophen.  5.  Heat and ice to the affected area as tolerated for pain control.  It was discussed that heating pads can cause burns.  6.  Daily low impact exercise such as walking or water exercise was recommended to maintain overall health and aid in weight control.   7.  Follow up as needed for subsequent injections.  8.  Patient was counseled to abstain from tobacco products.    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

## 2023-11-02 NOTE — DISCHARGE INSTRUCTIONS

## 2023-11-02 NOTE — PROGRESS NOTES
"    Name: Kirti Santillan ADMIT: 10/31/2023   : 1945  PCP: Deangelo Sprague MD    MRN: [id number] LOS: 1 days   AGE/SEX: 77 y.o. female  ROOM: 126/1     Subjective   Subjective   Laying in bed,, sleeping but arousable, denies current pain.  No fevers no chills.    Review of Systems   As above  Objective   Objective   Vital Signs  Temp:  [98 °F (36.7 °C)-99.1 °F (37.3 °C)] 98 °F (36.7 °C)  Heart Rate:  [63-85] 70  Resp:  [14-18] 16  BP: (123-199)/() 154/88  SpO2:  [92 %-99 %] 95 %  on   ;   Device (Oxygen Therapy): room air  Body mass index is 30.77 kg/m².  Physical Exam    General: Alert, laying in bed,  not in distress,  HEENT: Normocephalic, atraumatic  CV: Regular rate and rhythm, no murmurs rubs or gallops  Lungs: Clear to auscultation bilaterally, no crackles or wheezes  Abdomen: Soft, nontender, nondistended  Extremities: Trace edema lower extremities bilateral lower extremity weakness        Results Review     I reviewed the patient's new clinical results.  Results from last 7 days   Lab Units 23  0649 10/31/23  1302   WBC 10*3/mm3 7.06 10.23   HEMOGLOBIN g/dL 14.4 15.3   PLATELETS 10*3/mm3 148 160     Results from last 7 days   Lab Units 23  0649 10/31/23  1302   SODIUM mmol/L 137 138   POTASSIUM mmol/L 4.0 4.1   CHLORIDE mmol/L 101 102   CO2 mmol/L 29.0 26.7   BUN mg/dL 17 21   CREATININE mg/dL 0.82 0.81   GLUCOSE mg/dL 112* 145*   Estimated Creatinine Clearance: 56.2 mL/min (by C-G formula based on SCr of 0.82 mg/dL).  Results from last 7 days   Lab Units 10/31/23  1302   ALBUMIN g/dL 4.1   BILIRUBIN mg/dL 0.6   ALK PHOS U/L 88   AST (SGOT) U/L 25   ALT (SGPT) U/L 15     Results from last 7 days   Lab Units 23  0649 10/31/23  1302   CALCIUM mg/dL 10.2 10.5   ALBUMIN g/dL  --  4.1     No results found for: \"COVID19\"  Hemoglobin A1C   Date/Time Value Ref Range Status   2023 0556 6.30 (H) 4.80 - 5.60 % Final           [facility]  This procedure was " auto-finalized with no dictation required.    Scheduled Medications  fentanyl, 50 mcg, Intravenous, Once  lidocaine, 1 mL, Intradermal, Once  pantoprazole, 40 mg, Oral, Q AM  senna-docusate sodium, 2 tablet, Oral, BID    Infusions     Diet  Diet: Regular/House Diet; Texture: Regular Texture (IDDSI 7); Fluid Consistency: Thin (IDDSI 0)    I have personally reviewed     [x]  Laboratory   []  Microbiology   [x]  Radiology   []  EKG/Telemetry  []  Cardiology/Vascular   []  Pathology    []  Records       Assessment/Plan     Active Hospital Problems    Diagnosis  POA    **Multiple falls [R29.6]  Not Applicable    Lumbar radiculopathy [M54.16]  Unknown    Lumbar spinal stenosis [M48.061]  Unknown    Abdominal aneurysm [I71.40]  Unknown    Chronic back pain [M54.9, G89.29]  Unknown      Resolved Hospital Problems   No resolved problems to display.       77 y.o. female admitted with Multiple falls.  B12 and folate normal    Lumbar spinal stenosis/ weakness/falls  -TSH, B12 normal  CT scan of the cervical, lumbar and thoracic spine showed multilevel degenerative disc disease, and moderate to severe stenosis of lumbar region  --Follow-up MRI showed  canal stenosis L2/3 and L3/4 with scoliosis and hyperlordosis.     -Weakness, pain secondary to stenosis  -Neurosurgery evaluated, plan for conservative management, may need surgery does not improve.  Plan for epidural injection today.  -PRN  Percocet for pain.   -bowel regimen  - PT OT.          Lung nodule: CT scan showed right upper lobe pulmonary nodule.  Will need 6-month follow-up CT scan beginning of May/2023.            Hyperglycemia: Globin A1c 6.3, consistent with prediabetes.  Diet, lifestyle modifications recommended.    Abdominal aneurysm: CT scan showed infrarenal 4.3 cm abdominal aortic aneurysm. Vascular surgery evaluated..   6 weeks outpatient follow-up with abdominal ultrasound recommended.    SCDs, DVT prophylaxis.  Hold Lovenox secondary to planned  epidural.  Full code.  Discussed with patient and spouse.  Anticipate discharge to be determined      Copied text in this note has been reviewed and is accurate as of 11/02/23.         Dictated utilizing Dragon dictation        Jozef Winters MD  [facility]  11/02/23  11:52 EDT

## 2023-11-03 ENCOUNTER — HOME HEALTH ADMISSION (OUTPATIENT)
Dept: HOME HEALTH SERVICES | Facility: HOME HEALTHCARE | Age: 78
End: 2023-11-03
Payer: MEDICARE

## 2023-11-03 PROBLEM — I10 HIGH BLOOD PRESSURE: Status: ACTIVE | Noted: 2023-11-03

## 2023-11-03 LAB
ANION GAP SERPL CALCULATED.3IONS-SCNC: 6.8 MMOL/L (ref 5–15)
BUN SERPL-MCNC: 17 MG/DL (ref 8–23)
BUN/CREAT SERPL: 23.6 (ref 7–25)
CALCIUM SPEC-SCNC: 10 MG/DL (ref 8.6–10.5)
CHLORIDE SERPL-SCNC: 104 MMOL/L (ref 98–107)
CO2 SERPL-SCNC: 27.2 MMOL/L (ref 22–29)
CREAT SERPL-MCNC: 0.72 MG/DL (ref 0.57–1)
DEPRECATED RDW RBC AUTO: 44.6 FL (ref 37–54)
EGFRCR SERPLBLD CKD-EPI 2021: 85.7 ML/MIN/1.73
ERYTHROCYTE [DISTWIDTH] IN BLOOD BY AUTOMATED COUNT: 13.4 % (ref 12.3–15.4)
GLUCOSE SERPL-MCNC: 121 MG/DL (ref 65–99)
HCT VFR BLD AUTO: 44.4 % (ref 34–46.6)
HGB BLD-MCNC: 14.7 G/DL (ref 12–15.9)
MCH RBC QN AUTO: 29.8 PG (ref 26.6–33)
MCHC RBC AUTO-ENTMCNC: 33.1 G/DL (ref 31.5–35.7)
MCV RBC AUTO: 89.9 FL (ref 79–97)
PLATELET # BLD AUTO: 144 10*3/MM3 (ref 140–450)
PMV BLD AUTO: 11.5 FL (ref 6–12)
POTASSIUM SERPL-SCNC: 3.9 MMOL/L (ref 3.5–5.2)
RBC # BLD AUTO: 4.94 10*6/MM3 (ref 3.77–5.28)
SODIUM SERPL-SCNC: 138 MMOL/L (ref 136–145)
WBC NRBC COR # BLD: 5.87 10*3/MM3 (ref 3.4–10.8)

## 2023-11-03 PROCEDURE — 99231 SBSQ HOSP IP/OBS SF/LOW 25: CPT | Performed by: NURSE PRACTITIONER

## 2023-11-03 PROCEDURE — 97530 THERAPEUTIC ACTIVITIES: CPT

## 2023-11-03 PROCEDURE — 97116 GAIT TRAINING THERAPY: CPT

## 2023-11-03 PROCEDURE — 25010000002 ENOXAPARIN PER 10 MG: Performed by: STUDENT IN AN ORGANIZED HEALTH CARE EDUCATION/TRAINING PROGRAM

## 2023-11-03 PROCEDURE — 97535 SELF CARE MNGMENT TRAINING: CPT

## 2023-11-03 PROCEDURE — 85027 COMPLETE CBC AUTOMATED: CPT | Performed by: STUDENT IN AN ORGANIZED HEALTH CARE EDUCATION/TRAINING PROGRAM

## 2023-11-03 PROCEDURE — 80048 BASIC METABOLIC PNL TOTAL CA: CPT | Performed by: STUDENT IN AN ORGANIZED HEALTH CARE EDUCATION/TRAINING PROGRAM

## 2023-11-03 RX ORDER — ENOXAPARIN SODIUM 100 MG/ML
40 INJECTION SUBCUTANEOUS EVERY 24 HOURS
Status: DISCONTINUED | OUTPATIENT
Start: 2023-11-03 | End: 2023-11-07 | Stop reason: HOSPADM

## 2023-11-03 RX ADMIN — DOCUSATE SODIUM 50MG AND SENNOSIDES 8.6MG 2 TABLET: 8.6; 5 TABLET, FILM COATED ORAL at 08:56

## 2023-11-03 RX ADMIN — PANTOPRAZOLE SODIUM 40 MG: 40 TABLET, DELAYED RELEASE ORAL at 05:11

## 2023-11-03 RX ADMIN — AMLODIPINE BESYLATE 5 MG: 5 TABLET ORAL at 08:56

## 2023-11-03 RX ADMIN — OXYCODONE HYDROCHLORIDE AND ACETAMINOPHEN 1 TABLET: 5; 325 TABLET ORAL at 05:11

## 2023-11-03 RX ADMIN — ENOXAPARIN SODIUM 40 MG: 100 INJECTION SUBCUTANEOUS at 16:03

## 2023-11-03 NOTE — CASE MANAGEMENT/SOCIAL WORK
Continued Stay Note  [facility]     Patient Name: Kirti Santillan  MRN: [id number]  Today's Date: 11/3/2023    Admit Date: 10/31/2023    Plan: SNF- awaiting choices from spouse   Discharge Plan       Row Name 11/03/23 2230       Plan    Plan SNF- awaiting choices from spouse    Patient/Family in Agreement with Plan yes    Plan Comments CCP spoke with patient who is now agreeable to SNF per therapy recs. Patient requested CCP to call her  for SNF choices. CCP spoke with Don, patient's  and reviewed SNF's closest to their home. Don will review places and call CCP main office with choices. CCP following. Abby VAUGHN RN CCP                   Discharge Codes    No documentation.                 Expected Discharge Date and Time       Expected Discharge Date Expected Discharge Time    Nov 2, 2023               Abby Uriostegui RN

## 2023-11-03 NOTE — DISCHARGE PLACEMENT REQUEST
"Kirti Santillan (77 y.o. Female)       Date of Birth   1945    Social Security Number       Address   [address] [place] [address]    Home Phone   [phone]    MRN   [id number]       Faith   Tenriism    Marital Status                               Admission Date   10/31/23    Admission Type   Emergency    Admitting Provider   Hermelinda Martin MD    Attending Provider   Jozef Winters MD    Department, Room/Bed   [address], 88/1       Discharge Date       Discharge Disposition       Discharge Destination                                 Attending Provider: Jozef Winters MD    Allergies: Hydrocodone    Isolation: None   Infection: None   Code Status: CPR    Ht: 160 cm (63\")   Wt: 78.8 kg (173 lb 11.6 oz)    Admission Cmt: None   Principal Problem: Multiple falls [R29.6]                   Active Insurance as of 10/31/2023       Primary Coverage       Payor Plan Insurance Group Employer/Plan Group    MEDICARE MEDICARE A & B        Payor Plan Address Payor Plan Phone Number Payor Plan Fax Number Effective Dates    [address] [phone]  11/1/2009 - None Entered    [place] [id number]         Subscriber Name Subscriber Birth Date Member ID       KIRTI SANTILLAN 1945 [id number]                     Emergency Contacts        (Rel.) Home Phone Work Phone Mobile Phone    Kenny Santillan (Spouse) -- -- [phone]    Robinson Santillan (Son) [phone] -- --              {Outbreak/Travel/Exposure Documentation......;  Question Available Choices Patient Response   COVID-19 Outbreak Screen:  Do you currently have a new onset of the following symptoms?        Fever/Chills, Cough, Shortness of air, Loss of taste or smell, No, Unknown  No (10/31/23 2207)   COVID-19 Outbreak Screen: In the last 14 days, have you had contact with anyone who is ill, has show any of the symptoms listed above and/or has been diagnosis with the 2019 Novel " Coronavirus? This includes any immediate household members but excludes any patients with whom you have been in contact within your normal work duties wearing proper PPE, if you are a healthcare worker.  Yes, No, Unknown              (not recorded)   COVID-19 Outbreak Screen: Who was notified? Free text (not recorded)   Ebola Screening Outbreak Screen: Have you traveled to the [place] the [place] or [place] within the past 21 days?  Yes, No, Unknown (not recorded)   Ebola Screening Outbreak Screen: Do you have ANY of the following symptoms: Fever/Chills, Vomiting, Diarrhea, Fatigue, Headache, Muscle pain, Unexplained bleeding, Abdominal (stomach) pain, No, Unknown (not recorded)   Ebola Screening Outbreak Screen: Name of Person notified Free text (not recorded)   Travel Screen: Have you traveled in the last month? If so, to what country have you traveled? If [place] what state? Yes, No, Unknown  List of all countries  List of all States No (10/31/23 1222)  (not recorded)  (not recorded)   Infection Risk: Do you currently have the following symptoms?  (If cough is selected, the Tuberculosis Screen is performed.) Cough, Fever, Rash, No (!) Cough (10/31/23 1222)   Tuberculosis Screen: Do you have any of the following Tuberculosis Risks?  Have you lived or spent time with anyone who had or may have TB?  Have you lived in or visited any of the following areas for more than one month: [place], [place], [place], [place] or [place], the [place] or [place]?  Do you have HIV/AIDS?  Have you lived in or worked in a nursing home, homeless shelter, correctional facility, or substance abuse treatment facility?   No    If Yes do you have any of the following symptoms? Yes responses display to the right    If Yes, symptoms listed are:  Cough greater than or equal to 3 weeks, Loss of appetite, Unexplained weight loss, Night sweats, Bloody sputum or hemoptysis, Hoarseness, Fever, Fatigue, Chest pain, No (not  recorded)  (not recorded)   Exposure Screen: Have you been exposed to any of these contagious diseases in the last month? Measles, Chickenpox, Meningitis, Pertussis, Whooping Cough, No No (10/31/23 9474)

## 2023-11-03 NOTE — PLAN OF CARE
Goal Outcome Evaluation:  Plan of Care Reviewed With: patient, spouse        Progress: improving  Outcome Evaluation: Pt seen for PT this PM and tolerated mobility well. Pt transferred to EOB with CGA, STS with CGA, and ambulated 40ft with CGA and rwx. Pt continues with slow pace and shuffled steps. Significant forward head and forward flexed posture with inability to correct. Pt unsteady but no overt LOB. Pt fatigues quickly limiting gait distance and requests return to room. Pt agreeable to sitting UIC with max encouragement. Increased time spent at end of session discussing DC plans - spouse expressing concern caring for pt at home and pt is adamant she is able to do everything for herself. Pt's spouse states pt isn't very active at BL, but is not getting around her at her BL either. Spouse would like for pt to go to SNF though pt adamantly refuses. PT educated pt about not using purewick and calling out to get up to bathroom with nsg throughout the day as well as sitting UIC. PT will continue to follow to progress mobility as tolerated. Recommending SNf however pt likely will continue to refuse, so will need HHPT at DC with family assist.      Anticipated Discharge Disposition (PT): skilled nursing facility, home with assist, home with home health

## 2023-11-03 NOTE — PROGRESS NOTES
Name: Kirti Santillan ADMIT: 10/31/2023   : 1945  PCP: Deangelo Sprague MD    MRN: [id number] LOS: 2 days   AGE/SEX: 77 y.o. female  ROOM: [facility]     Subjective   Subjective   No acute events overnight.  Laying in bed, spouse present at bedside.  Status post epidural injection to lumbar spine report notes improvement in pain.    Review of Systems   As above  Objective   Objective   Vital Signs  Temp:  [97 °F (36.1 °C)-98.2 °F (36.8 °C)] 97.2 °F (36.2 °C)  Heart Rate:  [66-84] 75  Resp:  [16] 16  BP: (143-158)/(81-91) 150/81  SpO2:  [92 %-93 %] 92 %  on   ;   Device (Oxygen Therapy): room air  Body mass index is 30.77 kg/m².  Physical Exam    General: Alert, oriented x3, laying in bed,  not in distress,  HEENT: Normocephalic, atraumatic  CV: Regular rate and rhythm, no murmurs rubs or gallops  Lungs: Clear to auscultation bilaterally, no crackles or wheezes  Abdomen: Soft, nontender, nondistended  Extremities: Trace edema lower extremities, bilateral lower extremity weakness        Results Review     I reviewed the patient's new clinical results.  Results from last 7 days   Lab Units 23  0604 23  0649 10/31/23  1302   WBC 10*3/mm3 5.87 7.06 10.23   HEMOGLOBIN g/dL 14.7 14.4 15.3   PLATELETS 10*3/mm3 144 148 160     Results from last 7 days   Lab Units 23  0604 23  0649 10/31/23  1302   SODIUM mmol/L 138 137 138   POTASSIUM mmol/L 3.9 4.0 4.1   CHLORIDE mmol/L 104 101 102   CO2 mmol/L 27.2 29.0 26.7   BUN mg/dL 17 17 21   CREATININE mg/dL 0.72 0.82 0.81   GLUCOSE mg/dL 121* 112* 145*   Estimated Creatinine Clearance: 64 mL/min (by C-G formula based on SCr of 0.72 mg/dL).  Results from last 7 days   Lab Units 10/31/23  1302   ALBUMIN g/dL 4.1   BILIRUBIN mg/dL 0.6   ALK PHOS U/L 88   AST (SGOT) U/L 25   ALT (SGPT) U/L 15     Results from last 7 days   Lab Units 23  0604 23  0649 10/31/23  1302   CALCIUM mg/dL 10.0 10.2 10.5   ALBUMIN g/dL  --   --  4.1     No results  "found for: \"COVID19\"  Hemoglobin A1C   Date/Time Value Ref Range Status   11/02/2023 0556 6.30 (H) 4.80 - 5.60 % Final           [facility] Guided [facility] Management Spine  This procedure was auto-finalized with no dictation required.    Scheduled Medications  amLODIPine, 5 mg, Oral, Q24H  enoxaparin, 40 mg, Subcutaneous, Q24H  fentanyl, 50 mcg, Intravenous, Once  lidocaine, 1 mL, Intradermal, Once  pantoprazole, 40 mg, Oral, Q AM  senna-docusate sodium, 2 tablet, Oral, BID    Infusions     Diet  Diet: Regular/House Diet; Texture: Regular Texture (IDDSI 7); Fluid Consistency: Thin (IDDSI 0)    I have personally reviewed     [x]  Laboratory   []  Microbiology   [x]  Radiology   []  EKG/Telemetry  []  Cardiology/Vascular   []  Pathology    []  Records       Assessment/Plan     Active Hospital Problems    Diagnosis  POA    **Multiple falls [R29.6]  Not Applicable    High blood pressure [I10]  Unknown    Lumbar radiculopathy [M54.16]  Unknown    Lumbar spinal stenosis [M48.061]  Unknown    Abdominal aneurysm [I71.40]  Unknown    Chronic back pain [M54.9, G89.29]  Unknown      Resolved Hospital Problems   No resolved problems to display.       77 y.o. female admitted with Multiple falls.  B12 and folate normal    Lumbar spinal stenosis/ weakness/falls  -TSH, B12 normal  CT scan of the cervical, lumbar and thoracic spine showed multilevel degenerative disc disease, and moderate to severe stenosis of lumbar region  --Follow-up MRI showed  canal stenosis L2/3 and L3/4 with scoliosis and hyperlordosis.     -Weakness, pain secondary to stenosis  - Status post epidural 11/02/202  -Neurosurgery evaluated, plan for conservative management, may need surgery does not improve.   -PRN  Percocet for pain.   -bowel regimen  - PT OT.  -Recommended rehab, however patient refuses rehab and wants to be discharged home with home health.  -Neurosurgery evaluated, will need 1 month follow-up outpatient, may need surgery if fails conservative " management.        Lung nodule: CT scan showed right upper lobe pulmonary nodule.  Will need 6-month follow-up CT scan beginning of May/2023.      High blood pressure, BP remains persistently elevated, no history of hypertension, not on BP meds at home.  Initiated on amlodipine 5 g daily, monitoring adjust as needed.      Hyperglycemia: Globin A1c 6.3, consistent with prediabetes.  Diet, lifestyle modifications recommended.    Abdominal aneurysm: CT scan showed infrarenal 4.3 cm abdominal aortic aneurysm. Vascular surgery evaluated..   6 weeks outpatient follow-up with abdominal ultrasound recommended.    DVT prophylax: Initiate Lovenox  Full code.  Discussed with patient and spouse.  Anticipate discharge home with home health, tomorrow, patient refuses rehab.      Copied text in this note has been reviewed and is accurate as of 11/03/23.         Dictated utilizing Dragon dictation        Jozef Winters MD  [facility]  11/03/23  15:07 EDT

## 2023-11-03 NOTE — PLAN OF CARE
Goal Outcome Evaluation:  Plan of Care Reviewed With: patient           Outcome Evaluation: VSS, no c/o pain or nausea, bandaid over lumbar puncture CDI, on falls precautions with bed alarm, SCDs on, purewick in place, will continue to monitor.

## 2023-11-03 NOTE — THERAPY TREATMENT NOTE
Patient Name: Kirti Santillan  : 1945    MRN: [id number]                              Today's Date: 11/3/2023       Admit Date: 10/31/2023    Visit Dx:     ICD-10-CM ICD-9-CM   1. Bilateral leg weakness  R29.898 729.89   2. Lung nodule  R91.1 793.11   3. Abdominal aortic aneurysm (AAA) without rupture, unspecified part  I71.40 441.4   4. Multiple falls  R29.6 V15.88   5. Lumbar radiculopathy  M54.16 724.4     Patient Active Problem List   Diagnosis    Multiple falls    Abdominal aneurysm    Chronic back pain    Lumbar radiculopathy    Lumbar spinal stenosis    High blood pressure     Past Medical History:   Diagnosis Date    High blood pressure 11/3/2023    Low back pain     Lumbar radiculopathy 2023    Sciatica of left side      Past Surgical History:   Procedure Laterality Date    EPIDURAL BLOCK      EYE SURGERY        General Information       Row Name 23 1511          OT Time and Intention    Document Type therapy note (daily note)  -MW     Mode of Treatment occupational therapy;individual therapy  -MW       Row Name 23 1511          General Information    Patient Profile Reviewed yes  -MW     Existing Precautions/Restrictions fall  -MW       Row Name 23 151          Cognition    Orientation Status (Cognition) oriented x 3  -MW       Row Name 23 151          Safety Issues, Functional Mobility    Impairments Affecting Function (Mobility) balance;endurance/activity tolerance;strength  -MW               User Key  (r) = Recorded By, (t) = Taken By, (c) = Cosigned By      Initials Name Provider Type    MW Debbie Mosley OT Occupational Therapist                     Mobility/ADL's       Row Name 23 1511          Bed Mobility    Bed Mobility supine-sit;sit-supine  -MW     Supine-Sit Shelly (Bed Mobility) contact guard;verbal cues  -MW     Sit-Supine Shelly (Bed Mobility) minimum assist (75% patient effort)  -MW     Assistive Device (Bed Mobility) bed rails   -     Comment, (Bed Mobility) increased time required to come to EOB  -[facility] Name 11/03/23 1511          Transfers    Transfers sit-stand transfer  -[facility] Name 11/03/23 1511          Sit-Stand Transfer    Sit-Stand Winston (Transfers) minimum assist (75% patient effort)  -     Assistive Device (Sit-Stand Transfers) walker, front-wheeled  -[facility] Name 11/03/23 1511          Toilet Transfer    Comment, (Toilet Transfer) declined need to void, demo'd approp distance  -[facility] Name 11/03/23 1511          Functional Mobility    Functional Mobility- Ind. Level contact guard assist  -     Functional Mobility- Device walker, front-wheeled  -     Functional Mobility- Comment to sinkside  -[facility] Name 11/03/23 1511          Activities of Daily Living    BADL Assessment/Intervention grooming;lower body dressing  -[facility] Name 11/03/23 1511          Lower Body Dressing Assessment/Training    [place] Level (Lower Body Dressing) lower body dressing skills;doff;don;moderate assist (50% patient effort);socks;shoes/slippers  -     Position (Lower Body Dressing) edge of bed sitting  -[facility] Name 11/03/23 1511          Grooming Assessment/Training    [place] Level (Grooming) grooming skills;oral care regimen;set up;contact guard assist  -     Position (Grooming) sink side;supported standing;supported sitting  -     Comment, (Grooming) pt required x2 seated rest breaks due to poor activity tolerance throughout g/h routine and before proceeding to return to EOB  -MW               User Key  (r) = Recorded By, (t) = Taken By, (c) = Cosigned By      Initials Name Provider Type    Debbie Kitchen OT Occupational Therapist                   Obj/Interventions       Row Name 11/03/23 1513          Motor Skills    Motor Skills functional endurance  -     Functional Endurance quick to fatigue  -[facility] Name 11/03/23 1513          Balance    Balance Assessment  sitting static balance;sitting dynamic balance;sit to stand dynamic balance;standing static balance;standing dynamic balance  -MW     Static Sitting Balance standby assist  -MW     Dynamic Sitting Balance standby assist  -MW     Position, Sitting Balance sitting edge of bed  -MW     Sit to Stand Dynamic Balance contact guard;minimal assist  -MW     Static Standing Balance contact guard  -MW     Dynamic Standing Balance contact guard;minimal assist  -MW     Position/Device Used, Standing Balance supported;walker, front-wheeled  -MW     Balance Interventions occupation based/functional task;minimal challenge  -MW     Comment, Balance no overt LOBs, poor posture however pt reports this is baseline  -MW               User Key  (r) = Recorded By, (t) = Taken By, (c) = Cosigned By      Initials Name Provider Type    Debbie Kitchen OT Occupational Therapist                   Goals/Plan    No documentation.                  Clinical Impression       Row Name 11/03/23 1513          Pain Assessment    Pretreatment Pain Rating 0/10 - no pain  -MW     Posttreatment Pain Rating 0/10 - no pain  -MW       Row Name 11/03/23 1513          Plan of Care Review    Plan of Care Reviewed With patient;spouse  -     Progress improving  -     Outcome Evaluation Pt seen in PM for OT, pt agreeable, reports no back pain on arrival. Demo's upright ADLs at sinkside this date, mostly CGA, however pt requiring x2 seated rest breaks at sinkside due to fatigue and weakness. use of Rwx for func mob to and from sinkside. Continue to progress as pt able to tolerate. Pending SNF vs HHOT at d/c. Feel pt could benefit from continued rehab at d/c to ensure safety once returning home.  -       Row Name 11/03/23 1513          Therapy Plan Review/Discharge Plan (OT)    Anticipated Discharge Disposition (OT) skilled nursing facility  -       Row Name 11/03/23 1513          Positioning and Restraints    Pre-Treatment Position in bed  -     Post  Treatment Position bed  -MW     In Bed notified nsg;fowlers;call light within reach;encouraged to call for assist;exit alarm on;with family/caregiver;side rails up x3  -MW               User Key  (r) = Recorded By, (t) = Taken By, (c) = Cosigned By      Initials Name Provider Type    Debbie Kitchen OT Occupational Therapist                   Outcome Measures       Row Name 11/03/23 1516          How much help from another is currently needed...    Putting on and taking off regular lower body clothing? 2  -MW     Bathing (including washing, rinsing, and drying) 2  -MW     Toileting (which includes using toilet bed pan or urinal) 3  -MW     Putting on and taking off regular upper body clothing 3  -MW     Taking care of personal grooming (such as brushing teeth) 3  -MW     Eating meals 4  -MW     AM-PAC 6 Clicks Score (OT) 17  -MW       Row Name 11/03/23 0830          How much help from another person do you currently need...    Turning from your back to your side while in flat bed without using bedrails? 2  -PS     Moving from lying on back to sitting on the side of a flat bed without bedrails? 2  -PS     Moving to and from a bed to a chair (including a wheelchair)? 2  -PS     Standing up from a chair using your arms (e.g., wheelchair, bedside chair)? 3  -PS     Climbing 3-5 steps with a railing? 2  -PS     To walk in hospital room? 2  -PS     AM-PAC 6 Clicks Score (PT) 13  -PS     Highest level of mobility 4 --> Transferred to chair/commode  -PS       Row Name 11/03/23 1516          Functional Assessment    Outcome Measure Options AM-PAC 6 Clicks Daily Activity (OT)  -MW               User Key  (r) = Recorded By, (t) = Taken By, (c) = Cosigned By      Initials Name Provider Type    Keshawn Jones RN Registered Nurse    Debbie Kitchen OT Occupational Therapist                    Occupational Therapy Education       Title: PT OT SLP Therapies (Done)       Topic: Occupational Therapy (Done)        Point: ADL training (Done)       Description:   Instruct learner(s) on proper safety adaptation and remediation techniques during self care or transfers.   Instruct in proper use of assistive devices.                  Learning Progress Summary             Patient Acceptance, E, VU by BLADE at 11/1/2023 1048                                         User Key       Initials Effective Dates Name Provider Type Margarette MURGUIA 08/02/22 -  Valentin Ga OT Occupational Therapist OT                  OT Recommendation and Plan     Plan of Care Review  Plan of Care Reviewed With: patient, spouse  Progress: improving  Outcome Evaluation: Pt seen in PM for OT, pt agreeable, reports no back pain on arrival. Demo's upright ADLs at sinkside this date, mostly CGA, however pt requiring x2 seated rest breaks at sinkside due to fatigue and weakness. use of Rwx for func mob to and from sinkside. Continue to progress as pt able to tolerate. Pending SNF vs HHOT at d/c. Feel pt could benefit from continued rehab at d/c to ensure safety once returning home.     Time Calculation:         Time Calculation- OT       Row Name 11/03/23 1516             Time Calculation- OT    OT Start Time 1314  -MW      OT Stop Time 1343  -MW      OT Time Calculation (min) 29 min  -MW      Total Timed Code Minutes- OT 29 minute(s)  -MW      OT Received On 11/03/23  -MW      OT - Next Appointment 11/06/23  -MW         Timed Charges    48956 - OT Therapeutic Activity Minutes 10  -MW      95611 - OT Self Care/Mgmt Minutes 19  -MW         Total Minutes    Timed Charges Total Minutes 29  -MW       Total Minutes 29  -MW                User Key  (r) = Recorded By, (t) = Taken By, (c) = Cosigned By      Initials Name Provider Type    Debbie Kitchen OT Occupational Therapist                  Therapy Charges for Today       Code Description Service Date Service Provider Modifiers Qty    31868235819  OT THERAPEUTIC ACT EA 15 MIN 11/3/2023 Debbie Mosley OT GO 1     [id number] HC OT SELF CARE/MGMT/TRAIN EA 15 MIN 11/3/2023 Debbie Mosley OT GO 1                 Debbie Mosley OT  11/3/2023

## 2023-11-03 NOTE — PROGRESS NOTES
[facility] THORACIC/LUMBAR NEUROSURGERY PROGRESS NOTE      CC:back and LLE pain, falls      Subjective     Interval History: had MORRIS yesterday.  Reports she is feeling well today.  Denies back or leg pain.  Awaiting PT, but walked to bathroom with OT    ROS:  Constitutional: No fever, chills  MS: back pain  Neuro: Left leg pain  : No difficulty voiding, no incontinence    Objective     Vital signs in last 24 hours:  Temp:  [97 °F (36.1 °C)-98.2 °F (36.8 °C)] 97.2 °F (36.2 °C)  Heart Rate:  [66-84] 75  Resp:  [16] 16  BP: (143-158)/(81-91) 150/81    Intake/Output this shift:  I/O this shift:  In: -   Out: 300 [Urine:300]    LABS:  Results from last 7 days   Lab Units 11/03/23  0604 11/01/23  0649 10/31/23  1302   WBC 10*3/mm3 5.87 7.06 10.23   HEMOGLOBIN g/dL 14.7 14.4 15.3   HEMATOCRIT % 44.4 43.0 45.7   PLATELETS 10*3/mm3 144 148 160     Results from last 7 days   Lab Units 11/03/23  0604 11/01/23  0649 10/31/23  1302   SODIUM mmol/L 138 137 138   POTASSIUM mmol/L 3.9 4.0 4.1   CHLORIDE mmol/L 104 101 102   CO2 mmol/L 27.2 29.0 26.7   BUN mg/dL 17 17 21   CREATININE mg/dL 0.72 0.82 0.81   CALCIUM mg/dL 10.0 10.2 10.5   BILIRUBIN mg/dL  --   --  0.6   ALK PHOS U/L  --   --  88   ALT (SGPT) U/L  --   --  15   AST (SGOT) U/L  --   --  25   GLUCOSE mg/dL 121* 112* 145*       IMAGING STUDIES:  No new imaging    I personally viewed and interpreted the patient's chart    Meds reviewed/changed: Yes  Percocet 5 mg p.o. every 6 hours as needed-1 doses  Lovenox 40 mg subcu nightly    Physical Exam:    General:   Awake, alert.  Sitting up in chair at sink   back:    - SLR  Motor:    Normal muscle strength, bulk and tone in lower extremities.   Station and Gait:             I observed patient ambulate from bathroom to bed with rolling walker and contact-guard assist of 1.  Forward flexed gait and short, shuffled steps  Extremities:   No leg swelling      Assessment & Plan     ASSESSMENT:      Multiple falls    Abdominal  "aneurysm    Chronic back pain    Lumbar radiculopathy    Lumbar spinal stenosis    Patient with lumbar stenosis and scoliotic/hyperlordotic deformity had epidural injection yesterday.  She reports no pain in her back or leg today and states \"I feel pretty good.\"  I observed her ambulate from bathroom to bed with stable gait although shuffled and forward flexed.  She seems to have responded to the epidural and would likely benefit from a series of these as well as some rehab.  She again states she is not interested in surgery.  We will have her follow-up in about a month to evaluate her response to epidurals to see if it is time to schedule a series at that point.  Dr. Giles is one of our complex spine surgeons and in the event that she fails conservative management and would consider surgery, he would be best to evaluate the patient.      PLAN:   Follow-up neurosurgery 1 month to discuss further epidural injections  We will schedule with Dr. Giles in the event patient fails conservative management and will consider surgery  Recommend rehab at discharge    I discussed the patient's findings and my recommendations with patient, family, and Dr. Conti    During patient visit, I utilized appropriate personal protective equipment including gloves. Appropriate PPE was worn during the entire visit.  Hand hygiene was completed before and after.      LOS: 2 days       Maura Morton, APRN  11/3/2023  13:21 EDT    \"Dictated utilizing Dragon dictation\".      "

## 2023-11-03 NOTE — THERAPY TREATMENT NOTE
Patient Name: Kirti Santillan  : 1945    MRN: [id number]                              Today's Date: 11/3/2023       Admit Date: 10/31/2023    Visit Dx:     ICD-10-CM ICD-9-CM   1. Bilateral leg weakness  R29.898 729.89   2. Lung nodule  R91.1 793.11   3. Abdominal aortic aneurysm (AAA) without rupture, unspecified part  I71.40 441.4   4. Multiple falls  R29.6 V15.88   5. Lumbar radiculopathy  M54.16 724.4     Patient Active Problem List   Diagnosis    Multiple falls    Abdominal aneurysm    Chronic back pain    Lumbar radiculopathy    Lumbar spinal stenosis    High blood pressure     Past Medical History:   Diagnosis Date    High blood pressure 11/3/2023    Low back pain     Lumbar radiculopathy 2023    Sciatica of left side      Past Surgical History:   Procedure Laterality Date    EPIDURAL BLOCK      EYE SURGERY        General Information       Row Name 23 1536          Physical Therapy Time and Intention    Document Type therapy note (daily note)  -     Mode of Treatment individual therapy;physical therapy  -       Row Name 23 1536          General Information    Patient Profile Reviewed yes  -     Existing Precautions/Restrictions fall  -       Row Name 23 153          Cognition    Orientation Status (Cognition) oriented x 3  -       Row Name 23 1536          Safety Issues, Functional Mobility    Impairments Affecting Function (Mobility) balance;endurance/activity tolerance;strength  -               User Key  (r) = Recorded By, (t) = Taken By, (c) = Cosigned By      Initials Name Provider Type     Cristine Jorge PT Physical Therapist                   Mobility       Row Name 23 1537          Bed Mobility    Bed Mobility supine-sit  -     Supine-Sit [place] (Bed Mobility) contact guard;verbal cues  -     Sit-Supine [place] (Bed Mobility) not tested  NT - UIC  -     Assistive Device (Bed Mobility) bed rails;head of bed elevated  -        Row Name 11/03/23 1537          Sit-Stand Transfer    Sit-Stand [place] (Transfers) contact guard;verbal cues  -     Assistive Device (Sit-Stand Transfers) walker, front-wheeled  -       Row Name 11/03/23 1537          Gait/Stairs (Locomotion)    [place] Level (Gait) verbal cues;contact guard  -     Assistive Device (Gait) walker, front-wheeled  -     Distance in Feet (Gait) 40ft  -     Deviations/Abnormal Patterns (Gait) base of support, narrow;davon decreased;festinating/shuffling;stride length decreased;gait speed decreased  -     Comment, (Gait/Stairs) Gait distance limited by fatigue, poor posture, slow/shuffled steps  -               User Key  (r) = Recorded By, (t) = Taken By, (c) = Cosigned By      Initials Name Provider Type     Cristine Jorge, PT Physical Therapist                   Obj/Interventions    No documentation.                  Goals/Plan    No documentation.                  Clinical Impression       Row Name 11/03/23 1538          Pain    Pretreatment Pain Rating 0/10 - no pain  -     Posttreatment Pain Rating 0/10 - no pain  -[facility] Name 11/03/23 1538          Plan of Care Review    Plan of Care Reviewed With patient;spouse  -     Progress improving  -     Outcome Evaluation Pt seen for PT this PM and tolerated mobility well. Pt transferred to EOB with CGA, STS with CGA, and ambulated 40ft with CGA and rwx. Pt continues with slow pace and shuffled steps. Significant forward head and forward flexed posture with inability to correct. Pt unsteady but no overt LOB. Pt fatigues quickly limiting gait distance and requests return to room. Pt agreeable to sitting UIC with max encouragement. Increased time spent at end of session discussing DC plans - spouse expressing concern caring for pt at home and pt is adamant she is able to do everything for herself. Pt's spouse states pt isn't very active at BL, but is not getting around her at her BL either.  Spouse would like for pt to go to SNF though pt adamantly refuses. PT educated pt about not using purewick and calling out to get up to bathroom with nsg throughout the day as well as sitting UIC. PT will continue to follow to progress mobility as tolerated. Recommending SNf however pt likely will continue to refuse, so will need HHPT at [place] with family assist.  -       Row Name 11/03/23 1538          Therapy Assessment/Plan (PT)    Therapy Frequency (PT) --  -       Row Name 11/03/23 1538          Vital Signs    O2 Delivery Pre Treatment room air  -     O2 Delivery Intra Treatment room air  -     O2 Delivery Post Treatment room air  -       Row Name 11/03/23 1538          Positioning and Restraints    Pre-Treatment Position in bed  -     Post Treatment Position chair  -     In Chair notified nsg;reclined;call light within reach;encouraged to call for assist;exit alarm on;with family/caregiver  -               User Key  (r) = Recorded By, (t) = Taken By, (c) = Cosigned By      Initials Name Provider Type     Cristine Jorge, PT Physical Therapist                   Outcome Measures       Row Name 11/03/23 1543 11/03/23 0830       How much help from another person do you currently need...    Turning from your back to your side while in flat bed without using bedrails? 3  - 2  -PS    Moving from lying on back to sitting on the side of a flat bed without bedrails? 2  - 2  -PS    Moving to and from a bed to a chair (including a wheelchair)? 3  - 2  -PS    Standing up from a chair using your arms (e.g., wheelchair, bedside chair)? 3  - 3  -PS    Climbing 3-5 steps with a railing? 2  - 2  -PS    To walk in hospital room? 3  - 2  -PS    AM-PAC 6 Clicks Score (PT) 16  - 13  -PS    Highest level of mobility 5 --> Static standing  - 4 --> Transferred to chair/commode  -PS      Row Name 11/03/23 1543 11/03/23 1516       Functional Assessment    Outcome Measure Options AM-PAC 6 Clicks Basic  Mobility (PT)  - AM-PAC 6 Clicks Daily Activity (OT)  -              User Key  (r) = Recorded By, (t) = Taken By, (c) = Cosigned By      Initials Name Provider Type    PS Keshawn Badillo, RN Registered Nurse     Cristine Jorge, PT Physical Therapist    Debbie Kitchen, OT Occupational Therapist                                 Physical Therapy Education       Title: PT OT SLP Therapies (Done)       Topic: Physical Therapy (Done)       Point: Mobility training (Done)       Learning Progress Summary             Patient Acceptance, E,TB,D, VU,NR by  at 11/3/2023 1543    Acceptance, E, VU by  at 11/1/2023 1048    Acceptance, E,D, DU,NR by  at 11/1/2023 1044   Family Acceptance, E,D, DU,NR by  at 11/1/2023 1044                         Point: Home exercise program (Done)       Learning Progress Summary             Patient Acceptance, E,TB,D, VU,NR by  at 11/3/2023 1543    Acceptance, E, VU by  at 11/1/2023 1048                         Point: Body mechanics (Done)       Learning Progress Summary             Patient Acceptance, E,TB,D, VU,NR by  at 11/3/2023 1543    Acceptance, E, VU by  at 11/1/2023 1048                         Point: Precautions (Done)       Learning Progress Summary             Patient Acceptance, E,TB,D, VU,NR by  at 11/3/2023 1543    Acceptance, E, VU by  at 11/1/2023 1048                                         User Key       Initials Effective Dates Name Provider Type Discipline    PC 06/16/21 -  Hortensia Purcell, PT Physical Therapist PT     04/08/22 -  Cristine Jorge, PT Physical Therapist PT     08/02/22 -  Valentin Ga, OT Occupational Therapist OT                  PT Recommendation and Plan     Plan of Care Reviewed With: patient, spouse  Progress: improving  Outcome Evaluation: Pt seen for PT this PM and tolerated mobility well. Pt transferred to EOB with CGA, STS with CGA, and ambulated 40ft with CGA and rwx. Pt continues with slow pace and shuffled  steps. Significant forward head and forward flexed posture with inability to correct. Pt unsteady but no overt LOB. Pt fatigues quickly limiting gait distance and requests return to room. Pt agreeable to sitting UIC with max encouragement. Increased time spent at end of session discussing DC plans - spouse expressing concern caring for pt at home and pt is adamant she is able to do everything for herself. Pt's spouse states pt isn't very active at BL, but is not getting around her at her BL either. Spouse would like for pt to go to SNF though pt adamantly refuses. PT educated pt about not using purewick and calling out to get up to bathroom with nsg throughout the day as well as sitting UIC. PT will continue to follow to progress mobility as tolerated. Recommending SNf however pt likely will continue to refuse, so will need HHPT at [place] with family assist.     Time Calculation:         PT Charges       Row Name 11/03/23 1544             Time Calculation    Start Time 1425  -      Stop Time 1448  -      Time Calculation (min) 23 min  -      PT Received On 11/03/23  -      PT - Next Appointment 11/06/23  -         Time Calculation- PT    Total Timed Code Minutes- PT 23 minute(s)  -         Timed Charges    85126 - Gait Training Minutes  10  -      93318 - PT Therapeutic Activity Minutes 13  -         Total Minutes    Timed Charges Total Minutes 23  -       Total Minutes 23  -                User Key  (r) = Recorded By, (t) = Taken By, (c) = Cosigned By      Initials Name Provider Type     Cristine Jorge, PT Physical Therapist                  Therapy Charges for Today       Code Description Service Date Service Provider Modifiers Qty    41083161875 HC GAIT TRAINING EA 15 MIN 11/3/2023 Cristine Jorge, PT GP 1    39871713117 HC PT THERAPEUTIC ACT EA 15 MIN 11/3/2023 Cristine Jorge, PT GP 1            PT G-Codes  Outcome Measure Options: AM-PAC 6 Clicks Basic Mobility (PT)  AM-PAC 6 Clicks Score  (PT): 16  AM-PAC 6 Clicks Score (OT): 17  PT Discharge Summary  Anticipated Discharge Disposition (PT): skilled nursing facility, home with assist, home with home health    Cristine Jorge, PT  11/3/2023

## 2023-11-03 NOTE — PLAN OF CARE
Goal Outcome Evaluation:  Plan of Care Reviewed With: patient        Progress: improving  Outcome Evaluation: Patient reports left sided rib pain, denied need for pain medication, vss, afebrile, saline locked, sat up in chair, falls precautions maintained, will continue to monitor.

## 2023-11-03 NOTE — PLAN OF CARE
Goal Outcome Evaluation:  Plan of Care Reviewed With: patient, spouse        Progress: improving  Outcome Evaluation: Pt seen in PM for OT, pt agreeable, reports no back pain on arrival. Demo's upright ADLs at sinkside this date, mostly CGA, however pt requiring x2 seated rest breaks at sinkside due to fatigue and weakness. use of Rwx for func mob to and from sinkside. Continue to progress as pt able to tolerate. Pending SNF vs HHOT at d/c. Feel pt could benefit from continued rehab at d/c to ensure safety once returning home.      Anticipated Discharge Disposition (OT): skilled nursing facility

## 2023-11-04 PROBLEM — R73.03 PREDIABETES: Status: ACTIVE | Noted: 2023-11-04

## 2023-11-04 PROCEDURE — 25010000002 ENOXAPARIN PER 10 MG: Performed by: STUDENT IN AN ORGANIZED HEALTH CARE EDUCATION/TRAINING PROGRAM

## 2023-11-04 RX ADMIN — AMLODIPINE BESYLATE 5 MG: 5 TABLET ORAL at 09:30

## 2023-11-04 RX ADMIN — ENOXAPARIN SODIUM 40 MG: 100 INJECTION SUBCUTANEOUS at 15:48

## 2023-11-04 RX ADMIN — PANTOPRAZOLE SODIUM 40 MG: 40 TABLET, DELAYED RELEASE ORAL at 06:56

## 2023-11-04 NOTE — DISCHARGE PLACEMENT REQUEST
"Kirti Santillan (77 y.o. Female)       Date of Birth   1945    Social Security Number       Address   [address] [place] [address]    Home Phone   [phone]    MRN   [id number]       Jewish   Evangelical    Marital Status                               Admission Date   10/31/23    Admission Type   Emergency    Admitting Provider   Hermelinda Martin MD    Attending Provider   Jozef Winters MD    Department, Room/Bed   [address], 88/1       Discharge Date       Discharge Disposition       Discharge Destination                                 Attending Provider: Jozef Winters MD    Allergies: Hydrocodone    Isolation: None   Infection: None   Code Status: CPR    Ht: 160 cm (63\")   Wt: 78.8 kg (173 lb 11.6 oz)    Admission Cmt: None   Principal Problem: Multiple falls [R29.6]                   Active Insurance as of 10/31/2023       Primary Coverage       Payor Plan Insurance Group Employer/Plan Group    MEDICARE MEDICARE A & B        Payor Plan Address Payor Plan Phone Number Payor Plan Fax Number Effective Dates    [address] [phone]  11/1/2009 - None Entered    [place] [id number]         Subscriber Name Subscriber Birth Date Member ID       KIRTI SANTILLAN 1945 [id number]                     Emergency Contacts        (Rel.) Home Phone Work Phone Mobile Phone    Kenny Santillan (Spouse) -- -- [phone]    Robinson Santillan (Son) [phone] -- --              {Outbreak/Travel/Exposure Documentation......;  Question Available Choices Patient Response   COVID-19 Outbreak Screen:  Do you currently have a new onset of the following symptoms?        Fever/Chills, Cough, Shortness of air, Loss of taste or smell, No, Unknown  No (10/31/23 2207)   COVID-19 Outbreak Screen: In the last 14 days, have you had contact with anyone who is ill, has show any of the symptoms listed above and/or has been diagnosis with the 2019 Novel " - You will be emailed results of your RVP swab     - You have also been provided with a referral for a primary care doctor to schedule follow-up with     - Return to the ED for any new, worsening, or concerning symptoms to you including difficulty breathing, chest pain, abdominal pain or >4 episodes of watery stool per day     - Take ibuprofen/tylenol as directed as needed for pain  To control your pain at home, you should take Ibuprofen 400 mg along with Tylenol 650mg-1000mg every 6 to 8 hours. Limit your maximum daily Tylenol from all sources to 4000mg. Be aware that many other medications contain acetaminophen which is also known as Tylenol. Taking Tylenol and Ibuprofen together has been shown to be more effective at relieving pain than taking them separately. These are both over the counter medications that you can  at your local pharmacy without a prescription. You need to respect all of the warnings on the bottles. You shouldn’t take these medications for more than a week without following up with your doctor. Both medications come with certain risks and side effects that you need to discuss with your doctor, especially if you are taking them for a prolonged period.    -You can add Mucinex (over-the-counter) medication as needed  -You can add lidocaine patches as needed for your back pain called Salonpas (also purchased over the counter)    - Rest and keep yourself hydrated with fluids Coronavirus? This includes any immediate household members but excludes any patients with whom you have been in contact within your normal work duties wearing proper PPE, if you are a healthcare worker.  Yes, No, Unknown              (not recorded)   COVID-19 Outbreak Screen: Who was notified? Free text (not recorded)   Ebola Screening Outbreak Screen: Have you traveled to the [place] the [place] or [place] within the past 21 days?  Yes, No, Unknown (not recorded)   Ebola Screening Outbreak Screen: Do you have ANY of the following symptoms: Fever/Chills, Vomiting, Diarrhea, Fatigue, Headache, Muscle pain, Unexplained bleeding, Abdominal (stomach) pain, No, Unknown (not recorded)   Ebola Screening Outbreak Screen: Name of Person notified Free text (not recorded)   Travel Screen: Have you traveled in the last month? If so, to what country have you traveled? If [place] what state? Yes, No, Unknown  List of all countries  List of all States No (10/31/23 1222)  (not recorded)  (not recorded)   Infection Risk: Do you currently have the following symptoms?  (If cough is selected, the Tuberculosis Screen is performed.) Cough, Fever, Rash, No (!) Cough (10/31/23 1222)   Tuberculosis Screen: Do you have any of the following Tuberculosis Risks?  Have you lived or spent time with anyone who had or may have TB?  Have you lived in or visited any of the following areas for more than one month: [place], [place], [place], [place] or [place], the [place] or [place]?  Do you have HIV/AIDS?  Have you lived in or worked in a nursing home, homeless shelter, correctional facility, or substance abuse treatment facility?   No    If Yes do you have any of the following symptoms? Yes responses display to the right    If Yes, symptoms listed are:  Cough greater than or equal to 3 weeks, Loss of appetite, Unexplained weight loss, Night sweats, Bloody sputum or hemoptysis, Hoarseness, Fever, Fatigue, Chest pain, No (not  recorded)  (not recorded)   Exposure Screen: Have you been exposed to any of these contagious diseases in the last month? Measles, Chickenpox, Meningitis, Pertussis, Whooping Cough, No No (10/31/23 1334)

## 2023-11-04 NOTE — CASE MANAGEMENT/SOCIAL WORK
Continued Stay Note  [facility]     Patient Name: Kirti Santillan  MRN: [id number]  Today's Date: 11/4/2023    Admit Date: 10/31/2023    Plan: [facility]- referral pending, no beds available over weekend   Discharge Plan       Row Name 11/04/23 1219       Plan    Plan Comments Don called CCP back with back up to [facility]. Per Ke[facility], no beds available this weekend. Abby VAUGHN RN CCP                                                Discharge Codes    No documentation.                 Expected Discharge Date and Time       Expected Discharge Date Expected Discharge Time    Nov 2, 2023               Abby Uriostegui RN

## 2023-11-04 NOTE — PLAN OF CARE
Goal Outcome Evaluation:           Progress: improving  Outcome Evaluation: Pt comfortable and slept most of night. No c/o pain or n/v during night. Patient refused pericolace. Education provided and patient still refused. No BM this shift. VSS on RA. Falls precautions maintained. Will continue to monitor.

## 2023-11-04 NOTE — CASE MANAGEMENT/SOCIAL WORK
Continued Stay Note  [facility]     Patient Name: Kirti Santillan  MRN: [id number]  Today's Date: 11/4/2023    Admit Date: 10/31/2023    Plan: [facility]- referral pending, no beds available over weekend   Discharge Plan       Row Name 11/04/23 0947       Plan    Plan [facility]- referral pending, no beds available over weekend    Patient/Family in Agreement with Plan yes    Plan Comments CCP returned call for , Don to inquire about SNF choice. [facility] CCP placed referral and spoke to [facility]. Per [facility], no beds this weekend at [facility] and cannot confirm availability until Monday, could consider at [place]. CCP spoke to  who declines [facility]. CCP encouraged Don to consider a backup choice in case [facility] does not have any beds. Abby VAUGHN RN CCP                   Discharge Codes    No documentation.                 Expected Discharge Date and Time       Expected Discharge Date Expected Discharge Time    Nov 2, 2023               Abby Uriostegui RN

## 2023-11-04 NOTE — PROGRESS NOTES
"    Name: Kirti Santillan ADMIT: 10/31/2023   : 1945  PCP: Deangelo Sprague MD    MRN: [id number] LOS: 3 days   AGE/SEX: 77 y.o. female  ROOM: [facility]     Subjective   Subjective   No acute events overnight.  No new complaints.  Reports feeling better, pain is improving.  Patient now agreeable to SNF.    Review of Systems   As above  Objective   Objective   Vital Signs  Temp:  [96.6 °F (35.9 °C)-98.2 °F (36.8 °C)] 98.2 °F (36.8 °C)  Heart Rate:  [71-75] 71  Resp:  [16-20] 18  BP: (130-151)/(70-83) 139/79  SpO2:  [92 %-94 %] 92 %  on   ;   Device (Oxygen Therapy): room air  Body mass index is 30.77 kg/m².  Physical Exam    General:, Laying in bed, not in distress,  HEENT: Normocephalic, atraumatic  CV: Regular rate and rhythm, no murmurs rubs or gallops  Lungs: Clear to auscultation bilaterally, no crackles or wheezes  Abdomen: Soft, nontender, nondistended  Extremities: Trace edema lower extremities, no cyanosis        Results Review     I reviewed the patient's new clinical results.  Results from last 7 days   Lab Units 23  0604 23  0649 10/31/23  1302   WBC 10*3/mm3 5.87 7.06 10.23   HEMOGLOBIN g/dL 14.7 14.4 15.3   PLATELETS 10*3/mm3 144 148 160     Results from last 7 days   Lab Units 23  0604 23  0649 10/31/23  1302   SODIUM mmol/L 138 137 138   POTASSIUM mmol/L 3.9 4.0 4.1   CHLORIDE mmol/L 104 101 102   CO2 mmol/L 27.2 29.0 26.7   BUN mg/dL 17 17 21   CREATININE mg/dL 0.72 0.82 0.81   GLUCOSE mg/dL 121* 112* 145*   Estimated Creatinine Clearance: 64 mL/min (by C-G formula based on SCr of 0.72 mg/dL).  Results from last 7 days   Lab Units 10/31/23  1302   ALBUMIN g/dL 4.1   BILIRUBIN mg/dL 0.6   ALK PHOS U/L 88   AST (SGOT) U/L 25   ALT (SGPT) U/L 15     Results from last 7 days   Lab Units 23  0604 23  0649 10/31/23  1302   CALCIUM mg/dL 10.0 10.2 10.5   ALBUMIN g/dL  --   --  4.1     No results found for: \"COVID19\"  Hemoglobin A1C   Date/Time Value Ref Range Status "   11/02/2023 0556 6.30 (H) 4.80 - 5.60 % Final           [facility]  This procedure was auto-finalized with no dictation required.    Scheduled Medications  amLODIPine, 5 mg, Oral, Q24H  enoxaparin, 40 mg, Subcutaneous, Q24H  fentanyl, 50 mcg, Intravenous, Once  lidocaine, 1 mL, Intradermal, Once  pantoprazole, 40 mg, Oral, Q AM  senna-docusate sodium, 2 tablet, Oral, BID    Infusions     Diet  Diet: Regular/House Diet; Texture: Regular Texture (IDDSI 7); Fluid Consistency: Thin (IDDSI 0)    I have personally reviewed     [x]  Laboratory   []  Microbiology   []  Radiology   []  EKG/Telemetry  []  Cardiology/Vascular   []  Pathology    []  Records       Assessment/Plan     Active Hospital Problems    Diagnosis  POA    **Multiple falls [R29.6]  Not Applicable    High blood pressure [I10]  Unknown    Lumbar radiculopathy [M54.16]  Unknown    Lumbar spinal stenosis [M48.061]  Unknown    Abdominal aneurysm [I71.40]  Unknown    Chronic back pain [M54.9, G89.29]  Unknown      Resolved Hospital Problems   No resolved problems to display.       77 y.o. female admitted with Multiple falls.  B12 and folate normal    Lumbar spinal stenosis/ weakness/falls  -TSH, B12 normal  CT scan of the cervical, lumbar and thoracic spine showed multilevel degenerative disc disease, and moderate to severe stenosis of lumbar region  --Follow-up MRI showed  canal stenosis L2/3 and L3/4 with scoliosis and hyperlordosis.     -Weakness, pain secondary to stenosis  -Status post epidural 11/02/2023  -Neurosurgery evaluated, plan for conservative management, may need surgery does not improve.    -PRN  Percocet for pain.   -bowel regimen  - PT OT.  -Patient now agreeable to rehab.  -Neurosurgery evaluated, will need 1 month follow-up outpatient, may need surgery if fails conservative management.        Lung nodule: CT scan showed right upper lobe pulmonary nodule.  Will need 6-month follow-up CT scan beginning of  May/2023.      High blood pressure, continue amlodipine 5 mg daily.  Blood pressure improved.      Hyperglycemia: Globin A1c 6.3, consistent with prediabetes.  Diet, lifestyle modifications recommended.    Abdominal aneurysm: CT scan showed infrarenal 4.3 cm abdominal aortic aneurysm. Vascular surgery evaluated..   6 weeks outpatient follow-up with abdominal ultrasound recommended.    DVT prophylax: Initiate Lovenox  Full code.  Discussed with patient and spouse.  Anticipate discharge SNF, once arranged.  Medically stable to be discharged.    Copied text in this note has been reviewed and is accurate as of 11/04/23.         Dictated utilizing Dragon dictation        Jozef Winters MD  [facility]  11/04/23  09:15 EDT

## 2023-11-05 PROCEDURE — 25010000002 ENOXAPARIN PER 10 MG: Performed by: STUDENT IN AN ORGANIZED HEALTH CARE EDUCATION/TRAINING PROGRAM

## 2023-11-05 RX ADMIN — ENOXAPARIN SODIUM 40 MG: 100 INJECTION SUBCUTANEOUS at 16:20

## 2023-11-05 RX ADMIN — PANTOPRAZOLE SODIUM 40 MG: 40 TABLET, DELAYED RELEASE ORAL at 05:36

## 2023-11-05 RX ADMIN — OXYCODONE HYDROCHLORIDE AND ACETAMINOPHEN 1 TABLET: 5; 325 TABLET ORAL at 03:23

## 2023-11-05 RX ADMIN — AMLODIPINE BESYLATE 5 MG: 5 TABLET ORAL at 08:54

## 2023-11-05 NOTE — PLAN OF CARE
Goal Outcome Evaluation:  Plan of Care Reviewed With: patient        Progress: improving  Outcome Evaluation: Resting between care, Norco given for pain control x1, no c/o of n/v, bed alarm in use and fall precautions maitained, saline locked, brief in place, pt able to ambulate to BR w/ assist x1 w/ walker

## 2023-11-05 NOTE — PROGRESS NOTES
"    Name: Kirti Santillan ADMIT: 10/31/2023   : 1945  PCP: Deangelo Sprague MD    MRN: [id number] LOS: 4 days   AGE/SEX: 77 y.o. female  ROOM: [facility]     Subjective   Subjective   No acute events overnight.  Feels about the same compared to yesterday, overall improved from admission, pain is controlled.    Review of Systems   As above  Objective   Objective   Vital Signs  Temp:  [97 °F (36.1 °C)-97.2 °F (36.2 °C)] 97.2 °F (36.2 °C)  Heart Rate:  [56-80] 80  Resp:  [17-18] 17  BP: (139-151)/(74-88) 150/74  SpO2:  [92 %-96 %] 93 %  on   ;   Device (Oxygen Therapy): room air  Body mass index is 30.77 kg/m².  Physical Exam    General:, Alert, laying in bed, not in distress,  HEENT: Normocephalic, atraumatic  CV: Regular rate and rhythm, no murmurs rubs or gallops  Lungs: Clear to auscultation bilaterally, no crackles or wheezes  Abdomen: Soft, nontender, nondistended  Extremities: No significant peripheral edema, no cyanosis,        Results Review     I reviewed the patient's new clinical results.  Results from last 7 days   Lab Units 23  0604 23  0649 10/31/23  1302   WBC 10*3/mm3 5.87 7.06 10.23   HEMOGLOBIN g/dL 14.7 14.4 15.3   PLATELETS 10*3/mm3 144 148 160     Results from last 7 days   Lab Units 23  0604 23  0649 10/31/23  1302   SODIUM mmol/L 138 137 138   POTASSIUM mmol/L 3.9 4.0 4.1   CHLORIDE mmol/L 104 101 102   CO2 mmol/L 27.2 29.0 26.7   BUN mg/dL 17 17 21   CREATININE mg/dL 0.72 0.82 0.81   GLUCOSE mg/dL 121* 112* 145*   Estimated Creatinine Clearance: 64 mL/min (by C-G formula based on SCr of 0.72 mg/dL).  Results from last 7 days   Lab Units 10/31/23  1302   ALBUMIN g/dL 4.1   BILIRUBIN mg/dL 0.6   ALK PHOS U/L 88   AST (SGOT) U/L 25   ALT (SGPT) U/L 15     Results from last 7 days   Lab Units 23  0604 23  0649 10/31/23  1302   CALCIUM mg/dL 10.0 10.2 10.5   ALBUMIN g/dL  --   --  4.1     No results found for: \"COVID19\"  No results found for: \"HGBA1C\", " "\"POCGLU\"          [facility]  This procedure was auto-finalized with no dictation required.    Scheduled Medications  amLODIPine, 5 mg, Oral, Q24H  enoxaparin, 40 mg, Subcutaneous, Q24H  fentanyl, 50 mcg, Intravenous, Once  lidocaine, 1 mL, Intradermal, Once  pantoprazole, 40 mg, Oral, Q AM  senna-docusate sodium, 2 tablet, Oral, BID    Infusions     Diet  Diet: Regular/House Diet; Texture: Regular Texture (IDDSI 7); Fluid Consistency: Thin (IDDSI 0)    I have personally reviewed     [x]  Laboratory   []  Microbiology   []  Radiology   []  EKG/Telemetry  []  Cardiology/Vascular   []  Pathology    []  Records       Assessment/Plan     Active Hospital Problems    Diagnosis  POA    **Multiple falls [R29.6]  Not Applicable    Prediabetes [R73.03]  Unknown    High blood pressure [I10]  Unknown    Lumbar radiculopathy [M54.16]  Unknown    Lumbar spinal stenosis [M48.061]  Unknown    Abdominal aneurysm [I71.40]  Unknown    Chronic back pain [M54.9, G89.29]  Unknown      Resolved Hospital Problems   No resolved problems to display.       77 y.o. female admitted with Multiple falls.  B12 and folate normal    Lumbar spinal stenosis/ weakness/falls  - MRI showed significant canal stenosis L2/3 and L3/4 with scoliosis and hyperlordosis.     -Status post epidural injection 11/02/2023  -PRN  Percocet for pain, bowel regimen  -Neurosurgery evaluated, will need 1 month follow-up outpatient, may need surgery if fails conservative management.  -Continue PT OT pending placement to rehab        Lung nodule: CT scan showed right upper lobe pulmonary nodule.  Will need 6-month follow-up CT scan beginning of May/2023.      High blood pressure, continue amlodipine 5 mg daily.  Blood pressure acutely stable.      Hyperglycemia: Globin A1c 6.3, consistent with prediabetes.  Diet, lifestyle modifications recommended.    Abdominal aneurysm: CT scan showed infrarenal 4.3 cm abdominal aortic aneurysm. Vascular surgery " evaluated..   6 weeks outpatient follow-up with abdominal ultrasound recommended.    DVT prophylax: Initiate Lovenox  Full code.  Discussed with patient and spouse.  Anticipate discharge SNF, once arranged.  Referrals pending.  Medically stable to be discharged.    Copied text in this note has been reviewed and is accurate as of 11/05/23.         Dictated utilizing Dragon dictation        Jozef Winters MD  [facility]  11/05/23  07:46 EST

## 2023-11-05 NOTE — PLAN OF CARE
Goal Outcome Evaluation: Pt resting in bed with no signs of distress noted at this time. VS stable. Bed in lowest position with siderails up X2. Call light within reach. RN will continue to monitor.

## 2023-11-06 PROCEDURE — 97530 THERAPEUTIC ACTIVITIES: CPT

## 2023-11-06 PROCEDURE — 97116 GAIT TRAINING THERAPY: CPT

## 2023-11-06 PROCEDURE — 25010000002 ENOXAPARIN PER 10 MG: Performed by: STUDENT IN AN ORGANIZED HEALTH CARE EDUCATION/TRAINING PROGRAM

## 2023-11-06 RX ADMIN — PANTOPRAZOLE SODIUM 40 MG: 40 TABLET, DELAYED RELEASE ORAL at 06:33

## 2023-11-06 RX ADMIN — AMLODIPINE BESYLATE 5 MG: 5 TABLET ORAL at 10:43

## 2023-11-06 RX ADMIN — ENOXAPARIN SODIUM 40 MG: 100 INJECTION SUBCUTANEOUS at 18:19

## 2023-11-06 NOTE — PLAN OF CARE
Goal Outcome Evaluation:  Plan of Care Reviewed With: patient        Progress: improving  Outcome Evaluation: Pt seen for PT tx this PM and tolerated the session well. Pt performed bed mobility with CG/Carly, STS w/ CGA to RW and ambulated 40' with RW and CGA. Pt demos slow, short-shuffled gait w/ flexed fwd posture requiring cueing for upright posture and larger stride lengths. Pt was able to increase stride length for 4-6' at a time before returning to short-shuffled steps and moments of festinating. Pt performed LE ther-ex once return to sitting EOB as she politely declined sitting UIC. The session ended w/ pt supine w/ HOB elevated and all needs met. PT will cont to follow to progress her mobility as tolerated.      Anticipated Discharge Disposition (PT): skilled nursing facility

## 2023-11-06 NOTE — THERAPY TREATMENT NOTE
Patient Name: Kirti Santillan  : 1945    MRN: [id number]                              Today's Date: 2023       Admit Date: 10/31/2023    Visit Dx:     ICD-10-CM ICD-9-CM   1. Bilateral leg weakness  R29.898 729.89   2. Lung nodule  R91.1 793.11   3. Abdominal aortic aneurysm (AAA) without rupture, unspecified part  I71.40 441.4   4. Multiple falls  R29.6 V15.88   5. Lumbar radiculopathy  M54.16 724.4     Patient Active Problem List   Diagnosis    Multiple falls    Abdominal aneurysm    Chronic back pain    Lumbar radiculopathy    Lumbar spinal stenosis    High blood pressure    Prediabetes     Past Medical History:   Diagnosis Date    High blood pressure 11/3/2023    Low back pain     Lumbar radiculopathy 2023    Sciatica of left side      Past Surgical History:   Procedure Laterality Date    EPIDURAL BLOCK      EYE SURGERY        General Information       Row Name 23 155          Physical Therapy Time and Intention    Document Type therapy note (daily note)  -MG     Mode of Treatment individual therapy;physical therapy  -MG       Row Name 23 155          General Information    Patient Profile Reviewed yes  -MG     Existing Precautions/Restrictions fall  -MG     Barriers to Rehab medically complex  -MG       Row Name 23 155          Cognition    Orientation Status (Cognition) oriented x 3  -MG       Row Name 23 155          Safety Issues, Functional Mobility    Impairments Affecting Function (Mobility) balance;endurance/activity tolerance;strength  -MG     Comment, Safety Issues/Impairments (Mobility) Gait belt and non-skid socks donned.  -MG               User Key  (r) = Recorded By, (t) = Taken By, (c) = Cosigned By      Initials Name Provider Type    MG Estrellita Pichardo, PT Physical Therapist                   Mobility       Row Name 23 1555          Bed Mobility    Supine-Sit [place] (Bed Mobility) contact guard;verbal cues  -MG     Sit-Supine [place]  (Bed Mobility) minimum assist (75% patient effort);verbal cues;nonverbal cues (demo/gesture)  -MG     Assistive Device (Bed Mobility) bed rails;head of bed elevated  -MG     Comment, (Bed Mobility) Required assist at LEs to return to supine.  -MG       Row Name 11/06/23 1553          Sit-Stand Transfer    Sit-Stand [place] (Transfers) contact guard;verbal cues  -MG     Assistive Device (Sit-Stand Transfers) walker, front-wheeled  -MG       Row Name 11/06/23 1553          Gait/Stairs (Locomotion)    [place] Level (Gait) contact guard;verbal cues  -MG     Assistive Device (Gait) walker, front-wheeled  -MG     Distance in Feet (Gait) 40  -MG     Deviations/Abnormal Patterns (Gait) base of support, narrow;davon decreased;festinating/shuffling;stride length decreased;gait speed decreased  -MG     Bilateral Gait Deviations forward flexed posture;heel strike decreased  -MG     Comment, (Gait/Stairs) Pt ambulating w/ RW demoing slow, short-shuffled steps and festinating. Required intermittent cueing for larger stride lengths that she was able to follow/perform for 4-6' at a time. No knee buckling, dizziness or LOB.  -MG               User Key  (r) = Recorded By, (t) = Taken By, (c) = Cosigned By      Initials Name Provider Type    Estrellita Pinedo PT Physical Therapist                   Obj/Interventions       Row Name 11/06/23 1554          Motor Skills    Therapeutic Exercise other (see comments)  AP and HF x15, LAQ x10  -MG       Row Name 11/06/23 1554          Balance    Comment, Balance No overt LOB or knee buckling.  -MG               User Key  (r) = Recorded By, (t) = Taken By, (c) = Cosigned By      Initials Name Provider Type    Estrellita Pinedo PT Physical Therapist                   Goals/Plan    No documentation.                  Clinical Impression       Row Name 11/06/23 1555          Pain    Pretreatment Pain Rating 5/10  -MG     Posttreatment Pain Rating 5/10  -MG     Pain Location -  Side/Orientation Left  -MG     Pain Location lower  -MG     Pain Location - extremity  -MG     Pre/Posttreatment Pain Comment L anterior thigh.  -MG     Pain Intervention(s) Medication (See MAR);Ambulation/increased activity;Repositioned;Rest  -MG       Row Name 11/06/23 1552          Plan of Care Review    Plan of Care Reviewed With patient  -MG     Progress improving  -MG     Outcome Evaluation Pt seen for PT tx this PM and tolerated the session well. Pt performed bed mobility with CG/Carly, STS w/ CGA to RW and ambulated 40' with RW and CGA. Pt demos slow, short-shuffled gait w/ flexed fwd posture requiring cueing for upright posture and larger stride lengths. Pt was able to increase stride length for 4-6' at a time before returning to short-shuffled steps and moments of festinating. Pt performed LE ther-ex once return to sitting EOB as she politely declined sitting UIC. The session ended w/ pt supine w/ HOB elevated and all needs met. PT will cont to follow to progress her mobility as tolerated.  -MG       Row Name 11/06/23 1552          Therapy Assessment/Plan (PT)    Rehab Potential (PT) fair, will monitor progress closely  -MG     Criteria for Skilled Interventions Met (PT) yes;meets criteria  -MG     Therapy Frequency (PT) 5 times/wk  -MG       Row Name 11/06/23 1557          Vital Signs    O2 Delivery Pre Treatment room air  -MG     O2 Delivery Intra Treatment room air  -MG     O2 Delivery Post Treatment room air  -MG     Pre Patient Position Supine  -MG     Intra Patient Position Standing  -MG     Post Patient Position Supine  -MG       Row Name 11/06/23 1554          Positioning and Restraints    Pre-Treatment Position in bed  -MG     Post Treatment Position bed  -MG     In Bed notified nsg;supine;fowlers;call light within reach;exit alarm on;encouraged to call for assist;side rails up x3;LUE elevated;RUE elevated  -MG               User Key  (r) = Recorded By, (t) = Taken By, (c) = Cosigned By       Initials Name Provider Type    Estrellita Pinedo, PT Physical Therapist                   Outcome Measures       Row Name 11/06/23 1628          How much help from another person do you currently need...    Turning from your back to your side while in flat bed without using bedrails? 3  -MG     Moving from lying on back to sitting on the side of a flat bed without bedrails? 3  -MG     Moving to and from a bed to a chair (including a wheelchair)? 3  -MG     Standing up from a chair using your arms (e.g., wheelchair, bedside chair)? 3  -MG     Climbing 3-5 steps with a railing? 2  -MG     To walk in hospital room? 3  -MG     AM-PAC 6 Clicks Score (PT) 17  -MG     Highest level of mobility 5 --> Static standing  -MG               User Key  (r) = Recorded By, (t) = Taken By, (c) = Cosigned By      Initials Name Provider Type    Estrellita Pinedo, PT Physical Therapist                                 Physical Therapy Education       Title: PT OT SLP Therapies (Done)       Topic: Physical Therapy (Done)       Point: Mobility training (Done)       Learning Progress Summary             Patient Acceptance, D, VU,NR by MG at 11/6/2023 1629    Acceptance, E,TB, VU by JS at 11/6/2023 0343    Acceptance, E,TB,D, VU,NR by  at 11/3/2023 1543    Acceptance, E, VU by KN at 11/1/2023 1048    Acceptance, E,D, DU,NR by PC at 11/1/2023 1044   Family Acceptance, E,D, DU,NR by PC at 11/1/2023 1044                         Point: Home exercise program (Done)       Learning Progress Summary             Patient Acceptance, E,TB, VU by JS at 11/6/2023 0343    Acceptance, E,TB,D, VU,NR by  at 11/3/2023 1543    Acceptance, E, VU by KN at 11/1/2023 1048                         Point: Body mechanics (Done)       Learning Progress Summary             Patient Acceptance, D, VU,NR by MG at 11/6/2023 1629    Acceptance, E,TB, VU by JS at 11/6/2023 0343    Acceptance, E,TB,D, VU,NR by  at 11/3/2023 1543    Acceptance, E, VU by KN at 11/1/2023  1048                         Point: Precautions (Done)       Learning Progress Summary             Patient Acceptance, D, VU,NR by  at 11/6/2023 1629    Acceptance, E,TB, VU by  at 11/6/2023 0343    Acceptance, E,TB,D, VU,NR by  at 11/3/2023 1543    Acceptance, E, VU by  at 11/1/2023 1048                                         User Key       Initials Effective Dates Name Provider Type Discipline    PC 06/16/21 -  Hortensia Purcell PT Physical Therapist PT     05/24/22 -  Estrellita Pichardo, PT Physical Therapist PT     10/01/20 -  Sara Calle, RN Registered Nurse Nurse     04/08/22 -  Cristine Jorge, PT Physical Therapist PT     08/02/22 -  Valentin Ga OT Occupational Therapist OT                  PT Recommendation and Plan     Plan of Care Reviewed With: patient  Progress: improving  Outcome Evaluation: Pt seen for PT tx this PM and tolerated the session well. Pt performed bed mobility with CG/Carly, STS w/ CGA to RW and ambulated 40' with RW and CGA. Pt demos slow, short-shuffled gait w/ flexed fwd posture requiring cueing for upright posture and larger stride lengths. Pt was able to increase stride length for 4-6' at a time before returning to short-shuffled steps and moments of festinating. Pt performed LE ther-ex once return to sitting EOB as she politely declined sitting UIC. The session ended w/ pt supine w/ HOB elevated and all needs met. PT will cont to follow to progress her mobility as tolerated.     Time Calculation:         PT Charges       Row Name 11/06/23 1629             Time Calculation    Start Time 1458  -MG      Stop Time 1522  -MG      Time Calculation (min) 24 min  -MG      PT Received On 11/06/23  -MG      PT - Next Appointment 11/07/23  -MG                User Key  (r) = Recorded By, (t) = Taken By, (c) = Cosigned By      Initials Name Provider Type     Estrellita Pichardo, PT Physical Therapist                  Therapy Charges for Today       Code Description Service Date  Service Provider Modifiers Qty    11472071162 HC GAIT TRAINING EA 15 MIN 11/6/2023 Estrellita Pichardo, PT GP 1    30979740055 HC PT THERAPEUTIC ACT EA 15 MIN 11/6/2023 Estrellita Pichardo, PT GP 1            PT G-Codes  Outcome Measure Options: AM-PAC 6 Clicks Basic Mobility (PT)  AM-PAC 6 Clicks Score (PT): 17  AM-PAC 6 Clicks Score (OT): 17  PT Discharge Summary  Anticipated Discharge Disposition (PT): skilled nursing facility    Estrellita Pichardo, PT  11/6/2023

## 2023-11-06 NOTE — PROGRESS NOTES
Continued Stay Note  [facility]     Patient Name: Kirti Santillan  MRN: [id number]  Today's Date: 11/6/2023    Admit Date: 10/31/2023    Plan: SNF (Referrals in [facility]/Pending)   Discharge Plan       Row Name 11/06/23 1144       Plan    Plan SNF (Referrals in [facility]/Pending)    Plan Comments Msg sent to Lesvia with [facility] and Kathia with [facility] on referrals in [facility]                   Discharge Codes    No documentation.                 Expected Discharge Date and Time       Expected Discharge Date Expected Discharge Time    Nov 7, 2023               Clare Cortes RN

## 2023-11-06 NOTE — PLAN OF CARE
Goal Outcome Evaluation:  Plan of Care Reviewed With: patient           Outcome Evaluation: VSS, no c/o pain, no nausea, up with asst, on bed alarm with falls precautions, up with asst and walker, voiding freely, confused at times, saline locked, education given for stool softner not givem, patient still decined. continuing to monitor.

## 2023-11-06 NOTE — PROGRESS NOTES
"    Name: Kirti Santillan ADMIT: 10/31/2023   : 1945  PCP: Deangelo Sprague MD    MRN: [id number] LOS: 5 days   AGE/SEX: 77 y.o. female  ROOM: [facility]     Subjective   Subjective   No acute events overnight.  Patient states that she is actually not having any pain this morning and is feeling well.  Tolerating good oral intake.    Objective   Objective   Vital Signs  Temp:  [97.5 °F (36.4 °C)-98.6 °F (37 °C)] 97.5 °F (36.4 °C)  Heart Rate:  [66-79] 66  Resp:  [16] 16  BP: (128-157)/(76-87) 128/76  SpO2:  [91 %-93 %] 93 %  on   ;   Device (Oxygen Therapy): room air  Body mass index is 30.77 kg/m².  Physical Exam    General:, Alert, laying in bed, not in distress, patient very pleasant and conversive this morning  HEENT: Normocephalic, atraumatic  CV: Regular rate and rhythm, no murmurs rubs or gallops  Lungs: Clear to auscultation bilaterally, no crackles or wheezes  Abdomen: Soft, nontender, nondistended  Extremities: No significant peripheral edema, no cyanosis,    Results Review     I reviewed the patient's new clinical results.  Results from last 7 days   Lab Units 23  0604 23  0649 10/31/23  1302   WBC 10*3/mm3 5.87 7.06 10.23   HEMOGLOBIN g/dL 14.7 14.4 15.3   PLATELETS 10*3/mm3 144 148 160     Results from last 7 days   Lab Units 23  0604 23  0649 10/31/23  1302   SODIUM mmol/L 138 137 138   POTASSIUM mmol/L 3.9 4.0 4.1   CHLORIDE mmol/L 104 101 102   CO2 mmol/L 27.2 29.0 26.7   BUN mg/dL 17 17 21   CREATININE mg/dL 0.72 0.82 0.81   GLUCOSE mg/dL 121* 112* 145*   Estimated Creatinine Clearance: 64 mL/min (by C-G formula based on SCr of 0.72 mg/dL).  Results from last 7 days   Lab Units 10/31/23  1302   ALBUMIN g/dL 4.1   BILIRUBIN mg/dL 0.6   ALK PHOS U/L 88   AST (SGOT) U/L 25   ALT (SGPT) U/L 15     Results from last 7 days   Lab Units 23  0604 23  0649 10/31/23  1302   CALCIUM mg/dL 10.0 10.2 10.5   ALBUMIN g/dL  --   --  4.1     No results found for: \"COVID19\"  No " "results found for: \"HGBA1C\", \"POCGLU\"    [facility]  This procedure was auto-finalized with no dictation required.    Scheduled Medications  amLODIPine, 5 mg, Oral, Q24H  enoxaparin, 40 mg, Subcutaneous, Q24H  fentanyl, 50 mcg, Intravenous, Once  lidocaine, 1 mL, Intradermal, Once  pantoprazole, 40 mg, Oral, Q AM  senna-docusate sodium, 2 tablet, Oral, BID    Infusions     Diet  Diet: Regular/House Diet; Texture: Regular Texture (IDDSI 7); Fluid Consistency: Thin (IDDSI 0)       Assessment/Plan     Active Hospital Problems    Diagnosis  POA    **Multiple falls [R29.6]  Not Applicable    Prediabetes [R73.03]  Unknown    High blood pressure [I10]  Unknown    Lumbar radiculopathy [M54.16]  Unknown    Lumbar spinal stenosis [M48.061]  Unknown    Abdominal aneurysm [I71.40]  Unknown    Chronic back pain [M54.9, G89.29]  Unknown      Resolved Hospital Problems   No resolved problems to display.     77 y.o. female admitted with Multiple falls.    Lumbar spinal stenosis/ weakness/falls  -MRI showed significant canal stenosis L2/3 and L3/4 with scoliosis and hyperlordosis.     -Status post epidural injection 11/02/2023  -PRN  Percocet for pain, bowel regimen  -Neurosurgery evaluated, will need 1 month follow-up outpatient, may need surgery if fails conservative management.  -Continue PT/OT pending placement to rehab    Lung nodule: CT scan showed right upper lobe pulmonary nodule.  Will need 6-month follow-up CT scan beginning of May 2024.    High blood pressure: continue amlodipine 5 mg daily.  Blood pressure acutely stable.    Hyperglycemia: Hemoglobin A1c 6.3, consistent with prediabetes.  Diet, lifestyle modifications recommended.     Abdominal aneurysm: CT scan showed infrarenal 4.3 cm abdominal aortic aneurysm. Vascular surgery evaluated, 6 weeks outpatient follow-up with abdominal ultrasound recommended.    DVT prophylax: Initiate Lovenox  Full code.  Discussed with patient and spouse.  Anticipate " discharge to SNF tomorrow, transportation being arranged.    Génesis Linda MD  [facility]  11/06/23  14:07 EST

## 2023-11-06 NOTE — PROGRESS NOTES
Continued Stay Note  [facility]     Patient Name: Kirti Santillan  MRN: [id number]  Today's Date: 11/6/2023    Admit Date: 10/31/2023    [facility]   Discharge Plan       Row Name 11/06/23 1356       Plan    Plan [facility] (bed available tomorrow)    Plan Comments Per Lesvia with [facility], bed will be available tomorrow afternoon. Spoke to patient and spouse at bedside who are in agreement. Informed RN and MD      Row Name 11/06/23 1144       Plan    Plan SNF (Referrals in [facility]/Pending)    Plan Comments Msg sent to Lesvia with [facility] and Kathia with [facility] on referrals in [facility]                   Discharge Codes    No documentation.                 Expected Discharge Date and Time       Expected Discharge Date Expected Discharge Time    Nov 7, 2023               Clare Cortes, RN

## 2023-11-07 ENCOUNTER — APPOINTMENT (OUTPATIENT)
Dept: CARDIOLOGY | Facility: HOSPITAL | Age: 78
End: 2023-11-07
Payer: MEDICARE

## 2023-11-07 VITALS
HEART RATE: 65 BPM | RESPIRATION RATE: 16 BRPM | DIASTOLIC BLOOD PRESSURE: 76 MMHG | BODY MASS INDEX: 30.78 KG/M2 | OXYGEN SATURATION: 94 % | HEIGHT: 63 IN | TEMPERATURE: 98.3 F | SYSTOLIC BLOOD PRESSURE: 129 MMHG | WEIGHT: 173.72 LBS

## 2023-11-07 LAB
ANION GAP SERPL CALCULATED.3IONS-SCNC: 5.7 MMOL/L (ref 5–15)
BH CV LOWER VASCULAR LEFT COMMON FEMORAL AUGMENT: NORMAL
BH CV LOWER VASCULAR LEFT COMMON FEMORAL COMPETENT: NORMAL
BH CV LOWER VASCULAR LEFT COMMON FEMORAL COMPRESS: NORMAL
BH CV LOWER VASCULAR LEFT COMMON FEMORAL PHASIC: NORMAL
BH CV LOWER VASCULAR LEFT COMMON FEMORAL SPONT: NORMAL
BH CV LOWER VASCULAR LEFT DISTAL FEMORAL COMPRESS: NORMAL
BH CV LOWER VASCULAR LEFT GASTRONEMIUS COMPRESS: NORMAL
BH CV LOWER VASCULAR LEFT GREATER SAPH AK COMPRESS: NORMAL
BH CV LOWER VASCULAR LEFT GREATER SAPH BK COMPRESS: NORMAL
BH CV LOWER VASCULAR LEFT LESSER SAPH COMPRESS: NORMAL
BH CV LOWER VASCULAR LEFT MID FEMORAL AUGMENT: NORMAL
BH CV LOWER VASCULAR LEFT MID FEMORAL COMPETENT: NORMAL
BH CV LOWER VASCULAR LEFT MID FEMORAL COMPRESS: NORMAL
BH CV LOWER VASCULAR LEFT MID FEMORAL PHASIC: NORMAL
BH CV LOWER VASCULAR LEFT MID FEMORAL SPONT: NORMAL
BH CV LOWER VASCULAR LEFT PERONEAL COMPRESS: NORMAL
BH CV LOWER VASCULAR LEFT POPLITEAL AUGMENT: NORMAL
BH CV LOWER VASCULAR LEFT POPLITEAL COMPETENT: NORMAL
BH CV LOWER VASCULAR LEFT POPLITEAL COMPRESS: NORMAL
BH CV LOWER VASCULAR LEFT POPLITEAL PHASIC: NORMAL
BH CV LOWER VASCULAR LEFT POPLITEAL SPONT: NORMAL
BH CV LOWER VASCULAR LEFT POSTERIOR TIBIAL COMPRESS: NORMAL
BH CV LOWER VASCULAR LEFT PROFUNDA FEMORAL COMPRESS: NORMAL
BH CV LOWER VASCULAR LEFT PROXIMAL FEMORAL COMPRESS: NORMAL
BH CV LOWER VASCULAR LEFT SAPHENOFEMORAL JUNCTION COMPRESS: NORMAL
BH CV LOWER VASCULAR RIGHT COMMON FEMORAL AUGMENT: NORMAL
BH CV LOWER VASCULAR RIGHT COMMON FEMORAL COMPETENT: NORMAL
BH CV LOWER VASCULAR RIGHT COMMON FEMORAL COMPRESS: NORMAL
BH CV LOWER VASCULAR RIGHT COMMON FEMORAL PHASIC: NORMAL
BH CV LOWER VASCULAR RIGHT COMMON FEMORAL SPONT: NORMAL
BH CV LOWER VASCULAR RIGHT DISTAL FEMORAL COMPRESS: NORMAL
BH CV LOWER VASCULAR RIGHT GASTRONEMIUS COMPRESS: NORMAL
BH CV LOWER VASCULAR RIGHT GREATER SAPH AK COMPRESS: NORMAL
BH CV LOWER VASCULAR RIGHT GREATER SAPH BK COMPRESS: NORMAL
BH CV LOWER VASCULAR RIGHT LESSER SAPH COMPRESS: NORMAL
BH CV LOWER VASCULAR RIGHT MID FEMORAL AUGMENT: NORMAL
BH CV LOWER VASCULAR RIGHT MID FEMORAL COMPETENT: NORMAL
BH CV LOWER VASCULAR RIGHT MID FEMORAL COMPRESS: NORMAL
BH CV LOWER VASCULAR RIGHT MID FEMORAL PHASIC: NORMAL
BH CV LOWER VASCULAR RIGHT MID FEMORAL SPONT: NORMAL
BH CV LOWER VASCULAR RIGHT PERONEAL COMPRESS: NORMAL
BH CV LOWER VASCULAR RIGHT POPLITEAL AUGMENT: NORMAL
BH CV LOWER VASCULAR RIGHT POPLITEAL COMPETENT: NORMAL
BH CV LOWER VASCULAR RIGHT POPLITEAL COMPRESS: NORMAL
BH CV LOWER VASCULAR RIGHT POPLITEAL PHASIC: NORMAL
BH CV LOWER VASCULAR RIGHT POPLITEAL SPONT: NORMAL
BH CV LOWER VASCULAR RIGHT POSTERIOR TIBIAL COMPRESS: NORMAL
BH CV LOWER VASCULAR RIGHT PROFUNDA FEMORAL COMPRESS: NORMAL
BH CV LOWER VASCULAR RIGHT PROXIMAL FEMORAL COMPRESS: NORMAL
BH CV LOWER VASCULAR RIGHT SAPHENOFEMORAL JUNCTION COMPRESS: NORMAL
BH CV VAS PRELIMINARY FINDINGS SCRIPTING: 1
BUN SERPL-MCNC: 17 MG/DL (ref 8–23)
BUN/CREAT SERPL: 25.4 (ref 7–25)
CALCIUM SPEC-SCNC: 10.5 MG/DL (ref 8.6–10.5)
CHLORIDE SERPL-SCNC: 103 MMOL/L (ref 98–107)
CO2 SERPL-SCNC: 28.3 MMOL/L (ref 22–29)
CREAT SERPL-MCNC: 0.67 MG/DL (ref 0.57–1)
DEPRECATED RDW RBC AUTO: 44.2 FL (ref 37–54)
EGFRCR SERPLBLD CKD-EPI 2021: 89.6 ML/MIN/1.73
ERYTHROCYTE [DISTWIDTH] IN BLOOD BY AUTOMATED COUNT: 13.4 % (ref 12.3–15.4)
GLUCOSE SERPL-MCNC: 126 MG/DL (ref 65–99)
HCT VFR BLD AUTO: 42.8 % (ref 34–46.6)
HGB BLD-MCNC: 14.4 G/DL (ref 12–15.9)
MCH RBC QN AUTO: 30.1 PG (ref 26.6–33)
MCHC RBC AUTO-ENTMCNC: 33.6 G/DL (ref 31.5–35.7)
MCV RBC AUTO: 89.5 FL (ref 79–97)
PLATELET # BLD AUTO: 168 10*3/MM3 (ref 140–450)
PMV BLD AUTO: 11.5 FL (ref 6–12)
POTASSIUM SERPL-SCNC: 4.1 MMOL/L (ref 3.5–5.2)
RBC # BLD AUTO: 4.78 10*6/MM3 (ref 3.77–5.28)
SODIUM SERPL-SCNC: 137 MMOL/L (ref 136–145)
WBC NRBC COR # BLD: 6.77 10*3/MM3 (ref 3.4–10.8)

## 2023-11-07 PROCEDURE — 80048 BASIC METABOLIC PNL TOTAL CA: CPT | Performed by: STUDENT IN AN ORGANIZED HEALTH CARE EDUCATION/TRAINING PROGRAM

## 2023-11-07 PROCEDURE — 93970 EXTREMITY STUDY: CPT

## 2023-11-07 PROCEDURE — 25010000002 ENOXAPARIN PER 10 MG: Performed by: STUDENT IN AN ORGANIZED HEALTH CARE EDUCATION/TRAINING PROGRAM

## 2023-11-07 PROCEDURE — 85027 COMPLETE CBC AUTOMATED: CPT | Performed by: STUDENT IN AN ORGANIZED HEALTH CARE EDUCATION/TRAINING PROGRAM

## 2023-11-07 RX ORDER — AMLODIPINE BESYLATE 5 MG/1
5 TABLET ORAL
Qty: 30 TABLET | Refills: 0 | Status: SHIPPED | OUTPATIENT
Start: 2023-11-08

## 2023-11-07 RX ADMIN — ENOXAPARIN SODIUM 40 MG: 100 INJECTION SUBCUTANEOUS at 15:25

## 2023-11-07 RX ADMIN — AMLODIPINE BESYLATE 5 MG: 5 TABLET ORAL at 09:52

## 2023-11-07 RX ADMIN — OXYCODONE HYDROCHLORIDE AND ACETAMINOPHEN 1 TABLET: 5; 325 TABLET ORAL at 09:57

## 2023-11-07 RX ADMIN — PANTOPRAZOLE SODIUM 40 MG: 40 TABLET, DELAYED RELEASE ORAL at 05:27

## 2023-11-07 NOTE — PROGRESS NOTES
Continued Stay Note  [facility]     Patient Name: Kirti Santillan  MRN: [id number]  Today's Date: 11/7/2023    Admit Date: 10/31/2023    Plan: [facility]   Discharge Plan       Row Name 11/07/23 0856       [facility]    Plan Comments Msg sent to [facility] with [facility] to inform of DC order is in [facility]. [facility] will contact [facility] once bed becomes available                   Discharge Codes    No documentation.                 Expected Discharge Date and Time       Expected Discharge Date Expected Discharge Time    Nov 7, 2023               Clare Cortes RN

## 2023-11-07 NOTE — PROGRESS NOTES
Continued Stay Note  [facility]     Patient Name: Kirti Santillan  MRN: [id number]  Today's Date: 11/7/2023    Admit Date: 10/31/2023    Plan: [facility]   Discharge Plan       Row Name 11/07/23 1024       [facility]    Plan Comments Per [facility], bed will be available after 3pm. Updated RN, patient and spouse at bedside.      Row Name 11/07/23 0856       [facility]    Plan Comments Msg sent to [facility] with [facility] to inform of DC order is in [facility]. [facility] will contact [facility] once bed becomes available                   Discharge Codes    No documentation.                 Expected Discharge Date and Time       Expected Discharge Date Expected Discharge Time    Nov 7, 2023               Clare Cortes RN

## 2023-11-07 NOTE — PROGRESS NOTES
Continued Stay Note  [facility]     Patient Name: Kirti Santillan  MRN: [id number]  Today's Date: 11/7/2023    Admit Date: 10/31/2023    [facility]   Discharge Plan       Row Name 11/07/23 1329       Plan    Plan [facility]    Plan Comments Discharge summary faxed. Packet given to RN. Spouse to transport.                   Discharge Codes    No documentation.                 Expected Discharge Date and Time       Expected Discharge Date Expected Discharge Time    Nov 7, 2023               Clare Cortes RN

## 2023-11-07 NOTE — PLAN OF CARE
Goal Outcome Evaluation:  Plan of Care Reviewed With: patient           Outcome Evaluation: VSS, slept majority oif shift, no c/o pain or nausea, purewick in place, turns and repositioning provided, tolerating diet, worked with PT/OT on day shift, awaiting placement, will continue with plan of care.

## 2023-11-07 NOTE — DISCHARGE SUMMARY
Patient Name: Kirti Santillan  : 1945  MRN: [id number]    Date of Admission: 10/31/2023  Date of Discharge:  2023  Primary Care Physician: Deangelo Sprague MD      Chief Complaint:   Back Pain, Fall, and Head Injury      Discharge Diagnoses     Active Hospital Problems    Diagnosis  POA    **Multiple falls [R29.6]  Not Applicable    Prediabetes [R73.03]  Unknown    High blood pressure [I10]  Unknown    Lumbar radiculopathy [M54.16]  Unknown    Lumbar spinal stenosis [M48.061]  Unknown    Abdominal aneurysm [I71.40]  Unknown    Chronic back pain [M54.9, G89.29]  Unknown      Resolved Hospital Problems   No resolved problems to display.        Hospital Course     Ms. Santillan is a 77 y.o. female with a history of sciatica who presented to [facility] initially complaining of multiple falls and back/leg pain.  Please see the admitting history and physical for further details.  She was found to have severe lumbar stenosis and was admitted to the hospital for further evaluation and treatment.      Lumbar spine was obtained on admission.  It revealed severe canal stenosis at L2/3 and L3/4 with scoliosis and hyperlordosis. It was discussed with the patient that she may ultimately need surgical management, though she stated she would not be interested in surgical intervention.  Decision was ultimately made to perform an epidural injection to see if this provided pain relief.  On 2023.  The patient did have fairly significant relief of her pain after this procedure.  She should follow-up with neurosurgery as an outpatient within 1 month of hospital discharge to discuss further epidural injections versus surgical intervention.  After her numerous falls, the patient did complain of left lower extremity pain on admission.  XRs were obtained and normal.  On day of discharge, since pain persisted, a ultrasound was ordered of her lower extremity to ensure she did not have a DVT.  This was normal.   After working with physical therapy, it was felt the patient could benefit from rehab prior to discharge home.  Case management was consulted.  The patient was discharged to SNF in good condition.    Patient incidentally had a finding of infrarenal abdominal aortic aneurysm.  This was confirmed on CT angiogram of the abdomen/pelvis.  This was a new diagnosis for the patient.  Size of the aneurysm was 4.3 cm.  Vascular surgery was consulted for evaluation.  Consideration for surgical repair is typically not considered until it is greater than or equal to 5 cm.  Vascular surgery plans to see the patient in their office in 6 weeks for an abdominal aortic ultrasound, and they will continue discussions at that time.  Patient also was found to have a right upper lobe pulmonary nodule incidentally on imaging.  She will need a follow-up CT scan within 6 months to determine resolution/stability.  This can be ordered by her PCP.  Patient also found to have an elevated hemoglobin A1c at 6.3 consistent with prediabetes.  Recommend diet and lifestyle changes at discharge.  The patient should continue to follow closely with her PCP for this.    Day of Discharge     Subjective:  No acute events overnight.  Patient states that she is feeling well today.  Continues to have some pain in her left leg, but otherwise is feeling well.    Physical Exam:  Temp:  [97.9 °F (36.6 °C)-98.3 °F (36.8 °C)] 98.2 °F (36.8 °C)  Heart Rate:  [68-75] 68  Resp:  [16-18] 16  BP: (126-137)/(75-86) 136/79  Body mass index is 30.77 kg/m².  Physical Exam  General:, Alert, laying in bed, not in distress, patient very pleasant and conversive this morning  HEENT: Normocephalic, atraumatic  CV: Regular rate and rhythm, no murmurs rubs or gallops  Lungs: Clear to auscultation bilaterally, no crackles or wheezes  Abdomen: Soft, nontender, nondistended  Extremities: No significant peripheral edema, no cyanosis,    Consultants     Consult Orders (all) (From  admission, onward)       Start     Ordered    11/02/23 0935  Inpatient Anesthesia Pain Management Consult  Once        Specialty:  Pain Medicine  Provider:  (Not yet assigned)    11/02/23 0934    11/01/23 1236  Inpatient Neurosurgery Consult  Once        Specialty:  Neurosurgery  Provider:  Steve Conti MD    11/01/23 1236    11/01/23 0819  Inpatient Vascular Surgery Consult  Once        Specialty:  Vascular Surgery  Provider:  Sherman Wilson MD    11/01/23 0819    10/31/23 1505  [facility] (on-call MD unless specified) Details  Once        Specialty:  Hospitalist  Provider:  Hermelinda Martin MD    10/31/23 1504                  Procedures     * Surgery not found *    Imaging Results (All)       Procedure Component Value Units Date/Time    FL Guided Pain Management Spine [[id number]] Resulted: 11/02/23 1129     Updated: 11/02/23 1129    Narrative:      This procedure was auto-finalized with no dictation required.    MRI Lumbar Spine Without Contrast [[id number]] Collected: 11/01/23 1942     Updated: 11/01/23 1954    Narrative:      MRI OF THE LUMBAR SPINE WITHOUT CONTRAST     CLINICAL HISTORY: Back pain and leg pain.     TECHNIQUE: MRI of the lumbar spine was obtained with sagittal T1,  proton-density, and T2-weighted images. Additionally, there are axial T1  and T2-weighted images through the lumbar spine.     COMPARISON: No previous MRIs of the lumbar spine are available for  comparison.     FINDINGS:     There is transitional anatomy at the level of the lumbosacral junction  with partial sacralization of the L5 vertebral body. Please take careful  note of this enumeration system at the time of any potential  intervention     The conus medullaris terminates at the level of the lower body of L2 and  has normal signal intensity. The visualized distal thoracic spinal cord  is unremarkable in appearance.     There is mild dextroconvex scoliotic curvature of the lumbar spine with  its apex centered at  L3.     At T11-12, there is a small right central disc protrusion minimally  indenting the ventral subarachnoid space. There is mild right foraminal  narrowing secondary to bulging disc material and loss of foraminal  height.     At T12-L1, there are advanced degenerative disc changes seen along the  right side of the disc space with adjacent degenerative endplate marrow  edema. There is mild left and moderate right foraminal narrowing  secondary to loss of foraminal height and bulging disc material. There  is mild central canal stenosis secondary to bulging disc material and  facet arthropathy.     At L1-2, there is mild central canal stenosis secondary to bulging disc  material eccentric to the left in addition to ligamentum flavum  thickening and mild facet arthropathy. There is mild left foraminal  narrowing secondary to bulging disc material.     At L2-3, there is degenerative anterior spondylolisthesis of L2 to on L3  by approximately 5 mm. There is severe central canal stenosis secondary  to unroofed disc material, ligamentum flavum thickening, and facet  hypertrophic change. There is mild to moderate right and moderate to  severe left foraminal narrowing secondary to unroofed disc material and  facet hypertrophic change.     At L3-4, there is moderate to severe central canal stenosis secondary to  the anterior spondylolisthesis of L3 on L4 by approximately 5 to 6 mm,  ligamentum flavum thickening, and severe facet hypertrophic change.  Unroofed bulging disc material and facet hypertrophy results in a mild  degree of bilateral foraminal narrowing.     At L4-5, there is a disc bulge eccentric to the left which mildly  indents the ventral subarachnoid space. There is moderate right and mild  to moderate left foraminal narrowing secondary to bulging disc material  and facet hypertrophy.     At L5-S1, there is no significant degree of canal or foraminal stenosis.     Incidental note is made of an abdominal aortic  aneurysm which measures  up to approximately 4.5 cm in maximal diameter. Please refer to the  recent CT scan of the abdomen and pelvis with regards to further details  of this aneurysm. Incidental note is made of a hyperplastic appearance  of the adrenal glands       Impression:         There is transitional anatomy at the level of the lumbosacral junction  with a transitional vertebral body enumerated as a relatively sacralized  L5 segment for the purposes of this report. Please take careful note of  this enumeration system at the time of any potential intervention.     There are multilevel degenerative phenomena appreciated within the  lumbar spine that have been discussed in detail above. There is grade 1  degenerative anterior spondylolisthesis of L2 on L3 and L3 on L4. There  is a mild degree of dextroconvex scoliotic curvature of the lumbar spine  with its apex centered at L3.     There is a severe degree of central canal stenosis at L2-3 and a  moderate to severe degree of central canal stenosis at L3-4. These are  the levels of the most prominent canal stenosis within the lumbar spine.  Multilevel foraminal stenotic changes are additionally appreciated and  the most prominent foraminal stenosis is seen within the left L2-3 and  the right L4-5 neural foramina where there is moderate to severe left  L2-3 and moderate left L4-5 foraminal stenosis.     Incidental note is made of a 4.5 cm abdominal aortic aneurysm.  Incidental note is also made of a hyperplastic appearance of the adrenal  glands.     This report was finalized on 11/1/2023 7:45 PM by Dr. Sohail Guillermo M.D  on Workstation: [id number]       CT Angiogram Chest [[id number]] Collected: 10/31/23 1627     Updated: 10/31/23 1639    Narrative:      EXAMINATION: CT OF THE CHEST, ABDOMEN AND PELVIS WITH CONTRAST- CTA     TECHNIQUE: Computed tomography of the chest, abdomen and pelvis after  the uneventful administration of nonionic intravenous contrast per  CTA  protocol. Radiation dose reduction techniques were utilized, including  automated exposure control and exposure modulation based on body size.  3D reconstructions were performed     HISTORY: Abdominal aortic aneurysm and possible pseudoaneurysm     COMPARISON: None available     FINDINGS: The thoracic aorta is normal in caliber. There is mild to  moderate atherosclerotic plaque. No dissection is seen. The great  vessels of the neck are normal.     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 An infrarenal abdominal  aortic aneurysm measures 4.1 cm anteroposteriorly by 4.3 cm  mediolaterally on sagittal and coronal images respectively. 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 Mild stenosis is seen  of the right renal artery. The main left renal artery is patent. There  is a slightly delayed nephrogram on the left. The ROSE, common, external  iliac, common femoral, and visualized femoral and profunda femoris  arteries are patent. The right internal iliac artery is patent. There is  mild stenosis of the origin of the left internal iliac artery.     CHEST:  Dependent atelectasis is seen in the lungs along with emphysematous  changes. A right upper lobe nodule measures 5 mm on series 1, image 61.  No consolidation, pleural effusion, or pneumothorax are seen. There are  punctate additional pulmonary nodules. The central airways are patent.     The heart is normal in size and configuration, without pericardial  effusion. Coronary calcifications are seen. No enlarged supraclavicular,  axillary, mediastinal, or hilar lymph nodes are demonstrated.     Abdomen and pelvis:     There is left adrenal thickening, likely adenomatous. A tiny  low-attenuation left renal lesion is technically indeterminate, likely a  cyst. There is a calcified uterine fibroid. Colonic diverticula are  seen, without evidence of acute diverticulitis. Pancreatic calcification  likely reflects chronic pancreatitis.     The  liver, gallbladder, spleen,, stomach, small bowel, urinary bladder,  appendix are normal. No intraperitoneal fluid collection or free gas are  seen. No enlarged lymph nodes are demonstrated.     Bone windows demonstrate degenerative changes, without suspicious  osseous lesion seen.       Impression:      1. Infrarenal abdominal aortic 4.1 x 4.3 cm aneurysm. No pseudoaneurysm  seen.     2. 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 Right upper lobe pulmonary nodule. Consider 6-month follow-up if  clinically indicated     4. Additional incidental findings as above.     This report was finalized on 10/31/2023 4:36 PM by Dr. Fito Cat M.D  on Workstation: [id number]       CT Angiogram Abdomen Pelvis [[id number]] Collected: 10/31/23 1627     Updated: 10/31/23 1639    Narrative:      EXAMINATION: CT OF THE CHEST, ABDOMEN AND PELVIS WITH CONTRAST- CTA     TECHNIQUE: Computed tomography of the chest, abdomen and pelvis after  the uneventful administration of nonionic intravenous contrast per CTA  protocol. Radiation dose reduction techniques were utilized, including  automated exposure control and exposure modulation based on body size.  3D reconstructions were performed     HISTORY: Abdominal aortic aneurysm and possible pseudoaneurysm     COMPARISON: None available     FINDINGS: The thoracic aorta is normal in caliber. There is mild to  moderate atherosclerotic plaque. No dissection is seen. The great  vessels of the neck are normal.     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 An infrarenal abdominal  aortic aneurysm measures 4.1 cm anteroposteriorly by 4.3 cm  mediolaterally on sagittal and coronal images respectively. 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 Mild stenosis is seen  of the right renal artery. The main left renal artery is patent. There  is a slightly delayed nephrogram on the left. The ROSE, common, external  iliac, common femoral, and visualized femoral and  profunda femoris  arteries are patent. The right internal iliac artery is patent. There is  mild stenosis of the origin of the left internal iliac artery.     CHEST:  Dependent atelectasis is seen in the lungs along with emphysematous  changes. A right upper lobe nodule measures 5 mm on series 1, image 61.  No consolidation, pleural effusion, or pneumothorax are seen. There are  punctate additional pulmonary nodules. The central airways are patent.     The heart is normal in size and configuration, without pericardial  effusion. Coronary calcifications are seen. No enlarged supraclavicular,  axillary, mediastinal, or hilar lymph nodes are demonstrated.     Abdomen and pelvis:     There is left adrenal thickening, likely adenomatous. A tiny  low-attenuation left renal lesion is technically indeterminate, likely a  cyst. There is a calcified uterine fibroid. Colonic diverticula are  seen, without evidence of acute diverticulitis. Pancreatic calcification  likely reflects chronic pancreatitis.     The liver, gallbladder, spleen,, stomach, small bowel, urinary bladder,  appendix are normal. No intraperitoneal fluid collection or free gas are  seen. No enlarged lymph nodes are demonstrated.     Bone windows demonstrate degenerative changes, without suspicious  osseous lesion seen.       Impression:      1. Infrarenal abdominal aortic 4.1 x 4.3 cm aneurysm. No pseudoaneurysm  seen.     2. 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 Right upper lobe pulmonary nodule. Consider 6-month follow-up if  clinically indicated     4. Additional incidental findings as above.     This report was finalized on 10/31/2023 4:36 PM by Dr. Fito Cat M.D  on Workstation: [id number]       CT Head Without Contrast [[id number]] Collected: 10/31/23 1503     Updated: 10/31/23 1543    Narrative:      CT HEAD, CERVICAL/THORACIC/LUMBAR SPINE WITHOUT CONTRAST     HISTORY: Fall, pain in above areas.     COMPARISON: None.     CT HEAD  WITHOUT CONTRAST:     The brain and ventricles are symmetrical. There is no evidence of  intracranial hemorrhage, hydrocephalus or of abnormal extra-axial fluid.  Moderate small vessel ischemic disease is noted. A lacunar infarct is  appreciated involving the thalamic nuclei bilaterally each measuring  approximately 5 mm in size, age indeterminate. A lacunar infarct  involving the body of the caudate nucleus on the right is appreciated  measuring approximately 7 mm in diameter. No obvious area of decreased  attenuation to suggest an acute infarction is identified.       Impression:      There is no evidence of fracture or of intracranial  hemorrhage. Atrophy, small vessel ischemic disease and age-indeterminate  lacunar infarcts involving the thalamic nuclei are noted bilaterally.  Further evaluation could be performed with a MRI examination of the  brain as indicated.     CT EXAMINATAION OF THE CERVICAL SPINE WITHOUT CONTRAST:      Moderate-to-severe degenerative disease is noted at C1-C2 anteriorly. A  moderate pannus is noted posterior to the dens. There is fusion of the  facets to the left at C2-3. There is a grade 1 anterolisthesis of C4  upon C5 estimated to be 2 mm. Moderate-to-severe loss of disc height and  moderate endplate degenerative changes are noted at C6-7. There is no  evidence of prevertebral edema or of fracture.     C2-3: There is no evidence of disc bulge or herniation.     C3-4: A mild central disc bulge is present. Moderate facet degenerative  disease is present bilaterally.     C4-5: Moderate-to-severe facet degenerative disease is present on the  right. Moderate foraminal stenosis is present on the right secondary to  facet hypertrophy and uncovertebral degenerative disease.     C5-6: There is no evidence of disc bulge or herniation.     C6-7: Beam-hardening artifact from the patient's shoulders limits  evaluation of the cervical spine and spinal canal. A broad-based disc  osteophyte complex  is present resulting in flattening of the ventral  surface of the thecal sac.     C7-T1: There is no evidence of disc bulge or herniation.     IMPRESSION: Beam-hardening artifact limits evaluation of the lower  cervical spine. Multilevel degenerative disease involving the cervical  spine is noted as described above. There is no evidence of fracture. See  above.     CT EXAMINATION OF THE THORACIC SPINE WITHOUT CONTRAST:      There is a spiculated nodule appreciated involving the right upper lobe  posterolaterally measuring approximately 7 mm in size. The lungs are  only partially visualized.     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 Vacuum disc  effect is appreciated from T11 to L1. There is no evidence of a  compression fracture or of compression deformity. Evaluation of the  spinal canal is limited by technique but no obvious disc herniation is  appreciated.     IMPRESSION:  1.  Multilevel degenerative disease involving the thoracic spine is  noted as described above. This includes anterior ankylosis from T4 to  T11. No obvious fracture is identified. Further evaluation could be  performed with MRI examination of the a thoracic spine as indicated.  2.  There is partial visualization of the lungs. However, there is a  spiculated nodule involving the right upper lobe posterolaterally  measuring 7 mm in size. Comparison with prior studies is recommended. If  no prior study is available for comparison, a dedicated CT examination  of the chest is strongly recommended.     CT EXAMINATION OF THE LUMBAR SPINE WITHOUT CONTRAST:      There is a large fusiform infrarenal abdominal aortic aneurysm  appreciated measuring approximately 4.1 cm in the AP dimension and 4.6  cm in THE transverse dimension. Inferiorly there is a more focal  irregularity and protuberance which likely represents a pseudoaneurysm.  This measures approximately 4 mm in the AP dimension, 14 mm in the  craniocaudal dimension and 13 mm in  the transverse dimension.     The adrenal glands are prominent bilaterally more so on the left and low  in attenuation consistent with adrenal adenomas.     There is severe loss of disc height at L4-L5. Anterolisthesis of L3 upon  L4 is appreciated estimated to be approximately 4 mm.     L1-L2: A broad-based disc osteophyte complex is present resulting in  mild flattening of the ventral surface of the thecal sac.     L2-L3: There is moderate-to-severe central canal stenosis and severe  lateral recess narrowing bilaterally secondary to anterolisthesis of L2  upon L3 (estimated to be 4 mm), moderate facet degenerative disease and  a broad-based disc osteophyte complex.     L3-L4: There is severe central canal stenosis and lateral recess  narrowing bilaterally secondary to moderate-to-severe facet degenerative  disease, anterolisthesis of L4 upon L5 and a broad-based disc osteophyte  complex. Disc material extends into the neural foramen contributing to  mild foraminal stenosis bilaterally.     L4-L5: There is moderate canal stenosis, moderate lateral recess  narrowing on the right and severe lateral recess narrowing on the left  secondary to a broad-based disc osteophyte complex which is more  prominent to the left. Moderate-to-severe foraminal stenosis is present  bilaterally secondary to loss of disc height and extension of a disc  osteophyte complex into the neural foramen.     L5-S1: Transitional anatomy is appreciated consisting of sacralization  of L5. There is no evidence of central canal stenosis.     IMPRESSION:  3.  Transitional anatomy is appreciated consisting of sacralization of  L5. Careful correlation prior to any intervention is required given the  presence of transitional anatomy.  4.  Multilevel degenerative disease involving the lumbar spine is noted  as described above including severe canal stenosis at L3-L4,  moderate-to-severe canal stenosis at L2-L3 and moderate canal stenosis  at L4-L5. There  is severe lateral recess narrowing to the left at L4-L5,  bilaterally at L3-L4 and bilaterally at L2-L3. See above.  5.  There is a fusiform infrarenal abdominal aortic aneurysm measuring  approximately 4.1 x 4.6 cm in the transverse dimension. Along the  anterior and inferior aspect of the aneurysm is a focal protuberance  which projects beyond the calcified lumen suggesting a shallow  pseudoaneurysm. It measures approximately 4 mm in the AP dimension, 14  mm in craniocaudal dimension and 13 mm in the transverse dimension.     The above information was called to and discussed with Dr. Thornton.           Radiation dose reduction techniques were utilized, including automated  exposure control and exposure modulation based on body size.        This report was finalized on 10/31/2023 3:40 PM by Dr. Ron Moseley M.D on Workstation: [id number]       CT Cervical Spine Without Contrast [[id number]] Collected: 10/31/23 1503     Updated: 10/31/23 1543    Narrative:      CT HEAD, CERVICAL/THORACIC/LUMBAR SPINE WITHOUT CONTRAST     HISTORY: Fall, pain in above areas.     COMPARISON: None.     CT HEAD WITHOUT CONTRAST:     The brain and ventricles are symmetrical. There is no evidence of  intracranial hemorrhage, hydrocephalus or of abnormal extra-axial fluid.  Moderate small vessel ischemic disease is noted. A lacunar infarct is  appreciated involving the thalamic nuclei bilaterally each measuring  approximately 5 mm in size, age indeterminate. A lacunar infarct  involving the body of the caudate nucleus on the right is appreciated  measuring approximately 7 mm in diameter. No obvious area of decreased  attenuation to suggest an acute infarction is identified.       Impression:      There is no evidence of fracture or of intracranial  hemorrhage. Atrophy, small vessel ischemic disease and age-indeterminate  lacunar infarcts involving the thalamic nuclei are noted bilaterally.  Further evaluation could be performed with a MRI  examination of the  brain as indicated.     CT EXAMINATAION OF THE CERVICAL SPINE WITHOUT CONTRAST:      Moderate-to-severe degenerative disease is noted at C1-C2 anteriorly. A  moderate pannus is noted posterior to the dens. There is fusion of the  facets to the left at C2-3. There is a grade 1 anterolisthesis of C4  upon C5 estimated to be 2 mm. Moderate-to-severe loss of disc height and  moderate endplate degenerative changes are noted at C6-7. There is no  evidence of prevertebral edema or of fracture.     C2-3: There is no evidence of disc bulge or herniation.     C3-4: A mild central disc bulge is present. Moderate facet degenerative  disease is present bilaterally.     C4-5: Moderate-to-severe facet degenerative disease is present on the  right. Moderate foraminal stenosis is present on the right secondary to  facet hypertrophy and uncovertebral degenerative disease.     C5-6: There is no evidence of disc bulge or herniation.     C6-7: Beam-hardening artifact from the patient's shoulders limits  evaluation of the cervical spine and spinal canal. A broad-based disc  osteophyte complex is present resulting in flattening of the ventral  surface of the thecal sac.     C7-T1: There is no evidence of disc bulge or herniation.     IMPRESSION: Beam-hardening artifact limits evaluation of the lower  cervical spine. Multilevel degenerative disease involving the cervical  spine is noted as described above. There is no evidence of fracture. See  above.     CT EXAMINATION OF THE THORACIC SPINE WITHOUT CONTRAST:      There is a spiculated nodule appreciated involving the right upper lobe  posterolaterally measuring approximately 7 mm in size. The lungs are  only partially visualized.     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 Vacuum disc  effect is appreciated from T11 to L1. There is no evidence of a  compression fracture or of compression deformity. Evaluation of the  spinal canal is limited by  technique but no obvious disc herniation is  appreciated.     IMPRESSION:  1.  Multilevel degenerative disease involving the thoracic spine is  noted as described above. This includes anterior ankylosis from T4 to  T11. No obvious fracture is identified. Further evaluation could be  performed with MRI examination of the a thoracic spine as indicated.  2.  There is partial visualization of the lungs. However, there is a  spiculated nodule involving the right upper lobe posterolaterally  measuring 7 mm in size. Comparison with prior studies is recommended. If  no prior study is available for comparison, a dedicated CT examination  of the chest is strongly recommended.     CT EXAMINATION OF THE LUMBAR SPINE WITHOUT CONTRAST:      There is a large fusiform infrarenal abdominal aortic aneurysm  appreciated measuring approximately 4.1 cm in the AP dimension and 4.6  cm in THE transverse dimension. Inferiorly there is a more focal  irregularity and protuberance which likely represents a pseudoaneurysm.  This measures approximately 4 mm in the AP dimension, 14 mm in the  craniocaudal dimension and 13 mm in the transverse dimension.     The adrenal glands are prominent bilaterally more so on the left and low  in attenuation consistent with adrenal adenomas.     There is severe loss of disc height at L4-L5. Anterolisthesis of L3 upon  L4 is appreciated estimated to be approximately 4 mm.     L1-L2: A broad-based disc osteophyte complex is present resulting in  mild flattening of the ventral surface of the thecal sac.     L2-L3: There is moderate-to-severe central canal stenosis and severe  lateral recess narrowing bilaterally secondary to anterolisthesis of L2  upon L3 (estimated to be 4 mm), moderate facet degenerative disease and  a broad-based disc osteophyte complex.     L3-L4: There is severe central canal stenosis and lateral recess  narrowing bilaterally secondary to moderate-to-severe facet degenerative  disease,  anterolisthesis of L4 upon L5 and a broad-based disc osteophyte  complex. Disc material extends into the neural foramen contributing to  mild foraminal stenosis bilaterally.     L4-L5: There is moderate canal stenosis, moderate lateral recess  narrowing on the right and severe lateral recess narrowing on the left  secondary to a broad-based disc osteophyte complex which is more  prominent to the left. Moderate-to-severe foraminal stenosis is present  bilaterally secondary to loss of disc height and extension of a disc  osteophyte complex into the neural foramen.     L5-S1: Transitional anatomy is appreciated consisting of sacralization  of L5. There is no evidence of central canal stenosis.     IMPRESSION:  3.  Transitional anatomy is appreciated consisting of sacralization of  L5. Careful correlation prior to any intervention is required given the  presence of transitional anatomy.  4.  Multilevel degenerative disease involving the lumbar spine is noted  as described above including severe canal stenosis at L3-L4,  moderate-to-severe canal stenosis at L2-L3 and moderate canal stenosis  at L4-L5. There is severe lateral recess narrowing to the left at L4-L5,  bilaterally at L3-L4 and bilaterally at L2-L3. See above.  5.  There is a fusiform infrarenal abdominal aortic aneurysm measuring  approximately 4.1 x 4.6 cm in the transverse dimension. Along the  anterior and inferior aspect of the aneurysm is a focal protuberance  which projects beyond the calcified lumen suggesting a shallow  pseudoaneurysm. It measures approximately 4 mm in the AP dimension, 14  mm in craniocaudal dimension and 13 mm in the transverse dimension.     The above information was called to and discussed with Dr. Thornton.           Radiation dose reduction techniques were utilized, including automated  exposure control and exposure modulation based on body size.        This report was finalized on 10/31/2023 3:40 PM by Dr. Ron Moseley M.D on  Workstation: [id number]       CT Thoracic Spine Without Contrast [[id number]] Collected: 10/31/23 1503     Updated: 10/31/23 1543    Narrative:      CT HEAD, CERVICAL/THORACIC/LUMBAR SPINE WITHOUT CONTRAST     HISTORY: Fall, pain in above areas.     COMPARISON: None.     CT HEAD WITHOUT CONTRAST:     The brain and ventricles are symmetrical. There is no evidence of  intracranial hemorrhage, hydrocephalus or of abnormal extra-axial fluid.  Moderate small vessel ischemic disease is noted. A lacunar infarct is  appreciated involving the thalamic nuclei bilaterally each measuring  approximately 5 mm in size, age indeterminate. A lacunar infarct  involving the body of the caudate nucleus on the right is appreciated  measuring approximately 7 mm in diameter. No obvious area of decreased  attenuation to suggest an acute infarction is identified.       Impression:      There is no evidence of fracture or of intracranial  hemorrhage. Atrophy, small vessel ischemic disease and age-indeterminate  lacunar infarcts involving the thalamic nuclei are noted bilaterally.  Further evaluation could be performed with a MRI examination of the  brain as indicated.     CT EXAMINATAION OF THE CERVICAL SPINE WITHOUT CONTRAST:      Moderate-to-severe degenerative disease is noted at C1-C2 anteriorly. A  moderate pannus is noted posterior to the dens. There is fusion of the  facets to the left at C2-3. There is a grade 1 anterolisthesis of C4  upon C5 estimated to be 2 mm. Moderate-to-severe loss of disc height and  moderate endplate degenerative changes are noted at C6-7. There is no  evidence of prevertebral edema or of fracture.     C2-3: There is no evidence of disc bulge or herniation.     C3-4: A mild central disc bulge is present. Moderate facet degenerative  disease is present bilaterally.     C4-5: Moderate-to-severe facet degenerative disease is present on the  right. Moderate foraminal stenosis is present on the right secondary  to  facet hypertrophy and uncovertebral degenerative disease.     C5-6: There is no evidence of disc bulge or herniation.     C6-7: Beam-hardening artifact from the patient's shoulders limits  evaluation of the cervical spine and spinal canal. A broad-based disc  osteophyte complex is present resulting in flattening of the ventral  surface of the thecal sac.     C7-T1: There is no evidence of disc bulge or herniation.     IMPRESSION: Beam-hardening artifact limits evaluation of the lower  cervical spine. Multilevel degenerative disease involving the cervical  spine is noted as described above. There is no evidence of fracture. See  above.     CT EXAMINATION OF THE THORACIC SPINE WITHOUT CONTRAST:      There is a spiculated nodule appreciated involving the right upper lobe  posterolaterally measuring approximately 7 mm in size. The lungs are  only partially visualized.     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 Vacuum disc  effect is appreciated from T11 to L1. There is no evidence of a  compression fracture or of compression deformity. Evaluation of the  spinal canal is limited by technique but no obvious disc herniation is  appreciated.     IMPRESSION:  1.  Multilevel degenerative disease involving the thoracic spine is  noted as described above. This includes anterior ankylosis from T4 to  T11. No obvious fracture is identified. Further evaluation could be  performed with MRI examination of the a thoracic spine as indicated.  2.  There is partial visualization of the lungs. However, there is a  spiculated nodule involving the right upper lobe posterolaterally  measuring 7 mm in size. Comparison with prior studies is recommended. If  no prior study is available for comparison, a dedicated CT examination  of the chest is strongly recommended.     CT EXAMINATION OF THE LUMBAR SPINE WITHOUT CONTRAST:      There is a large fusiform infrarenal abdominal aortic aneurysm  appreciated measuring  approximately 4.1 cm in the AP dimension and 4.6  cm in THE transverse dimension. Inferiorly there is a more focal  irregularity and protuberance which likely represents a pseudoaneurysm.  This measures approximately 4 mm in the AP dimension, 14 mm in the  craniocaudal dimension and 13 mm in the transverse dimension.     The adrenal glands are prominent bilaterally more so on the left and low  in attenuation consistent with adrenal adenomas.     There is severe loss of disc height at L4-L5. Anterolisthesis of L3 upon  L4 is appreciated estimated to be approximately 4 mm.     L1-L2: A broad-based disc osteophyte complex is present resulting in  mild flattening of the ventral surface of the thecal sac.     L2-L3: There is moderate-to-severe central canal stenosis and severe  lateral recess narrowing bilaterally secondary to anterolisthesis of L2  upon L3 (estimated to be 4 mm), moderate facet degenerative disease and  a broad-based disc osteophyte complex.     L3-L4: There is severe central canal stenosis and lateral recess  narrowing bilaterally secondary to moderate-to-severe facet degenerative  disease, anterolisthesis of L4 upon L5 and a broad-based disc osteophyte  complex. Disc material extends into the neural foramen contributing to  mild foraminal stenosis bilaterally.     L4-L5: There is moderate canal stenosis, moderate lateral recess  narrowing on the right and severe lateral recess narrowing on the left  secondary to a broad-based disc osteophyte complex which is more  prominent to the left. Moderate-to-severe foraminal stenosis is present  bilaterally secondary to loss of disc height and extension of a disc  osteophyte complex into the neural foramen.     L5-S1: Transitional anatomy is appreciated consisting of sacralization  of L5. There is no evidence of central canal stenosis.     IMPRESSION:  3.  Transitional anatomy is appreciated consisting of sacralization of  L5. Careful correlation prior to any  intervention is required given the  presence of transitional anatomy.  4.  Multilevel degenerative disease involving the lumbar spine is noted  as described above including severe canal stenosis at L3-L4,  moderate-to-severe canal stenosis at L2-L3 and moderate canal stenosis  at L4-L5. There is severe lateral recess narrowing to the left at L4-L5,  bilaterally at L3-L4 and bilaterally at L2-L3. See above.  5.  There is a fusiform infrarenal abdominal aortic aneurysm measuring  approximately 4.1 x 4.6 cm in the transverse dimension. Along the  anterior and inferior aspect of the aneurysm is a focal protuberance  which projects beyond the calcified lumen suggesting a shallow  pseudoaneurysm. It measures approximately 4 mm in the AP dimension, 14  mm in craniocaudal dimension and 13 mm in the transverse dimension.     The above information was called to and discussed with Dr. Thornton.           Radiation dose reduction techniques were utilized, including automated  exposure control and exposure modulation based on body size.        This report was finalized on 10/31/2023 3:40 PM by Dr. Ron Moseley M.D on Workstation: [id number]       CT Lumbar Spine Without Contrast [[id number]] Collected: 10/31/23 1503     Updated: 10/31/23 1543    Narrative:      CT HEAD, CERVICAL/THORACIC/LUMBAR SPINE WITHOUT CONTRAST     HISTORY: Fall, pain in above areas.     COMPARISON: None.     CT HEAD WITHOUT CONTRAST:     The brain and ventricles are symmetrical. There is no evidence of  intracranial hemorrhage, hydrocephalus or of abnormal extra-axial fluid.  Moderate small vessel ischemic disease is noted. A lacunar infarct is  appreciated involving the thalamic nuclei bilaterally each measuring  approximately 5 mm in size, age indeterminate. A lacunar infarct  involving the body of the caudate nucleus on the right is appreciated  measuring approximately 7 mm in diameter. No obvious area of decreased  attenuation to suggest an acute  infarction is identified.       Impression:      There is no evidence of fracture or of intracranial  hemorrhage. Atrophy, small vessel ischemic disease and age-indeterminate  lacunar infarcts involving the thalamic nuclei are noted bilaterally.  Further evaluation could be performed with a MRI examination of the  brain as indicated.     CT EXAMINATAION OF THE CERVICAL SPINE WITHOUT CONTRAST:      Moderate-to-severe degenerative disease is noted at C1-C2 anteriorly. A  moderate pannus is noted posterior to the dens. There is fusion of the  facets to the left at C2-3. There is a grade 1 anterolisthesis of C4  upon C5 estimated to be 2 mm. Moderate-to-severe loss of disc height and  moderate endplate degenerative changes are noted at C6-7. There is no  evidence of prevertebral edema or of fracture.     C2-3: There is no evidence of disc bulge or herniation.     C3-4: A mild central disc bulge is present. Moderate facet degenerative  disease is present bilaterally.     C4-5: Moderate-to-severe facet degenerative disease is present on the  right. Moderate foraminal stenosis is present on the right secondary to  facet hypertrophy and uncovertebral degenerative disease.     C5-6: There is no evidence of disc bulge or herniation.     C6-7: Beam-hardening artifact from the patient's shoulders limits  evaluation of the cervical spine and spinal canal. A broad-based disc  osteophyte complex is present resulting in flattening of the ventral  surface of the thecal sac.     C7-T1: There is no evidence of disc bulge or herniation.     IMPRESSION: Beam-hardening artifact limits evaluation of the lower  cervical spine. Multilevel degenerative disease involving the cervical  spine is noted as described above. There is no evidence of fracture. See  above.     CT EXAMINATION OF THE THORACIC SPINE WITHOUT CONTRAST:      There is a spiculated nodule appreciated involving the right upper lobe  posterolaterally measuring approximately 7  mm in size. The lungs are  only partially visualized.     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 Vacuum disc  effect is appreciated from T11 to L1. There is no evidence of a  compression fracture or of compression deformity. Evaluation of the  spinal canal is limited by technique but no obvious disc herniation is  appreciated.     IMPRESSION:  1.  Multilevel degenerative disease involving the thoracic spine is  noted as described above. This includes anterior ankylosis from T4 to  T11. No obvious fracture is identified. Further evaluation could be  performed with MRI examination of the a thoracic spine as indicated.  2.  There is partial visualization of the lungs. However, there is a  spiculated nodule involving the right upper lobe posterolaterally  measuring 7 mm in size. Comparison with prior studies is recommended. If  no prior study is available for comparison, a dedicated CT examination  of the chest is strongly recommended.     CT EXAMINATION OF THE LUMBAR SPINE WITHOUT CONTRAST:      There is a large fusiform infrarenal abdominal aortic aneurysm  appreciated measuring approximately 4.1 cm in the AP dimension and 4.6  cm in THE transverse dimension. Inferiorly there is a more focal  irregularity and protuberance which likely represents a pseudoaneurysm.  This measures approximately 4 mm in the AP dimension, 14 mm in the  craniocaudal dimension and 13 mm in the transverse dimension.     The adrenal glands are prominent bilaterally more so on the left and low  in attenuation consistent with adrenal adenomas.     There is severe loss of disc height at L4-L5. Anterolisthesis of L3 upon  L4 is appreciated estimated to be approximately 4 mm.     L1-L2: A broad-based disc osteophyte complex is present resulting in  mild flattening of the ventral surface of the thecal sac.     L2-L3: There is moderate-to-severe central canal stenosis and severe  lateral recess narrowing bilaterally secondary to  anterolisthesis of L2  upon L3 (estimated to be 4 mm), moderate facet degenerative disease and  a broad-based disc osteophyte complex.     L3-L4: There is severe central canal stenosis and lateral recess  narrowing bilaterally secondary to moderate-to-severe facet degenerative  disease, anterolisthesis of L4 upon L5 and a broad-based disc osteophyte  complex. Disc material extends into the neural foramen contributing to  mild foraminal stenosis bilaterally.     L4-L5: There is moderate canal stenosis, moderate lateral recess  narrowing on the right and severe lateral recess narrowing on the left  secondary to a broad-based disc osteophyte complex which is more  prominent to the left. Moderate-to-severe foraminal stenosis is present  bilaterally secondary to loss of disc height and extension of a disc  osteophyte complex into the neural foramen.     L5-S1: Transitional anatomy is appreciated consisting of sacralization  of L5. There is no evidence of central canal stenosis.     IMPRESSION:  3.  Transitional anatomy is appreciated consisting of sacralization of  L5. Careful correlation prior to any intervention is required given the  presence of transitional anatomy.  4.  Multilevel degenerative disease involving the lumbar spine is noted  as described above including severe canal stenosis at L3-L4,  moderate-to-severe canal stenosis at L2-L3 and moderate canal stenosis  at L4-L5. There is severe lateral recess narrowing to the left at L4-L5,  bilaterally at L3-L4 and bilaterally at L2-L3. See above.  5.  There is a fusiform infrarenal abdominal aortic aneurysm measuring  approximately 4.1 x 4.6 cm in the transverse dimension. Along the  anterior and inferior aspect of the aneurysm is a focal protuberance  which projects beyond the calcified lumen suggesting a shallow  pseudoaneurysm. It measures approximately 4 mm in the AP dimension, 14  mm in craniocaudal dimension and 13 mm in the transverse dimension.     The  above information was called to and discussed with Dr. Thornton.           Radiation dose reduction techniques were utilized, including automated  exposure control and exposure modulation based on body size.        This report was finalized on 10/31/2023 3:40 PM by Dr. Ron Moseley M.D on Workstation: [id number]       XR Hip With or Without Pelvis 2 - 3 View Left [[id number]] Collected: 10/31/23 1333     Updated: 10/31/23 1339    Narrative:      EXAM: XR HIP W OR WO PELVIS 2-3 VIEW LEFT-     INDICATION: Left hip pain status post fall     COMPARISON: None available          Impression:      No fracture. Normal alignment. Hip joint spaces maintained.  Likely calcified uterine fibroid.           This report was finalized on 10/31/2023 1:35 PM by Dr. Joey Ojeda M.D on Workstation: [id number]       XR Knee 3 View Left [[id number]] Collected: 10/31/23 1331     Updated: 10/31/23 1337    Narrative:      EXAM: XR KNEE 3 VW LEFT-     INDICATION: Left knee pain post fall     COMPARISON: None available          Impression:      No fracture. Normal alignment. Mild medial compartment  narrowing. No joint effusion. Arteriovascular calcifications.           This report was finalized on 10/31/2023 1:33 PM by Dr. Joey Ojeda M.D on Workstation: [id number]             Results for orders placed during the hospital encounter of 10/31/23    Duplex Venous Lower Extremity - Bilateral CAR    Interpretation Summary    Normal bilateral lower extremity venous duplex scan.      Pertinent Labs     Results from last 7 days   Lab Units 11/07/23  0525 11/03/23  0604 11/01/23  0649   WBC 10*3/mm3 6.77 5.87 7.06   HEMOGLOBIN g/dL 14.4 14.7 14.4   PLATELETS 10*3/mm3 168 144 148     Results from last 7 days   Lab Units 11/07/23  0525 11/03/23  0604 11/01/23  0649   SODIUM mmol/L 137 138 137   POTASSIUM mmol/L 4.1 3.9 4.0   CHLORIDE mmol/L 103 104 101   CO2 mmol/L 28.3 27.2 29.0   BUN mg/dL 17 17 17   CREATININE mg/dL 0.67 0.72 0.82   GLUCOSE mg/dL  "126* 121* 112*   EGFR mL/min/1.73 89.6 85.7 73.3       Results from last 7 days   Lab Units 11/07/23  0525 11/03/23  0604 11/01/23  0649   CALCIUM mg/dL 10.5 10.0 10.2               Invalid input(s): \"LDLCALC\"          Test Results Pending at Discharge   No pending test results at time of discharge    Discharge Details        Discharge Medications        New Medications        Instructions Start Date   amLODIPine 5 MG tablet  Commonly known as: NORVASC   5 mg, Oral, Every 24 Hours Scheduled   Start Date: November 8, 2023              Allergies   Allergen Reactions    Hydrocodone Nausea And Vomiting       Discharge Disposition:  Skilled Nursing Facility (DC - External)      Discharge Diet:  Diet Order   Procedures    Diet: Regular/House Diet; Texture: Regular Texture (IDDSI 7); Fluid Consistency: Thin (IDDSI 0)       Discharge Activity: Activity as tolerated      CODE STATUS:    Code Status and Medical Interventions:   Ordered at: 10/31/23 1746     Code Status (Patient has no pulse and is not breathing):    CPR (Attempt to Resuscitate)     Medical Interventions (Patient has pulse or is breathing):    Full Support       No future appointments.   Contact information for follow-up providers       Sherman Wilson MD Follow up in 6 month(s).    Specialty: Vascular Surgery  Why: for 4.3 cm AAA: Will need AAA ultrasound at that time  Contact information:  [address]  [facility] [id number] [phone]               Deangelo Sprague MD .    Specialty: Internal Medicine  Contact information:  [address]  [address]  [facility] [id number] [phone]                       Contact information for after-discharge care       Destination       [facility] .    Service: Skilled Nursing  Contact information:  [address] [id number] [phone]                                   Time Spent on Discharge:  Greater than 30 minutes      Génesis Linda MD  [facility]  11/07/23  12:41 EST

## 2023-11-08 ENCOUNTER — TELEPHONE (OUTPATIENT)
Dept: NEUROSURGERY | Facility: CLINIC | Age: 78
End: 2023-11-08
Payer: MEDICARE

## 2023-11-08 NOTE — CASE MANAGEMENT/SOCIAL WORK
Case Management Discharge Note      Final Note: Skilled bed at [facility]. Mulu WINSTON    Provided Post Acute Provider List?: Yes  Post Acute Provider List: Home Health, Nursing Home  Delivered To: Patient  Method of Delivery: In person    Selected Continued Care - Discharged on 11/7/2023 Admission date: 10/31/2023 - Discharge disposition: Skilled Nursing Facility (DC - External)      Destination Coordination complete.      Service Provider Selected Services Address Phone Fax Patient Preferred    [facility] [address] [place] [address] [phone] --              Durable Medical Equipment    No services have been selected for the patient.                Dialysis/Infusion    No services have been selected for the patient.                Home Medical Care    No services have been selected for the patient.                Therapy    No services have been selected for the patient.                Community Resources    No services have been selected for the patient.                Community & DME    No services have been selected for the patient.                         Final Discharge Disposition Code: 03 - skilled nursing facility (SNF)

## 2023-11-08 NOTE — TELEPHONE ENCOUNTER
"Left message for patient to call the office for an appointment with Dr Giles . \" Please schedule patient in the [place] office one month after injection which was given on 11/2/23\" Hub Ok to Schedule\"  "

## 2023-11-18 ENCOUNTER — HOME HEALTH ADMISSION (OUTPATIENT)
Dept: HOME HEALTH SERVICES | Facility: HOME HEALTHCARE | Age: 78
End: 2023-11-18
Payer: MEDICARE

## 2023-11-18 ENCOUNTER — TRANSCRIBE ORDERS (OUTPATIENT)
Dept: HOME HEALTH SERVICES | Facility: HOME HEALTHCARE | Age: 78
End: 2023-11-18
Payer: MEDICARE

## 2023-11-18 DIAGNOSIS — M47.9 OSTEOARTHRITIS OF SPINE, UNSPECIFIED SPINAL OSTEOARTHRITIS COMPLICATION STATUS, UNSPECIFIED SPINAL REGION: ICD-10-CM

## 2023-11-18 DIAGNOSIS — M48.061 SPINAL STENOSIS OF LUMBAR REGION WITH RADICULOPATHY: ICD-10-CM

## 2023-11-18 DIAGNOSIS — U07.1 COVID-19: Primary | ICD-10-CM

## 2023-11-18 DIAGNOSIS — M54.16 SPINAL STENOSIS OF LUMBAR REGION WITH RADICULOPATHY: ICD-10-CM

## 2023-11-20 ENCOUNTER — HOME CARE VISIT (OUTPATIENT)
Dept: HOME HEALTH SERVICES | Facility: HOME HEALTHCARE | Age: 78
End: 2023-11-20
Payer: MEDICARE

## 2023-11-20 VITALS
HEART RATE: 85 BPM | SYSTOLIC BLOOD PRESSURE: 102 MMHG | OXYGEN SATURATION: 97 % | RESPIRATION RATE: 16 BRPM | DIASTOLIC BLOOD PRESSURE: 70 MMHG

## 2023-11-20 PROCEDURE — G0151 HHCP-SERV OF PT,EA 15 MIN: HCPCS

## 2023-11-21 ENCOUNTER — OFFICE VISIT (OUTPATIENT)
Dept: INTERNAL MEDICINE | Facility: CLINIC | Age: 78
End: 2023-11-21
Payer: MEDICARE

## 2023-11-21 VITALS
WEIGHT: 160 LBS | DIASTOLIC BLOOD PRESSURE: 80 MMHG | OXYGEN SATURATION: 97 % | HEIGHT: 63 IN | SYSTOLIC BLOOD PRESSURE: 112 MMHG | HEART RATE: 107 BPM | BODY MASS INDEX: 28.35 KG/M2 | TEMPERATURE: 97 F

## 2023-11-21 DIAGNOSIS — J30.9 ALLERGIC RHINITIS, UNSPECIFIED SEASONALITY, UNSPECIFIED TRIGGER: ICD-10-CM

## 2023-11-21 DIAGNOSIS — Z13.820 ENCOUNTER FOR SCREENING FOR OSTEOPOROSIS: ICD-10-CM

## 2023-11-21 DIAGNOSIS — I71.40 ABDOMINAL ANEURYSM: ICD-10-CM

## 2023-11-21 DIAGNOSIS — Z09 ENCOUNTER FOR FOLLOW-UP EXAMINATION AFTER COMPLETED TREATMENT FOR CONDITIONS OTHER THAN MALIGNANT NEOPLASM: ICD-10-CM

## 2023-11-21 DIAGNOSIS — Z23 NEED FOR INFLUENZA VACCINATION: ICD-10-CM

## 2023-11-21 DIAGNOSIS — Z13.6 ENCOUNTER FOR SCREENING FOR CARDIOVASCULAR DISORDERS: ICD-10-CM

## 2023-11-21 DIAGNOSIS — G89.29 CHRONIC LOW BACK PAIN, UNSPECIFIED BACK PAIN LATERALITY, UNSPECIFIED WHETHER SCIATICA PRESENT: Primary | ICD-10-CM

## 2023-11-21 DIAGNOSIS — R73.03 PREDIABETES: ICD-10-CM

## 2023-11-21 DIAGNOSIS — M54.50 CHRONIC LOW BACK PAIN, UNSPECIFIED BACK PAIN LATERALITY, UNSPECIFIED WHETHER SCIATICA PRESENT: Primary | ICD-10-CM

## 2023-11-21 DIAGNOSIS — Z00.00 MEDICARE ANNUAL WELLNESS VISIT, SUBSEQUENT: ICD-10-CM

## 2023-11-21 DIAGNOSIS — Z79.899 HIGH RISK MEDICATION USE: ICD-10-CM

## 2023-11-21 RX ORDER — LORATADINE 10 MG/1
10 TABLET ORAL DAILY
Qty: 90 TABLET | Refills: 3 | Status: SHIPPED | OUTPATIENT
Start: 2023-11-21

## 2023-11-21 RX ORDER — GABAPENTIN 100 MG/1
100 CAPSULE ORAL 2 TIMES DAILY
Qty: 60 CAPSULE | Refills: 0 | Status: SHIPPED | OUTPATIENT
Start: 2023-11-21 | End: 2023-12-21

## 2023-11-21 NOTE — PROGRESS NOTES
The ABCs of the Annual Wellness Visit  Subsequent Medicare Wellness Visit    Subjective    Kirti Santillan is a 77 y.o. female who presents for a Subsequent Medicare Wellness Visit.    The following portions of the patient's history were reviewed and   updated as appropriate: allergies, current medications, past family history, past medical history, past social history, past surgical history, and problem list.    Compared to one year ago, the patient feels her physical   health is the same.    Compared to one year ago, the patient feels her mental   health is the same.    Recent Hospitalizations:  This patient has had a [facility] admission record on file within the last 365 days.    Current Medical Providers:  Patient Care Team:  Deangelo Sprague MD as PCP - General (Internal Medicine)    Outpatient Medications Prior to Visit   Medication Sig Dispense Refill    amLODIPine (NORVASC) 5 MG tablet Take 1 tablet by mouth Daily. 30 tablet 0    Ibuprofen (Advil) 200 MG capsule Take 800 mg by mouth Daily. Indications: Backache, pain      gabapentin (NEURONTIN) 100 MG capsule Take 1 capsule by mouth 2 (Two) Times a Day. Indications: leg pain      loratadine (CLARITIN) 10 MG tablet Take 1 tablet by mouth Daily. 1/2 pill 5mg   Indications: Hayfever       No facility-administered medications prior to visit.       No opioid medication identified on active medication list. I have reviewed chart for other potential  high risk medication/s and harmful drug interactions in the elderly.        Aspirin is not on active medication list.  Aspirin use is contraindicated for this patient due to: history of falls.  .    Patient Active Problem List   Diagnosis    Multiple falls    Abdominal aneurysm    Chronic back pain    Lumbar radiculopathy    Lumbar spinal stenosis    High blood pressure    Prediabetes     Advance Care Planning   Advance Care Planning     Advance Directive is not on file.  ACP discussion was held with the patient  "during this visit. Patient has an advance directive (not in EMR), copy requested.     Objective    Vitals:    23 1255   BP: 112/80   Pulse: 107   Temp: 97 °F (36.1 °C)   TempSrc: Temporal   SpO2: 97%   Weight: 72.6 kg (160 lb)   Height: 160 cm (62.99\")     Estimated body mass index is 28.35 kg/m² as calculated from the following:    Height as of this encounter: 160 cm (62.99\").    Weight as of this encounter: 72.6 kg (160 lb).    BMI is >= 25 and <30. (Overweight) The following options were offered after discussion;: healthy dietary habits, at risk of weight loss and unable to participate and exercise given severity of DDD and spinal stenosis       Does the patient have evidence of cognitive impairment? No    Lab Results   Component Value Date    HGBA1C 6.30 (H) 2023        HEALTH RISK ASSESSMENT    Smoking Status:  Social History     Tobacco Use   Smoking Status Former    Types: Cigarettes    Passive exposure: Past   Smokeless Tobacco Never     Alcohol Consumption:  Social History     Substance and Sexual Activity   Alcohol Use Not Currently     Fall Risk Screen:    STEADI Fall Risk Assessment was completed, and patient is at HIGH risk for falls. Assessment completed on:2023    Depression Screenin/21/2023     1:02 PM   PHQ-2/PHQ-9 Depression Screening   Little Interest or Pleasure in Doing Things 1-->several days   Feeling Down, Depressed or Hopeless 1-->several days   Trouble Falling or Staying Asleep, or Sleeping Too Much 0-->not at all   Feeling Tired or Having Little Energy 2-->more than half the days   Poor Appetite or Overeating 1-->several days   Feeling Bad about Yourself - or that You are a Failure or Have Let Yourself or Your Family Down 3-->nearly every day   Trouble Concentrating on Things, Such as Reading the Newspaper or Watching Television 0-->not at all   Moving or Speaking So Slowly that Other People Could Have Noticed? Or the Opposite - Being So Fidgety 0-->not at all "   Thoughts that You Would be Better Off Dead or of Hurting Yourself in Some Way 3-->nearly every day   PHQ-9: Brief Depression Severity Measure Score 11   If You Checked Off Any Problems, How Difficult Have These Problems Made It For You to Do Your Work, Take Care of Things at Home, or Get Along with Other People? not difficult at all       Health Habits and Functional and Cognitive Screenin/21/2023    12:58 PM   Functional & Cognitive Status   Do you have difficulty preparing food and eating? Yes   Do you have difficulty bathing yourself, getting dressed or grooming yourself? Yes   Do you have difficulty using the toilet? Yes   Do you have difficulty moving around from place to place? Yes   Do you have trouble with steps or getting out of a bed or a chair? Yes   Current Diet Well Balanced Diet   Dental Exam Not up to date   Eye Exam Not up to date   Exercise (times per week) 2 times per week        Exercise Comment Physical therapy, chair excerise   Do you need help using the phone?  No   Are you deaf or do you have serious difficulty hearing?  No   Do you need help to go to places out of walking distance? Yes   Do you need help shopping? Yes   Do you need help preparing meals?  Yes   Do you need help with housework?  Yes   Do you need help with laundry? Yes   Do you need help taking your medications? No   Do you need help managing money? No   Do you ever drive or ride in a car without wearing a seat belt? No   Have you felt unusual stress, anger or loneliness in the last month? Yes   Who do you live with? Spouse   If you need help, do you have trouble finding someone available to you? No   Have you been bothered in the last four weeks by sexual problems? No       Age-appropriate Screening Schedule:  Refer to the list below for future screening recommendations based on patient's age, sex and/or medical conditions. Orders for these recommended tests are listed in the plan section. The patient has been  provided with a written plan.    Health Maintenance   Topic Date Due    DXA SCAN  Never done    BMI FOLLOWUP  Never done    TDAP/TD VACCINES (1 - Tdap) Never done    ZOSTER VACCINE (1 of 2) Never done    Pneumococcal Vaccine 65+ (1 - PCV) Never done    COVID-19 Vaccine (4 - 2023-24 season) 09/01/2023    HEPATITIS C SCREENING  Never done    ANNUAL WELLNESS VISIT  Never done    INFLUENZA VACCINE  Completed                  CMS Preventative Services Quick Reference  Risk Factors Identified During Encounter  Chronic Pain: Natural history and expected course discussed. Questions answered.  Will refill gabapentin given severity of spinal stenosis. Patient advised to continue follow up with neurosurgery   Depression/Dysphoria: Suicide Risk Assessment performed and Elevated PHQ score reflective of other stress or illness, not depression  Fall Risk-High or Moderate: Discussed Fall Prevention in the home and has home PT after recent hospitalization   Immunizations Discussed/Encouraged: Tdap, Prevnar 20 (Pneumococcal 20-valent conjugate), and Shingrix. Declined today after risks/benefits but will consider in future   Polypharmacy: Medication List reviewed and Medications are appropriate for patient  Dental Screening Recommended  Vision Screening Recommended  The above risks/problems have been discussed with the patient.  Pertinent information has been shared with the patient in the After Visit Summary.  An After Visit Summary and PPPS were made available to the patient.    Follow Up:   Next Medicare Wellness visit to be scheduled in 1 year.       Additional E&M Note during same encounter follows:  Patient has multiple medical problems which are significant and separately identifiable that require additional work above and beyond the Medicare Wellness Visit.      Chief Complaint  Establish Care, Pain, and Hypertension          Subjective        HPI  Kirti Santillan is also being seen today as a new patient establishing care for  "chronic back pain secondary to spinal stenosis and recent hospitalization, prediabetes, and concern for hypertension.    She previously followed with Dr. Rogel who is her previous PCP however she reports it has been likely nearly a decade since she has been seen by him.    She was admitted to the hospital from 10/31-11/7 after a fall. Workup demonstrated severe lumbar spinal stenosis, where neurosurgery deferred surgical intervention and she instead received an epidural with significant improvement in pain. She also was found to have an infrarenal abdominal aortic aneurysm sized at 4.3cm. Vascular surgery recommended no surgical intervention given <5cm and to follow up in clinic in 6 weeks for AA ultrasound. She was discharged to a SNF and reports she is glad to be home with no recurrent falls and has been working with PT at home.          Objective   Vital Signs:  /80   Pulse 107   Temp 97 °F (36.1 °C) (Temporal)   Ht 160 cm (62.99\")   Wt 72.6 kg (160 lb)   SpO2 97%   BMI 28.35 kg/m²     Physical Exam  Vitals reviewed.   Constitutional:       General: She is not in acute distress.     Appearance: Normal appearance. She is ill-appearing.   HENT:      Head: Normocephalic and atraumatic.   Eyes:      General: No scleral icterus.     Extraocular Movements: Extraocular movements intact.      Conjunctiva/sclera: Conjunctivae normal.   Cardiovascular:      Rate and Rhythm: Regular rhythm. Tachycardia present.   Pulmonary:      Effort: Pulmonary effort is normal. No respiratory distress.      Breath sounds: No wheezing.   Abdominal:      General: Abdomen is flat. There is no distension.      Palpations: Abdomen is soft.   Musculoskeletal:      Lumbar back: Decreased range of motion. Scoliosis present.   Skin:     General: Skin is warm and dry.   Neurological:      General: No focal deficit present.      Mental Status: She is alert and oriented to person, place, and time.      Motor: No weakness.      Gait: " Gait abnormal.      Comments: Sitting in walker, no focal deficits appreciated   Psychiatric:         Mood and Affect: Mood normal.         Behavior: Behavior normal.          The following data was reviewed by: Deangelo Sprague MD on 11/21/2023:  CMP          11/1/2023    06:49 11/3/2023    06:04 11/7/2023    05:25   CMP   Glucose 112  121  126    BUN 17  17  17    Creatinine 0.82  0.72  0.67    EGFR 73.3  85.7  89.6    Sodium 137  138  137    Potassium 4.0  3.9  4.1    Chloride 101  104  103    Calcium 10.2  10.0  10.5    BUN/Creatinine Ratio 20.7  23.6  25.4    Anion Gap 7.0  6.8  5.7      CBC          11/1/2023    06:49 11/3/2023    06:04 11/7/2023    05:25   CBC   WBC 7.06  5.87  6.77    RBC 4.90  4.94  4.78    Hemoglobin 14.4  14.7  14.4    Hematocrit 43.0  44.4  42.8    MCV 87.8  89.9  89.5    MCH 29.4  29.8  30.1    MCHC 33.5  33.1  33.6    RDW 13.1  13.4  13.4    Platelets 148  144  168      TSH          11/1/2023    06:49   TSH   TSH 3.400      Most Recent A1C          11/2/2023    05:56   HGBA1C Most Recent   Hemoglobin A1C 6.30      Data reviewed : Radiologic studies MRI L-spine, CTA Chest, abdomen, Consultant notes neurosurgery, pain management, and Recent hospitalization notes H&P and DC summary           Assessment and Plan   Diagnoses and all orders for this visit:    1. Chronic low back pain, unspecified back pain laterality, unspecified whether sciatica present (Primary)  -     Compliance Drug Analysis, Ur - Urine, Clean Catch  -     gabapentin (NEURONTIN) 100 MG capsule; Take 1 capsule by mouth 2 (Two) Times a Day for 30 days. Indications: leg pain  Dispense: 60 capsule; Refill: 0    2. Abdominal aneurysm    3. Prediabetes  -     Lipid Panel  -     Comprehensive Metabolic Panel    4. High risk medication use  -     Compliance Drug Analysis, Ur - Urine, Clean Catch    5. Medicare annual wellness visit, subsequent    6. Encounter for screening for osteoporosis  -     DEXA Bone Density Axial;  Future    7. Allergic rhinitis, unspecified seasonality, unspecified trigger  -     loratadine (CLARITIN) 10 MG tablet; Take 1 tablet by mouth Daily. 1/2 pill 5mg   Indications: Hayfever  Dispense: 90 tablet; Refill: 3    8. Encounter for follow-up examination after completed treatment for conditions other than malignant neoplasm  -     DEXA Bone Density Axial; Future    9. Encounter for screening for cardiovascular disorders  -     Lipid Panel    10. Need for influenza vaccination  -     Fluzone High-Dose 65+yrs      Reviewed neurosurgery, pain management and vascular surgery notes and labs obtained during hospitalization.     Will refill gabapentin and appears neurosurgery is working to schedule her in early December.     For her newly diagnosed AAA, will refer her to vascular surgery as it appears they wanted to follow up with her in 6wk to repeat abdominal US.     She was started on amlodipine during hospitalization for concern of HTN however her BP is 112/80 and home readings are consistently <130/80. Counseled to continue checking and to prevent further orthostasis, will hold on initiating  .   Will follow up in 3mo to evaluate symptoms and address further healthcare maintenance needs         Follow Up   Return in about 3 months (around 2/21/2024) for Please schedule for 30min appt .  Patient was given instructions and counseling regarding her condition or for health maintenance advice. Please see specific information pulled into the AVS if appropriate.

## 2023-11-21 NOTE — CASE COMMUNICATION
SOC OASIS completed today.   Pt is a 76yo F who was hospitalized from 10/31 to 11/7 followed by a SNF stay until 11/18/2023 secondary to multiple falls, lumbar spinal stenosis, lumbar radiculopathy, s/p epidural on 11/2/2023.  She was also found to have an AAA 4.3cm and also a lung nodule.  Her A1C in the hospital suggested pre diabetes with recommendations for dietary lifestyle changes.  While in SNF she was diagnosed with COVID 19 on  11/8/2023 but pt denies ever having symptoms.  She had multiple falls prior to hospital stay due to L LE giving out on her and back pain.   Pt resides with spouse and son in a 1 story home with ramp to enter.  There are 2 steps to the den with railing.  The home is cluttered. She reports she is currently having difficulty with bed mobility, L LE pain, and overall fear of falling.    PLOF: Independent with bathing, dressing, showering, a mb with rollator.  Standing in kitchen to cook was limited by back pain.   Was driving prior to hospital stay, but giving it up.  Pt has a history of falls (4 falls prior to hospital stay).    FOC: Lumbar spinal stenosis  Skilled PT necessary to instruct pt in pain management/positioning, HEP, optimize her gait, and decrease her risk for falls.    Plan for PT 1wk1, 2wk3.  OT to eval and treat for ADL's.

## 2023-11-21 NOTE — HOME HEALTH
Pt is a 78yo F who was hospitalized from 10/31 to 11/7 followed by a SNF stay until 11/18/2023 secondary to multiple falls, lumbar spinal stenosis, lumbar radiculopathy, s/p epidural on 11/2/2023.  She was also found to have an AAA 4.3cm and also a lung nodule.  Her A1C in the hospital suggested pre diabetes with recommendations for dietary lifestyle changes.  While in SNF she was diagnosed with COVID 19 but pt denies ever having symptoms.  She had multiple falls prior to hospital stay due to L LE giving out on her and back pain.   Pt resides with spouse and son in a 1 story home with ramp to enter.  There are 2 steps to the den with railing.  The home is cluttered. She reports she is currently having difficulty with bed mobility, L LE pain, and overall fear of falling.    PLOF: Independent with bathing, dressing, showering, amb with rollator.  Standing in kitchen to cook was limited by back pain.   Was driving prior to hospital stay, but giving it up.  Pt has a history of falls (4 falls prior to hospital stay).    FOC: Lumbar spinal stenosis  Skilled PT necessary to instruct pt in pain management/positioning, HEP, optimize her gait, and decrease her risk for falls.    Plan for PT 1wk1, 2wk3.  OT to eval and treat for ADL's.

## 2023-11-22 LAB
ALBUMIN SERPL-MCNC: 4 G/DL (ref 3.8–4.8)
ALBUMIN/GLOB SERPL: 1.4 {RATIO} (ref 1.2–2.2)
ALP SERPL-CCNC: 138 IU/L (ref 44–121)
ALT SERPL-CCNC: 19 IU/L (ref 0–32)
AST SERPL-CCNC: 20 IU/L (ref 0–40)
BILIRUB SERPL-MCNC: 0.5 MG/DL (ref 0–1.2)
BUN SERPL-MCNC: 23 MG/DL (ref 8–27)
BUN/CREAT SERPL: 35 (ref 12–28)
CALCIUM SERPL-MCNC: 10.8 MG/DL (ref 8.7–10.3)
CHLORIDE SERPL-SCNC: 101 MMOL/L (ref 96–106)
CHOLEST SERPL-MCNC: 189 MG/DL (ref 100–199)
CO2 SERPL-SCNC: 23 MMOL/L (ref 20–29)
CREAT SERPL-MCNC: 0.65 MG/DL (ref 0.57–1)
EGFRCR SERPLBLD CKD-EPI 2021: 90 ML/MIN/1.73
GLOBULIN SER CALC-MCNC: 2.9 G/DL (ref 1.5–4.5)
GLUCOSE SERPL-MCNC: 211 MG/DL (ref 70–99)
HDLC SERPL-MCNC: 46 MG/DL
LDLC SERPL CALC-MCNC: 109 MG/DL (ref 0–99)
POTASSIUM SERPL-SCNC: 4.4 MMOL/L (ref 3.5–5.2)
PROT SERPL-MCNC: 6.9 G/DL (ref 6–8.5)
SODIUM SERPL-SCNC: 139 MMOL/L (ref 134–144)
TRIGL SERPL-MCNC: 195 MG/DL (ref 0–149)
VLDLC SERPL CALC-MCNC: 34 MG/DL (ref 5–40)

## 2023-11-22 NOTE — PROGRESS NOTES
Her calcium is slightly elevated so we will recheck this at her next visit. Otherwise, labs look good and will see her at her next follow up visit

## 2023-11-27 ENCOUNTER — HOME CARE VISIT (OUTPATIENT)
Dept: HOME HEALTH SERVICES | Facility: HOME HEALTHCARE | Age: 78
End: 2023-11-27
Payer: MEDICARE

## 2023-11-27 ENCOUNTER — TELEPHONE (OUTPATIENT)
Dept: INTERNAL MEDICINE | Facility: CLINIC | Age: 78
End: 2023-11-27
Payer: MEDICARE

## 2023-11-27 VITALS
OXYGEN SATURATION: 96 % | DIASTOLIC BLOOD PRESSURE: 78 MMHG | TEMPERATURE: 98.2 F | HEART RATE: 100 BPM | SYSTOLIC BLOOD PRESSURE: 124 MMHG

## 2023-11-27 LAB — DRUGS UR: NORMAL

## 2023-11-27 PROCEDURE — G0152 HHCP-SERV OF OT,EA 15 MIN: HCPCS

## 2023-11-27 NOTE — Clinical Note
Dear Dr. Sprague,   OT guilherme performed this date, 11/27/23, with OT requesting 2 visits x 2 weeks addressing bathing I, lower body dressing, and environmental set up for increased home safety secondary to  lumbar spinal stenosis with decline in function.   Thank you,  Josselin Roy OTR/DEENA

## 2023-11-28 ENCOUNTER — HOME CARE VISIT (OUTPATIENT)
Dept: HOME HEALTH SERVICES | Facility: HOME HEALTHCARE | Age: 78
End: 2023-11-28
Payer: MEDICARE

## 2023-11-28 PROCEDURE — G0151 HHCP-SERV OF PT,EA 15 MIN: HCPCS

## 2023-11-28 NOTE — HOME HEALTH
Upon OT evaluation, pt presents with deficits in dynamic balance, activity tolerance, functional reach, and safety awareness affecting patients I with LBD and bathing.   Plan for next visit: ADLs, home safety   MEDICAL NECESSITY FOR ONGOING SKILLED THERAPY: Patient requires skilled occupational therapy for remediation of deficits to improve activities of daily living, home safety, falls prevention, monitor vital signs, including pulse oximetry, hand/ upper extremity function/range of motion/strength, therapeutic exercise, safety in home, and improve endurance and fatigue management with self care, and functional mobility with durable medical equipment as necessary

## 2023-11-29 VITALS
OXYGEN SATURATION: 97 % | TEMPERATURE: 98.6 F | SYSTOLIC BLOOD PRESSURE: 122 MMHG | HEART RATE: 88 BPM | RESPIRATION RATE: 18 BRPM | DIASTOLIC BLOOD PRESSURE: 70 MMHG

## 2023-11-29 NOTE — HOME HEALTH
Routine Visit Note:    Skill/education provided: Ther ex, gait/transfer training, fall risk/prevention education    Patient/caregiver response: Well, repeats instructions back to therapist    Plan for next visit: Ther ex, gait/transfer training    Other pertinent info: None

## 2023-12-01 ENCOUNTER — HOME CARE VISIT (OUTPATIENT)
Dept: HOME HEALTH SERVICES | Facility: HOME HEALTHCARE | Age: 78
End: 2023-12-01
Payer: MEDICARE

## 2023-12-01 PROCEDURE — G0151 HHCP-SERV OF PT,EA 15 MIN: HCPCS

## 2023-12-03 VITALS
SYSTOLIC BLOOD PRESSURE: 122 MMHG | HEART RATE: 74 BPM | TEMPERATURE: 97.5 F | DIASTOLIC BLOOD PRESSURE: 54 MMHG | OXYGEN SATURATION: 98 % | RESPIRATION RATE: 18 BRPM

## 2023-12-04 ENCOUNTER — HOME CARE VISIT (OUTPATIENT)
Dept: HOME HEALTH SERVICES | Facility: HOME HEALTHCARE | Age: 78
End: 2023-12-04
Payer: MEDICARE

## 2023-12-04 VITALS
DIASTOLIC BLOOD PRESSURE: 82 MMHG | OXYGEN SATURATION: 97 % | HEART RATE: 85 BPM | SYSTOLIC BLOOD PRESSURE: 134 MMHG | TEMPERATURE: 96.9 F

## 2023-12-04 PROCEDURE — G0152 HHCP-SERV OF OT,EA 15 MIN: HCPCS

## 2023-12-04 NOTE — HOME HEALTH
Routine Visit Note:    Skill/education provided: Ther ex, gait/transfer training    Patient/caregiver response: Well, Repeats instructions back to therapist    Plan for next visit: Ther ex, gait/transfer training    Other pertinent info: None

## 2023-12-05 ENCOUNTER — HOME CARE VISIT (OUTPATIENT)
Dept: HOME HEALTH SERVICES | Facility: HOME HEALTHCARE | Age: 78
End: 2023-12-05
Payer: MEDICARE

## 2023-12-05 PROCEDURE — G0151 HHCP-SERV OF PT,EA 15 MIN: HCPCS

## 2023-12-05 NOTE — HOME HEALTH
Routine Visit Note:    Skill/education provided: OT educated and demonstrated to patient alternate set up of bathroom in order to maximize safety and I with bathing including recommendation of different shower chair.    Patient/caregiver response: Pt agreeable to look into purchasing needed AE and is agreeable to alternate s/u of shower.    Plan for next visit: home safety, BUE HEP    Other pertinent info: N/A

## 2023-12-06 ENCOUNTER — HOME CARE VISIT (OUTPATIENT)
Dept: HOME HEALTH SERVICES | Facility: HOME HEALTHCARE | Age: 78
End: 2023-12-06
Payer: MEDICARE

## 2023-12-06 VITALS
HEART RATE: 84 BPM | SYSTOLIC BLOOD PRESSURE: 134 MMHG | OXYGEN SATURATION: 97 % | DIASTOLIC BLOOD PRESSURE: 82 MMHG | TEMPERATURE: 97.3 F

## 2023-12-06 PROCEDURE — G0152 HHCP-SERV OF OT,EA 15 MIN: HCPCS

## 2023-12-06 NOTE — HOME HEALTH
Routine Visit Note:    Skill/education provided: OT educated and demonstrated to patient the optimal and safest routine for shower transfers including shower routine with turning water on/off, drying body, and managing turning off/on water.    Patient/caregiver response: Pt able to return demo with 75% accuracy.    Plan for next visit: home safety, BUE HEP    Other pertinent info: Pt reports her  is going to purchase a new shower rug with appropriate  per OT recommendation.

## 2023-12-07 VITALS
SYSTOLIC BLOOD PRESSURE: 118 MMHG | HEART RATE: 66 BPM | RESPIRATION RATE: 18 BRPM | TEMPERATURE: 97.2 F | DIASTOLIC BLOOD PRESSURE: 55 MMHG | OXYGEN SATURATION: 97 %

## 2023-12-07 NOTE — HOME HEALTH
Routine Visit Note:    Skill/education provided: Ther ex, gait/transfer training    Patient/caregiver response: Well, repeats instructions back to therapist    Plan for next visit: Improve household mobility, transfer/gait training    Other pertinent info: Ther ex

## 2023-12-08 ENCOUNTER — HOME CARE VISIT (OUTPATIENT)
Dept: HOME HEALTH SERVICES | Facility: HOME HEALTHCARE | Age: 78
End: 2023-12-08
Payer: MEDICARE

## 2023-12-08 PROCEDURE — G0151 HHCP-SERV OF PT,EA 15 MIN: HCPCS

## 2023-12-10 VITALS
SYSTOLIC BLOOD PRESSURE: 128 MMHG | TEMPERATURE: 97.2 F | RESPIRATION RATE: 18 BRPM | HEART RATE: 74 BPM | OXYGEN SATURATION: 95 % | DIASTOLIC BLOOD PRESSURE: 64 MMHG

## 2023-12-11 ENCOUNTER — HOME CARE VISIT (OUTPATIENT)
Dept: HOME HEALTH SERVICES | Facility: HOME HEALTHCARE | Age: 78
End: 2023-12-11
Payer: MEDICARE

## 2023-12-11 VITALS
TEMPERATURE: 98.4 F | DIASTOLIC BLOOD PRESSURE: 100 MMHG | HEART RATE: 82 BPM | OXYGEN SATURATION: 95 % | SYSTOLIC BLOOD PRESSURE: 143 MMHG

## 2023-12-11 PROCEDURE — G0152 HHCP-SERV OF OT,EA 15 MIN: HCPCS

## 2023-12-11 NOTE — HOME HEALTH
Routine Visit Note:    Skill/education provided: Ther ex, gait training, transfer training    Patient/caregiver response: Well, repeats instructions back to therapist.    Plan for next visit: Prepare for DC    Other pertinent info: None

## 2023-12-12 ENCOUNTER — HOME CARE VISIT (OUTPATIENT)
Dept: HOME HEALTH SERVICES | Facility: HOME HEALTHCARE | Age: 78
End: 2023-12-12
Payer: MEDICARE

## 2023-12-12 VITALS — SYSTOLIC BLOOD PRESSURE: 138 MMHG | HEART RATE: 91 BPM | DIASTOLIC BLOOD PRESSURE: 80 MMHG | OXYGEN SATURATION: 96 %

## 2023-12-12 PROCEDURE — G0151 HHCP-SERV OF PT,EA 15 MIN: HCPCS

## 2023-12-12 NOTE — HOME HEALTH
Routine Visit Note:    Skill/education provided: OT educated and demonstrated to pt BUE HEP as well as edu on how to use phone to access HEP in order to maximize carryover and routine participation to build strength for ADLs.    Patient/caregiver response: Pt able to return demo I use of smart phone and BUE HEP with 75% accuracy. Pt reports she will complete daily and follow up next visit.    Plan for next visit: shower    Other pertinent info: N/A

## 2023-12-12 NOTE — PROGRESS NOTES
"Subjective   History of Present Illness: Kirti Santillan is a 78 y.o. female is here today for hospital follow-up.  Who was recently admitted to the hospital with falls and was found to have significant lumbar stenosis.  She was having falls as well as pain in her back and some pain into her legs.  The back pain and leg pain are chronic.  Patient underwent an epidural steroid injection in the hospital 6 weeks ago and has had lasting improvement.  Currently she has no significant back or leg pain and her falls have improved.           Previous treatment: Gabapentin, NSAID'S    Previous neurosurgery:      Previous injections:   Lumbar CHAUNCEY    The following portions of the patient's history were reviewed and updated as appropriate: allergies, current medications, past family history, past medical history, past social history, past surgical history, and problem list.    Review of Systems   Constitutional:  Negative for chills, fatigue and fever.   Respiratory:  Positive for chest tightness. Negative for shortness of breath and wheezing.    Cardiovascular:  Positive for leg swelling. Negative for chest pain and palpitations.   Musculoskeletal:  Positive for back pain. Negative for arthralgias, gait problem, joint swelling, neck pain and neck stiffness.   Neurological:  Positive for weakness (LLE) and numbness (LLE). Negative for dizziness.       Objective      Temp 98.6 °F (37 °C) (Temporal)   Ht 160 cm (63\")   Wt 76.9 kg (169 lb 9.6 oz)   LMP  (LMP Unknown)   BMI 30.04 kg/m²    Body mass index is 30.04 kg/m².  Vitals:    12/15/23 1224   PainSc:   4   PainLoc: Back           Neurologic Exam     Mental Status   Oriented to person, place, and time.     Motor Exam     Strength   Strength 5/5 throughout.     Sensory Exam   Light touch normal.       Assessment & Plan   Independent Review of Radiographic Studies:      I personally reviewed and interpreted the images from the following studies.    MRI lumbar spine: " Multilevel degenerative changes most severe at L2-3 and L3-4 where there is grade 1 spondylolisthesis and moderate to severe central stenosis.    DEXA scan: Osteoporosis    Medical Decision Making:      Kirti Santillan is a 78 y.o. female who was recently hospitalized due to falls and back and leg pain with severe degenerative changes throughout her lumbar spine including spondylolisthesis from L2-4 with moderate to severe central stenosis.  Patient got significant improvement that has lasted with her injection.  If her symptoms were to return I recommend that she repeat the injection.  She should also continue with the physical therapy.  Patient has osteoporosis, and fusion surgery would not be advisable should her symptoms become refractory to conservative measures.  If she were to follow-up she would need a flexion-extension x-ray prior to follow-up to evaluate for stability and possible decompression surgery.      Diagnoses and all orders for this visit:    1. Lumbar stenosis with neurogenic claudication (Primary)    2. Lumbar degenerative disc disease    3. Lumbar radiculopathy      No follow-ups on file.    This patient was examined wearing appropriate personal protective equipment.                      Dr. Laz Giles IV    12/15/23  12:44 EST

## 2023-12-13 NOTE — HOME HEALTH
Pt presented at the table with son and  present. She reports 4/10 pain in L LE related to sciatica.  She denies falls.   She reports she is going to MD Thursday.  Therefore, practiced entering/exiting the home and car transfers today.  She did well but after was fatigued and therefore did not tolerate HEP.  She requested to rest.    Plan for next visit: PT DC, HEP as tolerated, DC outcome measures.

## 2023-12-14 ENCOUNTER — HOSPITAL ENCOUNTER (OUTPATIENT)
Facility: HOSPITAL | Age: 78
Discharge: HOME | End: 2023-12-14
Admitting: STUDENT IN AN ORGANIZED HEALTH CARE EDUCATION/TRAINING PROGRAM
Payer: MEDICARE

## 2023-12-14 ENCOUNTER — HOME CARE VISIT (OUTPATIENT)
Dept: HOME HEALTH SERVICES | Facility: HOME HEALTHCARE | Age: 78
End: 2023-12-14
Payer: MEDICARE

## 2023-12-14 VITALS
DIASTOLIC BLOOD PRESSURE: 78 MMHG | HEART RATE: 93 BPM | SYSTOLIC BLOOD PRESSURE: 144 MMHG | OXYGEN SATURATION: 94 % | TEMPERATURE: 98.7 F

## 2023-12-14 DIAGNOSIS — E83.52 HYPERCALCEMIA: ICD-10-CM

## 2023-12-14 DIAGNOSIS — Z09 ENCOUNTER FOR FOLLOW-UP EXAMINATION AFTER COMPLETED TREATMENT FOR CONDITIONS OTHER THAN MALIGNANT NEOPLASM: ICD-10-CM

## 2023-12-14 DIAGNOSIS — Z13.820 ENCOUNTER FOR SCREENING FOR OSTEOPOROSIS: ICD-10-CM

## 2023-12-14 DIAGNOSIS — M81.6 LOCALIZED OSTEOPOROSIS, UNSPECIFIED PATHOLOGICAL FRACTURE PRESENCE: Primary | ICD-10-CM

## 2023-12-14 PROCEDURE — 77080 DXA BONE DENSITY AXIAL: CPT

## 2023-12-14 PROCEDURE — G0152 HHCP-SERV OF OT,EA 15 MIN: HCPCS

## 2023-12-15 ENCOUNTER — OFFICE VISIT (OUTPATIENT)
Dept: NEUROSURGERY | Facility: CLINIC | Age: 78
End: 2023-12-15
Payer: MEDICARE

## 2023-12-15 ENCOUNTER — HOME CARE VISIT (OUTPATIENT)
Dept: HOME HEALTH SERVICES | Facility: HOME HEALTHCARE | Age: 78
End: 2023-12-15
Payer: MEDICARE

## 2023-12-15 VITALS — WEIGHT: 169.6 LBS | BODY MASS INDEX: 30.05 KG/M2 | HEIGHT: 63 IN | TEMPERATURE: 98.6 F

## 2023-12-15 DIAGNOSIS — M54.16 LUMBAR RADICULOPATHY: ICD-10-CM

## 2023-12-15 DIAGNOSIS — M51.36 LUMBAR DEGENERATIVE DISC DISEASE: ICD-10-CM

## 2023-12-15 DIAGNOSIS — M48.062 LUMBAR STENOSIS WITH NEUROGENIC CLAUDICATION: Primary | ICD-10-CM

## 2023-12-15 NOTE — PROGRESS NOTES
Her bone scan shows osteoporosis which means very weak bones which are at increased risk for future fractures.     I'd like to have her come in to get some further labs before we start or change any medications as her calcium is already high which is abnormal. I have placed these orders and whenever she is able to she can get them and will follow up with her at her next appt

## 2023-12-18 ENCOUNTER — HOME CARE VISIT (OUTPATIENT)
Dept: HOME HEALTH SERVICES | Facility: HOME HEALTHCARE | Age: 78
End: 2023-12-18
Payer: MEDICARE

## 2023-12-18 VITALS
TEMPERATURE: 98.6 F | HEART RATE: 81 BPM | DIASTOLIC BLOOD PRESSURE: 84 MMHG | OXYGEN SATURATION: 95 % | SYSTOLIC BLOOD PRESSURE: 130 MMHG

## 2023-12-18 PROCEDURE — G0152 HHCP-SERV OF OT,EA 15 MIN: HCPCS

## 2023-12-18 NOTE — CASE COMMUNICATION
Patient missed a PT visit from [facility] on 12/15/2023    Reason: MD appt Thursday and Friday       For your records only.   As per home health protocol, MD must be notified of missed/cancelled visits; therefore the prescribed frequency was not met.

## 2023-12-18 NOTE — HOME HEALTH
Routine Visit Note:    Skill/education provided: OT edu and demonstrated to pt proper use of reacher and sock aide as well as importance of proper footwear.    Patient/caregiver response: Pt receptive to edu but frustrated with use of reacher.     Plan for next visit: home safety, LB dressing, BUE HEP    Other pertinent info: N/A

## 2023-12-20 ENCOUNTER — HOME CARE VISIT (OUTPATIENT)
Dept: HOME HEALTH SERVICES | Facility: HOME HEALTHCARE | Age: 78
End: 2023-12-20
Payer: MEDICARE

## 2023-12-20 PROCEDURE — G0151 HHCP-SERV OF PT,EA 15 MIN: HCPCS

## 2023-12-21 ENCOUNTER — HOME CARE VISIT (OUTPATIENT)
Dept: HOME HEALTH SERVICES | Facility: HOME HEALTHCARE | Age: 78
End: 2023-12-21
Payer: MEDICARE

## 2023-12-21 VITALS
TEMPERATURE: 98.6 F | HEART RATE: 83 BPM | DIASTOLIC BLOOD PRESSURE: 84 MMHG | SYSTOLIC BLOOD PRESSURE: 138 MMHG | OXYGEN SATURATION: 95 %

## 2023-12-21 PROCEDURE — G0152 HHCP-SERV OF OT,EA 15 MIN: HCPCS

## 2023-12-21 NOTE — HOME HEALTH
Routine Visit Note:    Skill/education provided: OT educated and demonstrated to pt proper form of BUE strengthening HEP and proper AE use of LBD to maximize I with task.    Patient/caregiver response: Pt able to return demo with SBA and occasional cues.    Plan for next visit: home safety, ADLs, DC    Other pertinent info: N/A

## 2023-12-25 VITALS
HEART RATE: 70 BPM | TEMPERATURE: 97.2 F | DIASTOLIC BLOOD PRESSURE: 68 MMHG | SYSTOLIC BLOOD PRESSURE: 126 MMHG | RESPIRATION RATE: 17 BRPM | OXYGEN SATURATION: 95 %

## 2023-12-27 ENCOUNTER — HOME CARE VISIT (OUTPATIENT)
Dept: HOME HEALTH SERVICES | Facility: HOME HEALTHCARE | Age: 78
End: 2023-12-27
Payer: MEDICARE

## 2023-12-27 VITALS
SYSTOLIC BLOOD PRESSURE: 130 MMHG | TEMPERATURE: 96.8 F | HEART RATE: 77 BPM | DIASTOLIC BLOOD PRESSURE: 78 MMHG | OXYGEN SATURATION: 95 %

## 2023-12-27 PROCEDURE — G0152 HHCP-SERV OF OT,EA 15 MIN: HCPCS

## 2023-12-27 NOTE — Clinical Note
Dear Dr. Sprague,     Skilled OT DC this date at pts is at max potential. All self care goals have been met and BUE HEP is understood by pt although compliance fluctuates.     Thank you,  Josselin Roy OTR/L

## 2023-12-28 ENCOUNTER — LAB (OUTPATIENT)
Facility: HOSPITAL | Age: 78
End: 2023-12-28
Payer: MEDICARE

## 2023-12-28 PROCEDURE — 83970 ASSAY OF PARATHORMONE: CPT | Performed by: STUDENT IN AN ORGANIZED HEALTH CARE EDUCATION/TRAINING PROGRAM

## 2023-12-28 PROCEDURE — 82330 ASSAY OF CALCIUM: CPT | Performed by: STUDENT IN AN ORGANIZED HEALTH CARE EDUCATION/TRAINING PROGRAM

## 2023-12-28 PROCEDURE — 82306 VITAMIN D 25 HYDROXY: CPT | Performed by: STUDENT IN AN ORGANIZED HEALTH CARE EDUCATION/TRAINING PROGRAM

## 2023-12-28 NOTE — HOME HEALTH
Routine Visit Note:    Skill/education provided: Pt educated on benefits of routine BUE HEP and routine locking of rollator brakes in order to reduce fall risk and maintain ADL I.    Patient/caregiver response: Pt receptive to edu.    Plan for next visit: N/A    Other pertinent info: N/A

## 2023-12-29 DIAGNOSIS — E21.0 PRIMARY HYPERPARATHYROIDISM: Primary | ICD-10-CM

## 2023-12-29 DIAGNOSIS — E83.52 HYPERCALCEMIA: ICD-10-CM

## 2023-12-29 DIAGNOSIS — M81.0 OSTEOPOROSIS, UNSPECIFIED OSTEOPOROSIS TYPE, UNSPECIFIED PATHOLOGICAL FRACTURE PRESENCE: ICD-10-CM

## 2024-01-22 DIAGNOSIS — G89.29 CHRONIC LOW BACK PAIN, UNSPECIFIED BACK PAIN LATERALITY, UNSPECIFIED WHETHER SCIATICA PRESENT: ICD-10-CM

## 2024-01-22 DIAGNOSIS — M54.50 CHRONIC LOW BACK PAIN, UNSPECIFIED BACK PAIN LATERALITY, UNSPECIFIED WHETHER SCIATICA PRESENT: ICD-10-CM

## 2024-01-22 RX ORDER — GABAPENTIN 100 MG/1
100 CAPSULE ORAL 2 TIMES DAILY
Qty: 60 CAPSULE | Refills: 0 | Status: SHIPPED | OUTPATIENT
Start: 2024-01-22

## 2024-02-21 ENCOUNTER — OFFICE VISIT (OUTPATIENT)
Dept: INTERNAL MEDICINE | Facility: CLINIC | Age: 79
End: 2024-02-21
Payer: MEDICARE

## 2024-02-21 VITALS
HEART RATE: 99 BPM | HEIGHT: 63 IN | WEIGHT: 168.8 LBS | DIASTOLIC BLOOD PRESSURE: 80 MMHG | OXYGEN SATURATION: 96 % | BODY MASS INDEX: 29.91 KG/M2 | TEMPERATURE: 97.5 F | SYSTOLIC BLOOD PRESSURE: 122 MMHG

## 2024-02-21 DIAGNOSIS — E21.0 PRIMARY HYPERPARATHYROIDISM: ICD-10-CM

## 2024-02-21 DIAGNOSIS — M54.16 LUMBAR RADICULOPATHY: ICD-10-CM

## 2024-02-21 DIAGNOSIS — R74.8 ELEVATED ALKALINE PHOSPHATASE LEVEL: ICD-10-CM

## 2024-02-21 DIAGNOSIS — R73.03 PRE-DIABETES: ICD-10-CM

## 2024-02-21 DIAGNOSIS — Z11.59 ENCOUNTER FOR HEPATITIS C SCREENING TEST FOR LOW RISK PATIENT: ICD-10-CM

## 2024-02-21 DIAGNOSIS — M81.0 OSTEOPOROSIS, UNSPECIFIED OSTEOPOROSIS TYPE, UNSPECIFIED PATHOLOGICAL FRACTURE PRESENCE: Primary | ICD-10-CM

## 2024-02-21 DIAGNOSIS — Z23 NEED FOR PNEUMOCOCCAL 20-VALENT CONJUGATE VACCINATION: ICD-10-CM

## 2024-02-21 NOTE — PROGRESS NOTES
"Chief Complaint  Osteoporosis and Leg Pain    Subjective        Kirti Santillan presents to [facility]  History of Present Illness    Ms. Santillan presents to clinic today for follow up of recently diagnosed osteoporosis     Since last visit, DEXA scan noted osteoporosis at the left hip with T score at -2.9. She denies being on any medication for this in past.    Her chronic back pain with radiculopathy down left leg is stable but continues to have some residual pain. Gabapentin helps, tolerating well       Review of Systems   Respiratory:  Negative for shortness of breath.    Cardiovascular:  Negative for chest pain.   Musculoskeletal:  Positive for arthralgias, back pain and gait problem.   Neurological:  Positive for weakness. Negative for syncope.        Objective   Vital Signs:  /80   Pulse 99   Temp 97.5 °F (36.4 °C) (Temporal)   Ht 160 cm (63\")   Wt 76.6 kg (168 lb 12.8 oz)   SpO2 96%   BMI 29.90 kg/m²   Estimated body mass index is 29.9 kg/m² as calculated from the following:    Height as of this encounter: 160 cm (63\").    Weight as of this encounter: 76.6 kg (168 lb 12.8 oz).               Physical Exam  Vitals reviewed.   Constitutional:       General: She is not in acute distress.     Appearance: Normal appearance. She is ill-appearing. She is not toxic-appearing.   HENT:      Head: Normocephalic and atraumatic.   Eyes:      General: No scleral icterus.        Right eye: No discharge.         Left eye: No discharge.      Conjunctiva/sclera: Conjunctivae normal.   Pulmonary:      Effort: Pulmonary effort is normal. No respiratory distress.   Abdominal:      General: Abdomen is flat. There is no distension.      Palpations: Abdomen is soft.   Musculoskeletal:         General: No swelling or deformity. Normal range of motion.   Skin:     General: Skin is warm and dry.   Neurological:      General: No focal deficit present.      Mental Status: She is alert and " oriented to person, place, and time. Mental status is at baseline.      Motor: Weakness present.      Gait: Gait abnormal (in wheelchair).   Psychiatric:         Mood and Affect: Mood normal.         Behavior: Behavior normal.        Result Review :                   Assessment and Plan   Diagnoses and all orders for this visit:    1. Osteoporosis, unspecified osteoporosis type, unspecified pathological fracture presence (Primary)  -     Comprehensive Metabolic Panel  -     Vitamin D,25-Hydroxy    2. Primary hyperparathyroidism    3. Lumbar radiculopathy    4. Elevated alkaline phosphatase level  -     Comprehensive Metabolic Panel  -     Gamma GT    5. Pre-diabetes  -     Hemoglobin A1c    6. Encounter for hepatitis C screening test for low risk patient  -     Hepatitis C Antibody    7. Need for pneumococcal 20-valent conjugate vaccination  -     Pneumococcal Conjugate Vaccine 20-Valent (PCV20)      Reviewed ENT OV for primary hyperparathyroidism     Osteoporosis is newly diagnosed after most recent DEXA scan obtained. Noted hypercalcemia 2/2 to primary hyperparathyroidism which I referred her to ENT however she declined wanting to pursue surgery. We will obtain calcium and vitamin D levels today and pending, consider starting bisphosphonate or consider referral to endocrinology    Primary hyperparathyroidism is chronic, stable. Denies any hypercalcemic sx, unclear if worsening osteoporosis. No surgery per ENT OV    Radiculopathy is chronic, stable. On a low dose of gabapentin, stated we can slowly increase as able so she will try 200mg BID from 100mg BID    Will obtain labs to repeat elevated alk phos and A1c give previous A1c noted to be 6.3%    RTC in 3mo or sooner prn, labs today          Follow Up   Return in about 3 months (around 5/21/2024).  Patient was given instructions and counseling regarding her condition or for health maintenance advice. Please see specific information pulled into the AVS if  appropriate.

## 2024-02-22 DIAGNOSIS — E21.0 PRIMARY HYPERPARATHYROIDISM: ICD-10-CM

## 2024-02-22 DIAGNOSIS — M81.0 OSTEOPOROSIS, UNSPECIFIED OSTEOPOROSIS TYPE, UNSPECIFIED PATHOLOGICAL FRACTURE PRESENCE: Primary | ICD-10-CM

## 2024-02-22 LAB
25(OH)D3+25(OH)D2 SERPL-MCNC: 8.2 NG/ML (ref 30–100)
ALBUMIN SERPL-MCNC: 4.1 G/DL (ref 3.5–5.2)
ALBUMIN/GLOB SERPL: 1.6 G/DL
ALP SERPL-CCNC: 111 U/L (ref 39–117)
ALT SERPL-CCNC: 10 U/L (ref 1–33)
AST SERPL-CCNC: 11 U/L (ref 1–32)
BILIRUB SERPL-MCNC: 0.5 MG/DL (ref 0–1.2)
BUN SERPL-MCNC: 22 MG/DL (ref 8–23)
BUN/CREAT SERPL: 30.6 (ref 7–25)
CALCIUM SERPL-MCNC: 10.7 MG/DL (ref 8.6–10.5)
CHLORIDE SERPL-SCNC: 102 MMOL/L (ref 98–107)
CO2 SERPL-SCNC: 27.1 MMOL/L (ref 22–29)
CREAT SERPL-MCNC: 0.72 MG/DL (ref 0.57–1)
EGFRCR SERPLBLD CKD-EPI 2021: 85.2 ML/MIN/1.73
GGT SERPL-CCNC: 14 U/L (ref 5–36)
GLOBULIN SER CALC-MCNC: 2.6 GM/DL
GLUCOSE SERPL-MCNC: 152 MG/DL (ref 65–99)
HBA1C MFR BLD: 6.5 % (ref 4.8–5.6)
HCV IGG SERPL QL IA: NON REACTIVE
POTASSIUM SERPL-SCNC: 4.4 MMOL/L (ref 3.5–5.2)
PROT SERPL-MCNC: 6.7 G/DL (ref 6–8.5)
SODIUM SERPL-SCNC: 139 MMOL/L (ref 136–145)

## 2024-02-22 RX ORDER — ERGOCALCIFEROL 1.25 MG/1
50000 CAPSULE ORAL WEEKLY
Qty: 5 CAPSULE | Refills: 3 | Status: SHIPPED | OUTPATIENT
Start: 2024-02-22

## 2024-03-12 ENCOUNTER — OFFICE VISIT (OUTPATIENT)
Dept: ENDOCRINOLOGY | Age: 79
End: 2024-03-12
Payer: MEDICARE

## 2024-03-12 VITALS
HEART RATE: 100 BPM | OXYGEN SATURATION: 97 % | WEIGHT: 168 LBS | HEIGHT: 63 IN | BODY MASS INDEX: 29.77 KG/M2 | DIASTOLIC BLOOD PRESSURE: 72 MMHG | SYSTOLIC BLOOD PRESSURE: 128 MMHG

## 2024-03-12 DIAGNOSIS — E21.3 HYPERPARATHYROIDISM: ICD-10-CM

## 2024-03-12 DIAGNOSIS — E55.9 VITAMIN D DEFICIENCY: ICD-10-CM

## 2024-03-12 DIAGNOSIS — M81.0 OSTEOPOROSIS, UNSPECIFIED OSTEOPOROSIS TYPE, UNSPECIFIED PATHOLOGICAL FRACTURE PRESENCE: Primary | ICD-10-CM

## 2024-03-12 PROCEDURE — 99204 OFFICE O/P NEW MOD 45 MIN: CPT | Performed by: INTERNAL MEDICINE

## 2024-03-12 PROCEDURE — 1160F RVW MEDS BY RX/DR IN RCRD: CPT | Performed by: INTERNAL MEDICINE

## 2024-03-12 PROCEDURE — 3078F DIAST BP <80 MM HG: CPT | Performed by: INTERNAL MEDICINE

## 2024-03-12 PROCEDURE — 1159F MED LIST DOCD IN RCRD: CPT | Performed by: INTERNAL MEDICINE

## 2024-03-12 PROCEDURE — 3074F SYST BP LT 130 MM HG: CPT | Performed by: INTERNAL MEDICINE

## 2024-03-12 NOTE — PROGRESS NOTES
Referring provider: Deangelo Sprague MD     Chief complaint/Reason for consult: osteoporosis    HPI:   - 78 year old female here for osteoporosis  - Prior fragility fractures: none  - Prior/current treatments for osteoporosis: none  - Risk factors for osteoporosis/bone loss: hyperparathyroidism  - Family history of osteoporosis: none  - Vitamin D and calcium intake: is on 50,000 IU vitamin D2 weekly  - Physical activity: walks with walker  - Last DXA: 12/2023 showing a most severe T-score of -2.9 in the left hip  - Denies kidney stones      The following portions of the patient's history were reviewed and updated as appropriate: allergies, current medications, past family history, past medical history, past social history, past surgical history, and problem list.    Objective     Vitals:    03/12/24 1349   BP: 128/72   Pulse: 100   SpO2: 97%        Physical Exam  Vitals reviewed.   Constitutional:       Appearance: Normal appearance.   HENT:      Head: Normocephalic and atraumatic.   Eyes:      General: No scleral icterus.  Pulmonary:      Effort: Pulmonary effort is normal. No respiratory distress.   Neurological:      Mental Status: She is alert.      Gait: Gait abnormal.   Psychiatric:         Mood and Affect: Mood normal.         Behavior: Behavior normal.         Thought Content: Thought content normal.         Judgment: Judgment normal.       Labs/Imaging:  DXA 12/2023 showing a most severe T-score of -2.9 in the left hip    Assessment & Plan   Osteoporosis  Hyperparathyroidism  3.  Vitamin D deficiency  - She is on 50,000 IU vitamin D2 weekly  - I will recheck her vitamin D, PTH, calcium in 2 months  - Depending on what her labs show I will likely start alendronate  - Her PTH may be elevated due to vitamin D deficiency but she also may have primary hyperparathyroidism as well given calcium elevation    - Return to clinic in 1 year

## 2024-03-18 DIAGNOSIS — G89.29 CHRONIC LOW BACK PAIN, UNSPECIFIED BACK PAIN LATERALITY, UNSPECIFIED WHETHER SCIATICA PRESENT: ICD-10-CM

## 2024-03-18 DIAGNOSIS — M54.50 CHRONIC LOW BACK PAIN, UNSPECIFIED BACK PAIN LATERALITY, UNSPECIFIED WHETHER SCIATICA PRESENT: ICD-10-CM

## 2024-03-18 RX ORDER — GABAPENTIN 100 MG/1
100 CAPSULE ORAL 2 TIMES DAILY
Qty: 180 CAPSULE | Refills: 0 | Status: SHIPPED | OUTPATIENT
Start: 2024-03-18

## 2024-03-21 ENCOUNTER — NURSE TRIAGE (OUTPATIENT)
Dept: CALL CENTER | Facility: HOSPITAL | Age: 79
End: 2024-03-21
Payer: MEDICARE

## 2024-03-21 NOTE — TELEPHONE ENCOUNTER
Returned call to patient, she describes sharp pain, in the center of her chest. She states that she first became aware of the pain while stretching to grab something. She states the pain felt like she had pulled a muscle. No dyspnea, no diaphoresis, no palpitations, non-pleuritic. Appears consistent with MSK injury. Recommend patient make appointment for evaluation with her PCP tomorrow, however should she develop worsened pain, syncope, dyspnea, or escalation in symptoms, should go to the er for urgent evaluation. She expresses her understanding.

## 2024-03-21 NOTE — TELEPHONE ENCOUNTER
"Pain in her back and chest for the last 3 days but its getting worse. It felt like she pulled something to start with. It will go away. Its a sharp pain that starts in her chest and goes into her back.   Triage completed and patient advised to go to the ED now. She said that she does not want to go until she speaks to Dr Nesbitt.  I did a warm transfer to  the office.   Reason for Disposition   [1] Chest pain (or \"angina\") comes and goes AND [2] is happening more often (increasing in frequency) or getting worse (increasing in severity)  (Exception: Chest pains that last only a few seconds.)    Additional Information   Negative: SEVERE difficulty breathing (e.g., struggling for each breath, speaks in single words)   Negative: Difficult to awaken or acting confused (e.g., disoriented, slurred speech)   Negative: Shock suspected (e.g., cold/pale/clammy skin, too weak to stand, low BP, rapid pulse)   Negative: Passed out (i.e., lost consciousness, collapsed and was not responding)   Negative: [1] Chest pain lasts > 5 minutes AND [2] age > 44   Negative: [1] Chest pain lasts > 5 minutes AND [2] age > 30 AND [3] one or more cardiac risk factors (e.g., diabetes, high blood pressure, high cholesterol, smoker, or strong family history of heart disease)   Negative: [1] Chest pain lasts > 5 minutes AND [2] history of heart disease (i.e., angina, heart attack, heart failure, bypass surgery, takes nitroglycerin)   Negative: [1] Chest pain lasts > 5 minutes AND [2] described as crushing, pressure-like, or heavy   Negative: Heart beating < 50 beats per minute OR > 140 beats per minute   Negative: Visible sweat on face or sweat dripping down face   Negative: Sounds like a life-threatening emergency to the triager   Negative: Followed a chest injury   Negative: SEVERE chest pain    Answer Assessment - Initial Assessment Questions  1. LOCATION: \"Where does it hurt?\"        Center of her chest   2. RADIATION: \"Does the pain go " "anywhere else?\" (e.g., into neck, jaw, arms, back)  Back   3. ONSET: \"When did the chest pain begin?\" (Minutes, hours or days)       3 days  4. PATTERN: \"Does the pain come and go, or has it been constant since it started?\"  \"Does it get worse with exertion?\"       Comes and goes   5. DURATION: \"How long does it last\" (e.g., seconds, minutes, hours)      Depends on what she is doing. Laying still it goes away quickly, but if she is up doing something it continues to hurt.   6. SEVERITY: \"How bad is the pain?\"  (e.g., Scale 1-10; mild, moderate, or severe)     - MILD (1-3): doesn't interfere with normal activities      - MODERATE (4-7): interferes with normal activities or awakens from sleep     - SEVERE (8-10): excruciating pain, unable to do any normal activities        10  7. CARDIAC RISK FACTORS: \"Do you have any history of heart problems or risk factors for heart disease?\" (e.g., angina, prior heart attack; diabetes, high blood pressure, high cholesterol, smoker, or strong family history of heart disease)      Previous smoker  8. PULMONARY RISK FACTORS: \"Do you have any history of lung disease?\"  (e.g., blood clots in lung, asthma, emphysema, birth control pills)      No   9. CAUSE: \"What do you think is causing the chest pain?\"     No   10. OTHER SYMPTOMS: \"Do you have any other symptoms?\" (e.g., dizziness, nausea, vomiting, sweating, fever, difficulty breathing, cough)        No   11. PREGNANCY: \"Is there any chance you are pregnant?\" \"When was your last menstrual period?\"       No    Protocols used: Chest Pain-ADULT-AH    "

## 2024-03-25 ENCOUNTER — OFFICE VISIT (OUTPATIENT)
Dept: INTERNAL MEDICINE | Facility: CLINIC | Age: 79
End: 2024-03-25
Payer: MEDICARE

## 2024-03-25 VITALS
TEMPERATURE: 96.8 F | BODY MASS INDEX: 28.35 KG/M2 | SYSTOLIC BLOOD PRESSURE: 112 MMHG | HEART RATE: 93 BPM | DIASTOLIC BLOOD PRESSURE: 82 MMHG | HEIGHT: 63 IN | OXYGEN SATURATION: 98 % | WEIGHT: 160 LBS

## 2024-03-25 DIAGNOSIS — G89.29 CHRONIC LOW BACK PAIN, UNSPECIFIED BACK PAIN LATERALITY, UNSPECIFIED WHETHER SCIATICA PRESENT: ICD-10-CM

## 2024-03-25 DIAGNOSIS — M48.061 SPINAL STENOSIS OF LUMBAR REGION, UNSPECIFIED WHETHER NEUROGENIC CLAUDICATION PRESENT: ICD-10-CM

## 2024-03-25 DIAGNOSIS — M54.50 CHRONIC LOW BACK PAIN, UNSPECIFIED BACK PAIN LATERALITY, UNSPECIFIED WHETHER SCIATICA PRESENT: ICD-10-CM

## 2024-03-25 DIAGNOSIS — R07.9 CHEST PAIN, UNSPECIFIED TYPE: Primary | ICD-10-CM

## 2024-03-25 PROCEDURE — 3079F DIAST BP 80-89 MM HG: CPT | Performed by: STUDENT IN AN ORGANIZED HEALTH CARE EDUCATION/TRAINING PROGRAM

## 2024-03-25 PROCEDURE — 93000 ELECTROCARDIOGRAM COMPLETE: CPT | Performed by: STUDENT IN AN ORGANIZED HEALTH CARE EDUCATION/TRAINING PROGRAM

## 2024-03-25 PROCEDURE — 1160F RVW MEDS BY RX/DR IN RCRD: CPT | Performed by: STUDENT IN AN ORGANIZED HEALTH CARE EDUCATION/TRAINING PROGRAM

## 2024-03-25 PROCEDURE — 1159F MED LIST DOCD IN RCRD: CPT | Performed by: STUDENT IN AN ORGANIZED HEALTH CARE EDUCATION/TRAINING PROGRAM

## 2024-03-25 PROCEDURE — 99213 OFFICE O/P EST LOW 20 MIN: CPT | Performed by: STUDENT IN AN ORGANIZED HEALTH CARE EDUCATION/TRAINING PROGRAM

## 2024-03-25 PROCEDURE — 3074F SYST BP LT 130 MM HG: CPT | Performed by: STUDENT IN AN ORGANIZED HEALTH CARE EDUCATION/TRAINING PROGRAM

## 2024-03-25 NOTE — PROGRESS NOTES
"Chief Complaint  Back Pain and Chest Pain    Subjective        Kirti Santillan presents to [facility]  History of Present Illness    Ms. Santillan presents to clinic today with complaints of chest pain and persistent chronic back pain.    She reports about 3 weeks ago she started to have episodes of brief which sharp chest pain that occurs predominantly when going from sitting to standing.  She does not necessarily notice it with any exertion however she is limited in her functional status and functional capacity due to generalized weakness and chronic debility from back pain.  She denies any shortness of breath or palpitations and reports that she has been taking Omega XL for pain as she saw this on TV.    She also reports her back pain is persistent and interested in establishing with pain medicine for epidural which she received while in hospital        Review of Systems   Respiratory:  Negative for shortness of breath.    Cardiovascular:  Positive for chest pain. Negative for palpitations.   Musculoskeletal:  Positive for back pain and gait problem.   Neurological:  Negative for light-headedness and numbness.     Objective   Vital Signs:  /82   Pulse 93   Temp 96.8 °F (36 °C) (Temporal)   Ht 159.9 cm (62.97\")   Wt 72.6 kg (160 lb)   SpO2 98%   BMI 28.37 kg/m²   Estimated body mass index is 28.37 kg/m² as calculated from the following:    Height as of this encounter: 159.9 cm (62.97\").    Weight as of this encounter: 72.6 kg (160 lb).               Physical Exam  Vitals reviewed.   Constitutional:       General: She is not in acute distress.     Appearance: Normal appearance. She is not toxic-appearing.   HENT:      Head: Normocephalic and atraumatic.   Eyes:      General: No scleral icterus.     Conjunctiva/sclera: Conjunctivae normal.   Cardiovascular:      Rate and Rhythm: Normal rate and regular rhythm.      Heart sounds: Normal heart sounds. No murmur " heard.  Pulmonary:      Effort: Pulmonary effort is normal. No respiratory distress.      Breath sounds: Normal breath sounds. No rales.   Musculoskeletal:      Lumbar back: Decreased range of motion. Positive right straight leg raise test and positive left straight leg raise test. Scoliosis present.   Skin:     General: Skin is warm and dry.   Neurological:      Mental Status: She is alert and oriented to person, place, and time. Mental status is at baseline.      Motor: Weakness present.      Gait: Gait abnormal.   Psychiatric:         Mood and Affect: Mood normal.         Behavior: Behavior normal.        Result Review :              ECG 12 Lead    Date/Time: 3/25/2024 12:50 PM  Performed by: Deangelo Sprague MD    Authorized by: Deangelo Sprague MD  Comparison: compared with previous ECG from 10/31/2023  Rhythm: sinus rhythm  Ectopy: multifocal PVCs  Rate: normal  Conduction: conduction normal  Q waves: II and aVF    T Waves: T waves normal  T flattening: I and aVL  QRS axis: normal  Other findings: non-specific ST-T wave changes    Clinical impression: abnormal EKG            Assessment and Plan   Diagnoses and all orders for this visit:    1. Chest pain, unspecified type (Primary)  -     Stress Test With Myocardial Perfusion One Day; Future  -     ECG 12 Lead    2. Spinal stenosis of lumbar region, unspecified whether neurogenic claudication present  -     Ambulatory Referral to Pain Management    3. Chronic low back pain, unspecified back pain laterality, unspecified whether sciatica present  -     Ambulatory Referral to Pain Management      3 week history of brief episodic chest pain, associated with sitting/standing. It does not sound consistent with typical chest pain or angina however she unfortunately is debilitated and does not have functional capacity to do much activity/exercise. ECG obtained today shows NSR with ectopic PVC's and some q waves in aVF however these appear to be present from ECG obtained  in October 2023 after review. Will workup further with stress test to r/o any ischemia as etiology. This very well also could be referred pain from  her multilevel degenerative disc disease, as review of CT scan done in Oct of spine notes disease involving thoracic spine as well.    Will refer to pain management given her persistent back pain, she receievd epidural during hospitalization at this time and suspect she would benefit from further epidurals     RTC as previously scheduled          Follow Up   No follow-ups on file.  Patient was given instructions and counseling regarding her condition or for health maintenance advice. Please see specific information pulled into the AVS if appropriate.

## 2024-04-05 NOTE — PROGRESS NOTES
The patient has a pain history of the following:  Lumbar stenosis  Chronic low back pain    Previous interventions that the patient has received include:   L4-5 Epidural steroid injection 11/2/23    Pain medications include:  CBD  Gabapentin  Ibuprofen    Other conservative modalities which the patient reports using include:  Physical Therapy: yes  Chiropractor: no  Massage Therapy: no  TENS: no  Neck or back surgery: no  Past pain management: yes    Past Significant Surgical History:  None    HPI:       CHIEF COMPLAINT: Back Pain    Kirti Santillan is a 78 y.o. female referred here by Deangelo Sprague MD. Kirti Santillan presents to the office for evaluation and treatment of Back Pain      History of Present Illness  Onset:  A few years ago  Inciting Event:  Nothing in particular  Location:  Low back  Pain: Pain described as numbness, shooting, and tingling. Located in the low back and does radiate into the left lower extremity into the anterior and lateral thigh and into the lateral calf, but not into the foot.  Severity:  Pain rated as a 9 /10.  Apportions pain as 90%  back pain and 10% extremity pain.  Symptoms have been constant.  Exacerbation:  Sitting, standing, walking, etc.   Alleviation:  Gabapentin and Ibuprofen, CBD.  Epidural injection helped for several months.   Associated Symptoms:   She denies any new onset of bowel or bladder weakness, significant leg weakness or saddle anesthesia. Admits to balance problems or lower extremity incoordination.  Ambulates: With a rolling walker.   Limitations: This pain limits the patient from being active and walking, shopping.   Goals: Functional goals include ability to do the above.     [place] 89    Her  is with her today to help report history.        PEG Assessment   What number best describes your pain on average in the past week?8  What number best describes how, during the past week, pain has interfered with your enjoyment of life?10  What number best  describes how, during the past week, pain has interfered with your general activity?  10        Current Outpatient Medications:     amLODIPine (NORVASC) 5 MG tablet, Take 1 tablet by mouth Daily. (Patient taking differently: Take 1 tablet by mouth Daily.), Disp: 30 tablet, Rfl: 0    CBD (cannabidiol) oral oil, Take 0.5 mL by mouth Daily. 2400 mg, 40 mg per mL  Indications: pain, Disp: , Rfl:     gabapentin (NEURONTIN) 100 MG capsule, Take 1 capsule by mouth 2 (Two) Times a Day., Disp: , Rfl:     gabapentin (NEURONTIN) 100 MG capsule, TAKE 1 CAPSULE BY MOUTH TWICE A DAY, Disp: 180 capsule, Rfl: 0    Ibuprofen (Advil) 200 MG capsule, Take 800 mg by mouth Daily. Indications: Backache, pain, Disp: , Rfl:     loratadine (CLARITIN) 10 MG tablet, Take 1 tablet by mouth Daily. 1/2 pill 5mg   Indications: Hayfever (Patient taking differently: Take 1 tablet by mouth Daily. 1/2 pill 5mg), Disp: 90 tablet, Rfl: 3    NON FORMULARY, OMEGA XL, Disp: , Rfl:     vitamin D (ERGOCALCIFEROL) 1.25 MG (33055 UT) capsule capsule, Take 1 capsule by mouth 1 (One) Time Per Week., Disp: 5 capsule, Rfl: 3    The following portions of the patient's history were reviewed and updated as appropriate: allergies, current medications, past family history, past medical history, past social history, past surgical history, and problem list.      REVIEW OF PERTINENT MEDICAL DATA    11/1/23 MRI OF THE LUMBAR SPINE WITHOUT CONTRAST     CLINICAL HISTORY: Back pain and leg pain.     TECHNIQUE: MRI of the lumbar spine was obtained with sagittal T1,  proton-density, and T2-weighted images. Additionally, there are axial T1  and T2-weighted images through the lumbar spine.     COMPARISON: No previous MRIs of the lumbar spine are available for  comparison.     FINDINGS:     There is transitional anatomy at the level of the lumbosacral junction  with partial sacralization of the L5 vertebral body. Please take careful  note of this enumeration system at the time of  any potential  intervention     The conus medullaris terminates at the level of the lower body of L2 and  has normal signal intensity. The visualized distal thoracic spinal cord  is unremarkable in appearance.     There is mild dextroconvex scoliotic curvature of the lumbar spine with  its apex centered at L3.     At T11-12, there is a small right central disc protrusion minimally  indenting the ventral subarachnoid space. There is mild right foraminal  narrowing secondary to bulging disc material and loss of foraminal  height.     At T12-L1, there are advanced degenerative disc changes seen along the  right side of the disc space with adjacent degenerative endplate marrow  edema. There is mild left and moderate right foraminal narrowing  secondary to loss of foraminal height and bulging disc material. There  is mild central canal stenosis secondary to bulging disc material and  facet arthropathy.     At L1-2, there is mild central canal stenosis secondary to bulging disc  material eccentric to the left in addition to ligamentum flavum  thickening and mild facet arthropathy. There is mild left foraminal  narrowing secondary to bulging disc material.     At L2-3, there is degenerative anterior spondylolisthesis of L2 to on L3  by approximately 5 mm. There is severe central canal stenosis secondary  to unroofed disc material, ligamentum flavum thickening, and facet  hypertrophic change. There is mild to moderate right and moderate to  severe left foraminal narrowing secondary to unroofed disc material and  facet hypertrophic change.     At L3-4, there is moderate to severe central canal stenosis secondary to  the anterior spondylolisthesis of L3 on L4 by approximately 5 to 6 mm,  ligamentum flavum thickening, and severe facet hypertrophic change.  Unroofed bulging disc material and facet hypertrophy results in a mild  degree of bilateral foraminal narrowing.     At L4-5, there is a disc bulge eccentric to the left  which mildly  indents the ventral subarachnoid space. There is moderate right and mild  to moderate left foraminal narrowing secondary to bulging disc material  and facet hypertrophy.     At L5-S1, there is no significant degree of canal or foraminal stenosis.     Incidental note is made of an abdominal aortic aneurysm which measures  up to approximately 4.5 cm in maximal diameter. Please refer to the  recent CT scan of the abdomen and pelvis with regards to further details  of this aneurysm. Incidental note is made of a hyperplastic appearance  of the adrenal glands     IMPRESSION:     There is transitional anatomy at the level of the lumbosacral junction  with a transitional vertebral body enumerated as a relatively sacralized  L5 segment for the purposes of this report. Please take careful note of  this enumeration system at the time of any potential intervention.     There are multilevel degenerative phenomena appreciated within the  lumbar spine that have been discussed in detail above. There is grade 1  degenerative anterior spondylolisthesis of L2 on L3 and L3 on L4. There  is a mild degree of dextroconvex scoliotic curvature of the lumbar spine  with its apex centered at L3.     There is a severe degree of central canal stenosis at L2-3 and a  moderate to severe degree of central canal stenosis at L3-4. These are  the levels of the most prominent canal stenosis within the lumbar spine.  Multilevel foraminal stenotic changes are additionally appreciated and  the most prominent foraminal stenosis is seen within the left L2-3 and  the right L4-5 neural foramina where there is moderate to severe left  L2-3 and moderate left L4-5 foraminal stenosis.     Incidental note is made of a 4.5 cm abdominal aortic aneurysm.  Incidental note is also made of a hyperplastic appearance of the adrenal  glands.     This report was finalized on 11/1/2023 7:45 PM by Dr. Sohail Guillermo M.D  on Workstation: [id number]       2/21/24  "Creatinine 0.72    11/7/23 Platelets 168 (10*3)    Review of Systems   Constitutional:  Negative for chills and fever.   Respiratory:  Negative for cough and shortness of breath.    Gastrointestinal:  Negative for abdominal pain, constipation and diarrhea.   Genitourinary:  Negative for difficulty urinating and dysuria.   Musculoskeletal:  Positive for back pain and gait problem (ambulates with a walker).   Neurological:  Positive for weakness (bilateral leg) and numbness (left hand). Negative for dizziness and light-headedness.   Psychiatric/Behavioral:  Negative for agitation.      I have reviewed and confirmed the accuracy of the ROS as documented by the MA/LPN/RN Nikole Baig MD      Vitals:    04/11/24 1024   BP: 130/78   BP Location: Left arm   Patient Position: Sitting   Pulse: 83   Temp: 96.8 °F (36 °C)   TempSrc: Temporal   SpO2: 96%   Weight: 72.6 kg (160 lb)   Height: 159.9 cm (62.97\")   PainSc:   9   PainLoc: Back         Objective   Physical Exam  Constitutional:       General: She is not in acute distress.  Pulmonary:      Effort: Pulmonary effort is normal. No respiratory distress.   Musculoskeletal:      Comments: Ambulation: With a rolling walker, difficulty standing from seated  Lumbar Exam:  Appearance: Scoliotic curve absent and scarring absent  Palpated over lumbosacral paravertebral regions and transverse processes with positive tenderness appreciated, Bilateral.   Sacroiliac joints are tender, Bilateral.  Trochanteric bursa are tender, Bilateral.  Straight leg raise is positive radiculopathy, Left.  Facet loading is positive for pain, Bilateral.  Paraspinal/adjacent lumbar musculature are tender to palpation, Bilateral.   Skin:     General: Skin is warm and dry.   Neurological:      Mental Status: She is alert.   Psychiatric:         Mood and Affect: Mood normal.         Thought Content: Thought content normal.         Assessment & Plan   Diagnoses and all orders for this visit:    1. " Lumbar stenosis with neurogenic claudication (Primary)  -     Case Request    2. Lumbar radiculopathy  -     Case Request    3. Displacement of lumbar intervertebral disc without myelopathy  -     Case Request    4. Spondylolisthesis of lumbar region    5. Lumbosacral spondylosis without myelopathy          - Pertinent labs reviewed.   - Pertinent imaging reviewed.   - Will schedule for L4-5 Epidural steroid injection, left of midline if possible.  Risks discussed including but not limited to bleeding, bruising, infection, damage to surrounding structures, headache, and rare things such as being paralyzed, seizure, stroke, heart attack and death.  The risk of steroid medications include but are not limited to immunosuppression, which can increase the risk of yuliana an infectious disease as well as decrease the immune response to a vaccine.    - May benefit from lumbar facet interventions in the future.   - Could have a component of left meralgia paresthetica (consider if no relief from epidural).   - Kirti Santillan reports a pain score of 9.  Given her pain assessment as noted, treatment options were discussed and the following options were decided upon as a follow-up plan to address the patient's pain: steroid injections.  - Patient screened positive for depression based on a PHQ-9 score of 11 on 11/21/2023. Follow-up recommendations include: PCP managing depression.    --- Follow-up for L4-5 Epidural steroid injection and office visit 4 weeks following.            Nikole Baig MD  Pain Management    EMR Dragon/Transcription disclaimer:   Much of this encounter note is an electronic transcription/translation of spoken language to printed text. The electronic translation of spoken language may permit erroneous, or at times, nonsensical words or phrases to be inadvertently transcribed; Although I have reviewed the note for such errors, some may still exist.

## 2024-04-10 ENCOUNTER — HOSPITAL ENCOUNTER (OUTPATIENT)
Dept: NUCLEAR MEDICINE | Facility: HOSPITAL | Age: 79
Discharge: HOME | End: 2024-04-10
Payer: MEDICARE

## 2024-04-10 DIAGNOSIS — R07.9 CHEST PAIN, UNSPECIFIED TYPE: ICD-10-CM

## 2024-04-10 DIAGNOSIS — R94.39 ABNORMAL NUCLEAR STRESS TEST: Primary | ICD-10-CM

## 2024-04-10 LAB
BH CV REST NUCLEAR ISOTOPE DOSE: 11.6 MCI
BH CV STRESS COMMENTS STAGE 1: NORMAL
BH CV STRESS DOSE REGADENOSON STAGE 1: 0.4
BH CV STRESS DURATION MIN STAGE 1: 0
BH CV STRESS DURATION SEC STAGE 1: 10
BH CV STRESS NUCLEAR ISOTOPE DOSE: 30.4 MCI
BH CV STRESS PROTOCOL 1: NORMAL
BH CV STRESS RECOVERY BP: NORMAL MMHG
BH CV STRESS RECOVERY HR: 81 BPM
BH CV STRESS STAGE 1: 1
LV EF NUC BP: 50 %
MAXIMAL PREDICTED HEART RATE: 141 BPM
PERCENT MAX PREDICTED HR: 62.41 %
STRESS BASELINE BP: NORMAL MMHG
STRESS BASELINE HR: 73 BPM
STRESS PERCENT HR: 73 %
STRESS POST PEAK BP: NORMAL MMHG
STRESS POST PEAK HR: 88 BPM
STRESS TARGET HR: 120 BPM

## 2024-04-10 PROCEDURE — A9500 TC99M SESTAMIBI: HCPCS | Performed by: STUDENT IN AN ORGANIZED HEALTH CARE EDUCATION/TRAINING PROGRAM

## 2024-04-10 PROCEDURE — 93017 CV STRESS TEST TRACING ONLY: CPT

## 2024-04-10 PROCEDURE — 25010000002 REGADENOSON 0.4 MG/5ML SOLUTION: Performed by: STUDENT IN AN ORGANIZED HEALTH CARE EDUCATION/TRAINING PROGRAM

## 2024-04-10 PROCEDURE — 78452 HT MUSCLE IMAGE SPECT MULT: CPT

## 2024-04-10 PROCEDURE — 0 TECHNETIUM SESTAMIBI: Performed by: STUDENT IN AN ORGANIZED HEALTH CARE EDUCATION/TRAINING PROGRAM

## 2024-04-10 RX ORDER — REGADENOSON 0.08 MG/ML
0.4 INJECTION, SOLUTION INTRAVENOUS
Status: COMPLETED | OUTPATIENT
Start: 2024-04-10 | End: 2024-04-10

## 2024-04-10 RX ADMIN — TECHNETIUM TC 99M SESTAMIBI 1 DOSE: 1 INJECTION INTRAVENOUS at 07:30

## 2024-04-10 RX ADMIN — TECHNETIUM TC 99M SESTAMIBI 1 DOSE: 1 INJECTION INTRAVENOUS at 09:09

## 2024-04-10 RX ADMIN — REGADENOSON 0.4 MG: 0.08 INJECTION, SOLUTION INTRAVENOUS at 09:09

## 2024-04-11 ENCOUNTER — PREP FOR SURGERY (OUTPATIENT)
Dept: SURGERY | Facility: SURGERY CENTER | Age: 79
End: 2024-04-11
Payer: MEDICARE

## 2024-04-11 ENCOUNTER — TRANSCRIBE ORDERS (OUTPATIENT)
Dept: SURGERY | Facility: SURGERY CENTER | Age: 79
End: 2024-04-11
Payer: MEDICARE

## 2024-04-11 ENCOUNTER — OFFICE VISIT (OUTPATIENT)
Dept: PAIN MEDICINE | Facility: CLINIC | Age: 79
End: 2024-04-11
Payer: MEDICARE

## 2024-04-11 VITALS
HEART RATE: 83 BPM | SYSTOLIC BLOOD PRESSURE: 130 MMHG | OXYGEN SATURATION: 96 % | TEMPERATURE: 96.8 F | DIASTOLIC BLOOD PRESSURE: 78 MMHG | HEIGHT: 63 IN | BODY MASS INDEX: 28.35 KG/M2 | WEIGHT: 160 LBS

## 2024-04-11 DIAGNOSIS — M43.16 SPONDYLOLISTHESIS OF LUMBAR REGION: ICD-10-CM

## 2024-04-11 DIAGNOSIS — Z41.9 SURGERY, ELECTIVE: Primary | ICD-10-CM

## 2024-04-11 DIAGNOSIS — M47.817 LUMBOSACRAL SPONDYLOSIS WITHOUT MYELOPATHY: ICD-10-CM

## 2024-04-11 DIAGNOSIS — M54.16 LUMBAR RADICULOPATHY: ICD-10-CM

## 2024-04-11 DIAGNOSIS — M51.26 DISPLACEMENT OF LUMBAR INTERVERTEBRAL DISC WITHOUT MYELOPATHY: ICD-10-CM

## 2024-04-11 DIAGNOSIS — M48.062 LUMBAR STENOSIS WITH NEUROGENIC CLAUDICATION: Primary | ICD-10-CM

## 2024-04-11 NOTE — PATIENT INSTRUCTIONS
What to expect if we're setting up an injection/procedure    - I have placed the order today, we'll start speaking to your insurance for authorization (this can sometimes take a few weeks).   - You should be scheduled for your procedure before you leave the office.  If you were not, please call our office to schedule.   - If you have any symptoms of an infection or of COVID, please reschedule your procedure.   - Your procedure will be performed in the surgery center in this building.  It is located in the basement level.  You may drive around to the back side of the building for easy access.  - LIGHT Intravenous (IV) sedation or anxiety medication is offered for some procedures: you will NOT be put to sleep.  If you plan to have sedation, do not eat or drink anything on the day of your procedure.   - Most procedures require having someone drive you.  Please make sure you arrange a  unless told otherwise.   - If you take a blood thinner and you were not instructed whether to continue or hold it, please contact us with any questions.

## 2024-04-23 ENCOUNTER — OFFICE VISIT (OUTPATIENT)
Dept: CARDIOLOGY | Facility: CLINIC | Age: 79
End: 2024-04-23
Payer: MEDICARE

## 2024-04-23 VITALS
SYSTOLIC BLOOD PRESSURE: 134 MMHG | WEIGHT: 160 LBS | DIASTOLIC BLOOD PRESSURE: 80 MMHG | BODY MASS INDEX: 28.35 KG/M2 | HEIGHT: 63 IN | HEART RATE: 85 BPM | OXYGEN SATURATION: 94 %

## 2024-04-23 DIAGNOSIS — I71.40 ABDOMINAL ANEURYSM: ICD-10-CM

## 2024-04-23 DIAGNOSIS — I10 PRIMARY HYPERTENSION: Primary | ICD-10-CM

## 2024-04-23 DIAGNOSIS — R73.03 PREDIABETES: ICD-10-CM

## 2024-04-23 DIAGNOSIS — R94.39 ABNORMAL NUCLEAR STRESS TEST: ICD-10-CM

## 2024-04-23 DIAGNOSIS — M48.061 SPINAL STENOSIS OF LUMBAR REGION, UNSPECIFIED WHETHER NEUROGENIC CLAUDICATION PRESENT: ICD-10-CM

## 2024-04-23 PROCEDURE — 99204 OFFICE O/P NEW MOD 45 MIN: CPT | Performed by: INTERNAL MEDICINE

## 2024-04-23 PROCEDURE — 3079F DIAST BP 80-89 MM HG: CPT | Performed by: INTERNAL MEDICINE

## 2024-04-23 PROCEDURE — 3075F SYST BP GE 130 - 139MM HG: CPT | Performed by: INTERNAL MEDICINE

## 2024-04-23 PROCEDURE — 1160F RVW MEDS BY RX/DR IN RCRD: CPT | Performed by: INTERNAL MEDICINE

## 2024-04-23 PROCEDURE — 1159F MED LIST DOCD IN RCRD: CPT | Performed by: INTERNAL MEDICINE

## 2024-04-23 PROCEDURE — 93000 ELECTROCARDIOGRAM COMPLETE: CPT | Performed by: INTERNAL MEDICINE

## 2024-04-23 RX ORDER — ATORVASTATIN CALCIUM 10 MG/1
10 TABLET, FILM COATED ORAL DAILY
Qty: 90 TABLET | Refills: 3 | Status: SHIPPED | OUTPATIENT
Start: 2024-04-23 | End: 2024-07-22

## 2024-04-23 RX ORDER — ASPIRIN 81 MG/1
81 TABLET ORAL DAILY
Qty: 90 TABLET | Refills: 3 | Status: SHIPPED | OUTPATIENT
Start: 2024-04-23 | End: 2024-07-22

## 2024-04-23 NOTE — PROGRESS NOTES
CARDIOLOGY    Tang Cyr MD    ENCOUNTER DATE:  04/23/2024    Kirti Santillan / 78 y.o. / female        CHIEF COMPLAINT / REASON FOR OFFICE VISIT     Abnormal nuclear stress test      HISTORY OF PRESENT ILLNESS       HPI    Kirti Santillan is a 78 y.o. female with hypertension, spinal stenosis, overweight who presents to establish care for abnormal nuclear med stress.    Nuclear SPECT this month 4/2024 showed a moderate-sized reversible perfusion defect in the inferior and inferolateral wall.    Accompanied by  at the visit today who contributed to medical history. Poor historian. Today, patient has no acute complaints. Reports 2 weeks of chest pain that started about 1 month ago, which she describes as squeezing, 4/10, episodic, and has completely resolved for 1-2 weeks. Pt is mostly non-ambulatory and  pushes her on a wheeled walker. Denies dyspnea on exertion, palpitation, leg edema, orthopnea, or PND. Pt reports occasional dizziness while having stomach pain, denies syncope, bleeding, or unexpected falls. Does not recall having had a heart attack or stroke. Former smoker, 1 ppd x 30 years and quit around age 49. Denies alcohol or substance.    REVIEW OF SYSTEMS     Review of Systems   Constitutional: Negative for weight loss.   Cardiovascular:  Negative for chest pain, dyspnea on exertion, leg swelling, orthopnea, palpitations, paroxysmal nocturnal dyspnea and syncope.   Respiratory:  Negative for shortness of breath.    Hematologic/Lymphatic: Negative for bleeding problem.   Musculoskeletal:  Negative for falls.   Gastrointestinal:  Negative for hematochezia and melena.   Genitourinary:  Negative for hematuria.   Neurological:  Positive for loss of balance and weakness. Negative for dizziness and light-headedness.   Psychiatric/Behavioral:  Negative for altered mental status.            MEDICATIONS      Outpatient Encounter Medications as of 4/23/2024   Medication Sig Dispense Refill    CBD  "(cannabidiol) oral oil Take 0.5 mL by mouth Daily. 2400 mg, 40 mg per mL  Indications: pain      gabapentin (NEURONTIN) 100 MG capsule Take 1 capsule by mouth 2 (Two) Times a Day.      gabapentin (NEURONTIN) 100 MG capsule TAKE 1 CAPSULE BY MOUTH TWICE A  capsule 0    Ibuprofen (Advil) 200 MG capsule Take 800 mg by mouth Daily. Indications: Backache, pain      loratadine (CLARITIN) 10 MG tablet Take 1 tablet by mouth Daily. 1/2 pill 5mg   Indications: Hayfever (Patient taking differently: Take 1 tablet by mouth Daily. 1/2 pill 5mg) 90 tablet 3    NON FORMULARY OMEGA XL      vitamin D (ERGOCALCIFEROL) 1.25 MG (17427 UT) capsule capsule Take 1 capsule by mouth 1 (One) Time Per Week. 5 capsule 3    [DISCONTINUED] amLODIPine (NORVASC) 5 MG tablet Take 1 tablet by mouth Daily. (Patient taking differently: Take 1 tablet by mouth Daily.) 30 tablet 0    aspirin 81 MG EC tablet Take 1 tablet by mouth Daily for 90 days. 90 tablet 3    atorvastatin (LIPITOR) 10 MG tablet Take 1 tablet by mouth Daily for 90 days. 90 tablet 3     No facility-administered encounter medications on file as of 4/23/2024.         VITAL SIGNS     Visit Vitals  /80 (BP Location: Left arm, Patient Position: Sitting, Cuff Size: Adult)   Pulse 85   Ht 159.9 cm (62.97\")   Wt 72.6 kg (160 lb)   LMP  (LMP Unknown)   SpO2 94%   BMI 28.37 kg/m²         Wt Readings from Last 3 Encounters:   04/23/24 72.6 kg (160 lb)   04/11/24 72.6 kg (160 lb)   03/25/24 72.6 kg (160 lb)     Body mass index is 28.37 kg/m².      PHYSICAL EXAMINATION     Vitals and nursing note reviewed.   Constitutional:       Appearance: Not in distress. Frail. Chronically ill-appearing.      Comments: Morbidly obese   Neck:      Comments: JVD difficult to assess due to body habitus  Pulmonary:      Effort: Pulmonary effort is normal.   Cardiovascular:      Normal rate. Regular rhythm.      Murmurs: There is no murmur.   Edema:     Peripheral edema absent.   Abdominal:      " General: There is distension.      Palpations: Abdomen is soft.      Tenderness: There is no abdominal tenderness.   Skin:     General: Skin is warm and cool.   Neurological:      Mental Status: Alert and oriented to person, place and time.         REVIEWED DATA       ECG 12 Lead    Date/Time: 4/23/2024 1:59 PM  Performed by: Tang Cyr MD    Authorized by: Tang Cyr MD  Comparison: compared with previous ECG from 3/25/2024  Similar to previous ECG  Comparison to previous ECG: PVCs are no longer noted, otherwise no significant change  Rhythm: sinus rhythm  Rate: normal  Q waves: III and aVF      Clinical impression: abnormal EKG        Lab Results   Component Value Date    WBC 6.77 11/07/2023    HGB 14.4 11/07/2023    HCT 42.8 11/07/2023    MCV 89.5 11/07/2023     11/07/2023       Lab Results   Component Value Date    HGBA1C 6.50 (H) 02/21/2024       Lab Results   Component Value Date    GLUCOSE 152 (H) 02/21/2024    BUN 22 02/21/2024    CREATININE 0.72 02/21/2024    EGFRRESULT 85.2 02/21/2024    EGFR 89.6 11/07/2023    BCR 30.6 (H) 02/21/2024    K 4.4 02/21/2024    CO2 27.1 02/21/2024    CALCIUM 10.7 (H) 02/21/2024    PROTENTOTREF 6.7 02/21/2024    ALBUMIN 4.1 02/21/2024    BILITOT 0.5 02/21/2024    AST 11 02/21/2024    ALT 10 02/21/2024       Lab Results   Component Value Date    CHLPL 189 11/21/2023    TRIG 195 (H) 11/21/2023    HDL 46 11/21/2023     (H) 11/21/2023             ASSESSMENT & PLAN     Diagnoses and all orders for this visit:    1. Primary hypertension (Primary)  -     Lipid Panel; Future  -     Lipoprotein A (LPA); Future    2. Abdominal aneurysm  -     Lipid Panel; Future  -     Lipoprotein A (LPA); Future    3. Prediabetes  -     Lipid Panel; Future  -     Lipoprotein A (LPA); Future    4. Spinal stenosis of lumbar region, unspecified whether neurogenic claudication present  -     Lipid Panel; Future  -     Lipoprotein A (LPA); Future    5. Abnormal nuclear stress test    Other  orders  -     aspirin 81 MG EC tablet; Take 1 tablet by mouth Daily for 90 days.  Dispense: 90 tablet; Refill: 3  -     atorvastatin (LIPITOR) 10 MG tablet; Take 1 tablet by mouth Daily for 90 days.  Dispense: 90 tablet; Refill: 3      Abnormal nuclear med stress: Nuclear SPECT this month 4/2024 showed a moderate-sized reversible perfusion defect in the inferior and inferolateral wall.  Patient reported atypical chest pain that felt like muscle pull which has completely resolved for the past 2 weeks.  We discussed left heart catheterization and she is not at all interested especially given her age and frailty, and I think this is reasonable as she is at high risk for fall if placed on DAPT following any percutaneous intervention, and she is currently completely angina free.  We will focus on risk factor modifications, see below.  Hypertension: In office /80, mildly elevated.  On amlodipine 5 daily but it was discontinued when she left the nursing home.  Defer therapy reinitiation given high fall risk.  Hyperlipidemia: Lipid panel in 11/2023 showed HDL 46, .  10-year ASCVD risk 54%.  CTA abdomen pelvis in 10/2023 independently reviewed by me, which showed significant atherosclerotic changes in the abdominal aorta associated with focal aneurysm measuring 4.1cm x 4.3cm, although the proximal coronary arteries appeared open even though it was non-gated.  Will initiate medical therapy with aspirin 81 daily, atorvastatin 10 daily and repeat lipid panel in 3 months along with LP(a).  Prediabetes: Hemoglobin A1c 6.5 in 2/2024. Management per primary team and other specialists.  Overweight: BMI 28.  Spinal stenosis: Again strongly emphasized fall precaution.    I spent 30 minutes caring for Kirti on this date of service. This time includes time spent by me in the following activities: preparing for the visit, reviewing tests, obtaining and/or reviewing a separately obtained history, performing a medically  appropriate examination and/or evaluation, counseling and educating the patient/family/caregiver, and ordering medications, tests, or procedures.     Tang Cyr MD. PhD. [facility]  04/23/24  13:58 EDT    Part of this note may be an electronic transcription/translation of spoken language to printed text using the Dragon dictation system.

## 2024-05-06 ENCOUNTER — HOSPITAL ENCOUNTER (OUTPATIENT)
Facility: SURGERY CENTER | Age: 79
Setting detail: HOSPITAL OUTPATIENT SURGERY
Discharge: HOME | End: 2024-05-06
Attending: ANESTHESIOLOGY | Admitting: ANESTHESIOLOGY
Payer: MEDICARE

## 2024-05-06 ENCOUNTER — HOSPITAL ENCOUNTER (OUTPATIENT)
Dept: GENERAL RADIOLOGY | Facility: SURGERY CENTER | Age: 79
End: 2024-05-06
Payer: MEDICARE

## 2024-05-06 VITALS
SYSTOLIC BLOOD PRESSURE: 157 MMHG | TEMPERATURE: 98.9 F | DIASTOLIC BLOOD PRESSURE: 86 MMHG | HEIGHT: 63 IN | RESPIRATION RATE: 16 BRPM | OXYGEN SATURATION: 95 % | BODY MASS INDEX: 27.46 KG/M2 | WEIGHT: 155 LBS | HEART RATE: 83 BPM

## 2024-05-06 DIAGNOSIS — Z41.9 SURGERY, ELECTIVE: ICD-10-CM

## 2024-05-06 LAB — GLUCOSE BLDC GLUCOMTR-MCNC: 137 MG/DL (ref 70–130)

## 2024-05-06 PROCEDURE — 62323 NJX INTERLAMINAR LMBR/SAC: CPT | Performed by: ANESTHESIOLOGY

## 2024-05-06 PROCEDURE — 25510000001 IOPAMIDOL 61 % SOLUTION 30 ML VIAL: Performed by: ANESTHESIOLOGY

## 2024-05-06 PROCEDURE — 25010000002 DEXAMETHASONE SODIUM PHOSPHATE 100 MG/10ML SOLUTION 10 ML VIAL: Performed by: ANESTHESIOLOGY

## 2024-05-06 PROCEDURE — 25010000002 BUPIVACAINE (PF) 0.25 % SOLUTION 10 ML VIAL: Performed by: ANESTHESIOLOGY

## 2024-05-30 ENCOUNTER — TELEPHONE (OUTPATIENT)
Dept: ENDOCRINOLOGY | Age: 79
End: 2024-05-30
Payer: MEDICARE

## 2024-05-30 NOTE — TELEPHONE ENCOUNTER
Called pt to schedule a lab appt. Was able to speak with pt. Pt stated she has an appt with her PCP next week and would like to get labs competed there. Informed pt that would be okay. Pt had no further questions or concerns.

## 2024-06-03 ENCOUNTER — OFFICE VISIT (OUTPATIENT)
Dept: PAIN MEDICINE | Facility: CLINIC | Age: 79
End: 2024-06-03
Payer: MEDICARE

## 2024-06-03 ENCOUNTER — TELEPHONE (OUTPATIENT)
Dept: PAIN MEDICINE | Facility: CLINIC | Age: 79
End: 2024-06-03

## 2024-06-03 VITALS
OXYGEN SATURATION: 95 % | TEMPERATURE: 96.8 F | DIASTOLIC BLOOD PRESSURE: 72 MMHG | SYSTOLIC BLOOD PRESSURE: 118 MMHG | HEART RATE: 108 BPM | BODY MASS INDEX: 27.46 KG/M2 | HEIGHT: 63 IN

## 2024-06-03 DIAGNOSIS — M54.16 LUMBAR RADICULOPATHY: Primary | ICD-10-CM

## 2024-06-03 DIAGNOSIS — M48.062 LUMBAR STENOSIS WITH NEUROGENIC CLAUDICATION: ICD-10-CM

## 2024-06-03 DIAGNOSIS — M48.061 SPINAL STENOSIS OF LUMBAR REGION, UNSPECIFIED WHETHER NEUROGENIC CLAUDICATION PRESENT: ICD-10-CM

## 2024-06-03 DIAGNOSIS — M51.26 DISPLACEMENT OF LUMBAR INTERVERTEBRAL DISC WITHOUT MYELOPATHY: ICD-10-CM

## 2024-06-03 PROCEDURE — 1159F MED LIST DOCD IN RCRD: CPT | Performed by: PHYSICIAN ASSISTANT

## 2024-06-03 PROCEDURE — 1160F RVW MEDS BY RX/DR IN RCRD: CPT | Performed by: PHYSICIAN ASSISTANT

## 2024-06-03 PROCEDURE — 3078F DIAST BP <80 MM HG: CPT | Performed by: PHYSICIAN ASSISTANT

## 2024-06-03 PROCEDURE — 99214 OFFICE O/P EST MOD 30 MIN: CPT | Performed by: PHYSICIAN ASSISTANT

## 2024-06-03 PROCEDURE — 3074F SYST BP LT 130 MM HG: CPT | Performed by: PHYSICIAN ASSISTANT

## 2024-06-03 PROCEDURE — 1125F AMNT PAIN NOTED PAIN PRSNT: CPT | Performed by: PHYSICIAN ASSISTANT

## 2024-06-03 NOTE — PROGRESS NOTES
CHIEF COMPLAINT    Back pain    Subjective   Kirti Santillan is a 78 y.o. female  who presents to the office for follow-up of procedure.  She completed a LUMBAR EPIDURAL 1ST VISIT L4-5    on  5/6/24 performed by Dr. Baig for management of back pain. Patient reports a few days of 15-20% relief from the procedure.  The patient states that approximately 1 week ago she began experiencing significant swelling within the left lower extremity as well as difficulty being able to bear weight.  Her  has been having to push her around the house on the rollator due to her reported difficulty with walking.  Patient has not sought out medical attention nor did she contact our office with this information.  She continues with complaints of pain in a bandlike distribution across the lumbosacral spine that radiates into the left lower extremity affecting the anterior and lateral aspect of the thigh and also radiates into the lateral calf but not to the foot.    Pain today 8/10 VAS in severity.        Back Pain  This is a chronic problem. The current episode started more than 1 year ago. The problem occurs constantly. The problem is unchanged. The pain is present in the lumbar spine. The quality of the pain is described as aching and burning. The pain radiates to the left thigh and left anterolateral lower leg. The pain is at a severity of 8/10. The pain is moderate. The pain is The same all the time. The symptoms are aggravated by position and standing. Associated symptoms include leg pain, numbness (left leg, left hand) and weakness (bilateral leg). Pertinent negatives include no abdominal pain, dysuria or fever.        PEG Assessment   What number best describes your pain on average in the past week?8  What number best describes how, during the past week, pain has interfered with your enjoyment of life?10  What number best describes how, during the past week, pain has interfered with your general activity?  10    Review of  "Pertinent Medical Data ---  No new imaging to review on today    The following portions of the patient's history were reviewed and updated as appropriate: allergies, current medications, past family history, past medical history, past social history, past surgical history, and problem list.    Review of Systems   Constitutional:  Negative for chills and fever.   Respiratory:  Negative for cough and shortness of breath.    Cardiovascular:  Positive for leg swelling (left leg/foot).   Gastrointestinal:  Negative for abdominal pain and constipation.   Genitourinary:  Negative for difficulty urinating and dysuria.   Musculoskeletal:  Positive for back pain and gait problem.   Neurological:  Positive for weakness (bilateral leg) and numbness (left leg, left hand). Negative for dizziness and light-headedness.   Psychiatric/Behavioral:  Negative for agitation.      I have reviewed and confirmed the accuracy of the ROS as documented by the MA/LPN/RN ANGEL LUIS Galeas   Vitals:    06/03/24 1257   BP: 118/72   BP Location: Right arm   Patient Position: Sitting   Pulse: 108   Temp: 96.8 °F (36 °C)   TempSrc: Temporal   SpO2: 95%   Height: 160 cm (63\")   PainSc:   8   PainLoc: Back         Objective   Physical Exam  Vitals and nursing note reviewed. Exam conducted with a chaperone present ().   Constitutional:       Appearance: Normal appearance.   HENT:      Head: Normocephalic.   Pulmonary:      Effort: Pulmonary effort is normal.   Musculoskeletal:      Lumbar back: Tenderness present. Decreased range of motion.      Left upper leg: Edema present.      Left lower leg: 3+ Edema present.      Comments: Warmth to light touch over the left calf when compared to the right   Skin:     General: Skin is warm and dry.   Neurological:      General: No focal deficit present.      Mental Status: She is alert and oriented to person, place, and time.      Cranial Nerves: Cranial nerves 2-12 are intact.      Sensory: Sensation is " "intact.      Motor: Weakness present.      Gait: Gait abnormal (Ambulating seated on rollator; patient is able to weight-bear while holding onto the rollator).      Deep Tendon Reflexes:      Reflex Scores:       Patellar reflexes are 0 on the right side and 0 on the left side.  Psychiatric:         Mood and Affect: Mood normal.         Behavior: Behavior normal.         Thought Content: Thought content normal.         Judgment: Judgment normal.         Assessment & Plan   Diagnoses and all orders for this visit:    1. Lumbar radiculopathy (Primary)    2. Spinal stenosis of lumbar region, unspecified whether neurogenic claudication present    3. Lumbar stenosis with neurogenic claudication    4. Displacement of lumbar intervertebral disc without myelopathy        Kirti Santillan reports a pain score of 8.  Given her pain assessment as noted, treatment options were discussed and the following options were decided upon as a follow-up plan to address the patient's pain: continuation of current treatment plan for pain and referral to specialist for assistance in pain treatment guidance.      --- I discussed with the patient and her  that I strongly urged immediate evaluation at the emergency department for the left lower extremity swelling, warmth and reported weakness that she has been experiencing over the past week.  The patient states that she has an appointment scheduled with Dr. Sprague, her primary care provider on tomorrow at 1 PM however I strongly advised that she present to the ED on today.  The patient states that she \"we will wait and see him on tomorrow.\"    --I reached out to Dr. Baig to notify her of this issue and she also strongly urged the patient to present to the ED on today for mediate evaluation which was relayed to the patient via our nurse, KARINA Nelson RN.    --- Follow-up in 1 month for further evaluation and treat recommendations to be made            Dictated utilizing Godwin " dictation.

## 2024-06-03 NOTE — TELEPHONE ENCOUNTER
"Spoke to both  and patient today regarding her LLE swelling and warmth noted by ANGEL LUIS Frey at OV today, both ANGEL LUIS Frey and Dr. Nikole Baig recommended that she be seen in the ER today. The patient states that she \"is almost home now\" and I again reiterated that the recommendation is for her to be seen in the ER today. The patient stated OK.  "

## 2024-06-04 ENCOUNTER — HOSPITAL ENCOUNTER (INPATIENT)
Facility: HOSPITAL | Age: 79
LOS: 3 days | Discharge: NURSING, REHAB OR LONG-TERM CARE | DRG: 301 | End: 2024-06-08
Attending: STUDENT IN AN ORGANIZED HEALTH CARE EDUCATION/TRAINING PROGRAM | Admitting: INTERNAL MEDICINE
Payer: MEDICARE

## 2024-06-04 ENCOUNTER — OFFICE VISIT (OUTPATIENT)
Dept: INTERNAL MEDICINE | Facility: CLINIC | Age: 79
End: 2024-06-04
Payer: MEDICARE

## 2024-06-04 ENCOUNTER — HOSPITAL ENCOUNTER (OUTPATIENT)
Dept: CARDIOLOGY | Facility: HOSPITAL | Age: 79
Discharge: HOME | End: 2024-06-04
Payer: MEDICARE

## 2024-06-04 VITALS
SYSTOLIC BLOOD PRESSURE: 112 MMHG | DIASTOLIC BLOOD PRESSURE: 70 MMHG | BODY MASS INDEX: 27.46 KG/M2 | WEIGHT: 155 LBS | TEMPERATURE: 97.1 F | HEART RATE: 98 BPM | HEIGHT: 63 IN | OXYGEN SATURATION: 97 %

## 2024-06-04 DIAGNOSIS — R54 AGE-RELATED PHYSICAL DEBILITY: ICD-10-CM

## 2024-06-04 DIAGNOSIS — R29.6 MULTIPLE FALLS: ICD-10-CM

## 2024-06-04 DIAGNOSIS — M79.89 LEG SWELLING: Primary | ICD-10-CM

## 2024-06-04 DIAGNOSIS — I71.40 ABDOMINAL ANEURYSM: ICD-10-CM

## 2024-06-04 DIAGNOSIS — M54.16 LUMBAR RADICULOPATHY: ICD-10-CM

## 2024-06-04 DIAGNOSIS — I82.412 ACUTE DEEP VEIN THROMBOSIS (DVT) OF FEMORAL VEIN OF LEFT LOWER EXTREMITY: Primary | ICD-10-CM

## 2024-06-04 PROBLEM — I82.409 DVT (DEEP VENOUS THROMBOSIS): Status: ACTIVE | Noted: 2024-06-04

## 2024-06-04 LAB
ALBUMIN SERPL-MCNC: 3.4 G/DL (ref 3.5–5.2)
ALBUMIN/GLOB SERPL: 1 G/DL
ALP SERPL-CCNC: 137 U/L (ref 39–117)
ALT SERPL W P-5'-P-CCNC: 7 U/L (ref 1–33)
ANION GAP SERPL CALCULATED.3IONS-SCNC: 9 MMOL/L (ref 5–15)
AST SERPL-CCNC: 6 U/L (ref 1–32)
BASOPHILS # BLD AUTO: 0.05 10*3/MM3 (ref 0–0.2)
BASOPHILS NFR BLD AUTO: 0.5 % (ref 0–1.5)
BH CV LOW VAS LEFT COMMON FEMORAL SPONT: 1
BH CV LOW VAS LEFT DISTAL FEMORAL SPONT: 1
BH CV LOW VAS LEFT EXTERNAL ILIAC AUGMENT: NORMAL
BH CV LOW VAS LEFT EXTERNAL ILIAC COMPRESS: NORMAL
BH CV LOW VAS LEFT EXTERNAL ILIAC SPONT: 1
BH CV LOW VAS LEFT EXTERNAL ILIAC THROMBUS: NORMAL
BH CV LOW VAS LEFT GASTRONEMIUS VESSEL: 1
BH CV LOW VAS LEFT GREATER SAPH AK VESSEL: 1
BH CV LOW VAS LEFT LESSER SAPH VESSEL: 1
BH CV LOW VAS LEFT MID FEMORAL SPONT: 1
BH CV LOW VAS LEFT POPLITEAL SPONT: 1
BH CV LOW VAS LEFT PROFUNDA FEMORAL SPONT: 1
BH CV LOW VAS LEFT PROXIMAL FEMORAL SPONT: 1
BH CV LOW VAS LEFT SAPHENOFEMORAL JUNCTION SPONT: 1
BH CV LOWER VASCULAR LEFT COMMON FEMORAL AUGMENT: NORMAL
BH CV LOWER VASCULAR LEFT COMMON FEMORAL COMPETENT: NORMAL
BH CV LOWER VASCULAR LEFT COMMON FEMORAL COMPRESS: NORMAL
BH CV LOWER VASCULAR LEFT COMMON FEMORAL PHASIC: NORMAL
BH CV LOWER VASCULAR LEFT COMMON FEMORAL SPONT: NORMAL
BH CV LOWER VASCULAR LEFT COMMON FEMORAL THROMBUS: NORMAL
BH CV LOWER VASCULAR LEFT DISTAL FEMORAL AUGMENT: NORMAL
BH CV LOWER VASCULAR LEFT DISTAL FEMORAL COMPETENT: NORMAL
BH CV LOWER VASCULAR LEFT DISTAL FEMORAL COMPRESS: NORMAL
BH CV LOWER VASCULAR LEFT DISTAL FEMORAL PHASIC: NORMAL
BH CV LOWER VASCULAR LEFT DISTAL FEMORAL SPONT: NORMAL
BH CV LOWER VASCULAR LEFT DISTAL FEMORAL THROMBUS: NORMAL
BH CV LOWER VASCULAR LEFT EXTERNAL ILIAC COMPETENT: NORMAL
BH CV LOWER VASCULAR LEFT EXTERNAL ILIAC PHASIC: NORMAL
BH CV LOWER VASCULAR LEFT EXTERNAL ILIAC SPONT: NORMAL
BH CV LOWER VASCULAR LEFT GASTRONEMIUS COMPRESS: NORMAL
BH CV LOWER VASCULAR LEFT GASTRONEMIUS THROMBUS: NORMAL
BH CV LOWER VASCULAR LEFT GREATER SAPH AK COMPRESS: NORMAL
BH CV LOWER VASCULAR LEFT GREATER SAPH AK THROMBUS: NORMAL
BH CV LOWER VASCULAR LEFT GREATER SAPH BK COMPRESS: NORMAL
BH CV LOWER VASCULAR LEFT LESSER SAPH COMPRESS: NORMAL
BH CV LOWER VASCULAR LEFT MID FEMORAL AUGMENT: NORMAL
BH CV LOWER VASCULAR LEFT MID FEMORAL COMPETENT: NORMAL
BH CV LOWER VASCULAR LEFT MID FEMORAL COMPRESS: NORMAL
BH CV LOWER VASCULAR LEFT MID FEMORAL PHASIC: NORMAL
BH CV LOWER VASCULAR LEFT MID FEMORAL SPONT: NORMAL
BH CV LOWER VASCULAR LEFT MID FEMORAL THROMBUS: NORMAL
BH CV LOWER VASCULAR LEFT POPLITEAL AUGMENT: NORMAL
BH CV LOWER VASCULAR LEFT POPLITEAL COMPETENT: NORMAL
BH CV LOWER VASCULAR LEFT POPLITEAL COMPRESS: NORMAL
BH CV LOWER VASCULAR LEFT POPLITEAL PHASIC: NORMAL
BH CV LOWER VASCULAR LEFT POPLITEAL SPONT: NORMAL
BH CV LOWER VASCULAR LEFT POPLITEAL THROMBUS: NORMAL
BH CV LOWER VASCULAR LEFT PROFUNDA FEMORAL COMPRESS: NORMAL
BH CV LOWER VASCULAR LEFT PROFUNDA FEMORAL THROMBUS: NORMAL
BH CV LOWER VASCULAR LEFT PROXIMAL FEMORAL AUGMENT: NORMAL
BH CV LOWER VASCULAR LEFT PROXIMAL FEMORAL COMPETENT: NORMAL
BH CV LOWER VASCULAR LEFT PROXIMAL FEMORAL COMPRESS: NORMAL
BH CV LOWER VASCULAR LEFT PROXIMAL FEMORAL PHASIC: NORMAL
BH CV LOWER VASCULAR LEFT PROXIMAL FEMORAL SPONT: NORMAL
BH CV LOWER VASCULAR LEFT PROXIMAL FEMORAL THROMBUS: NORMAL
BH CV LOWER VASCULAR LEFT SAPHENOFEMORAL JUNCTION COMPRESS: NORMAL
BH CV LOWER VASCULAR LEFT SAPHENOFEMORAL JUNCTION THROMBUS: NORMAL
BH CV LOWER VASCULAR RIGHT COMMON FEMORAL AUGMENT: NORMAL
BH CV LOWER VASCULAR RIGHT COMMON FEMORAL COMPETENT: NORMAL
BH CV LOWER VASCULAR RIGHT COMMON FEMORAL COMPRESS: NORMAL
BH CV LOWER VASCULAR RIGHT COMMON FEMORAL PHASIC: NORMAL
BH CV LOWER VASCULAR RIGHT COMMON FEMORAL SPONT: NORMAL
BH CV LOWER VASCULAR RIGHT EXTERNAL ILIAC AUGMENT: NORMAL
BH CV LOWER VASCULAR RIGHT EXTERNAL ILIAC COMPETENT: NORMAL
BH CV LOWER VASCULAR RIGHT EXTERNAL ILIAC COMPRESS: NORMAL
BH CV LOWER VASCULAR RIGHT EXTERNAL ILIAC PHASIC: NORMAL
BH CV LOWER VASCULAR RIGHT EXTERNAL ILIAC SPONT: NORMAL
BH CV VAS PRELIMINARY FINDINGS SCRIPTING: 1
BILIRUB SERPL-MCNC: 0.4 MG/DL (ref 0–1.2)
BUN SERPL-MCNC: 13 MG/DL (ref 8–23)
BUN/CREAT SERPL: 16.9 (ref 7–25)
CALCIUM SPEC-SCNC: 10.3 MG/DL (ref 8.6–10.5)
CHLORIDE SERPL-SCNC: 101 MMOL/L (ref 98–107)
CO2 SERPL-SCNC: 26 MMOL/L (ref 22–29)
CREAT SERPL-MCNC: 0.77 MG/DL (ref 0.57–1)
DEPRECATED RDW RBC AUTO: 44.8 FL (ref 37–54)
EGFRCR SERPLBLD CKD-EPI 2021: 78.6 ML/MIN/1.73
EOSINOPHIL # BLD AUTO: 0.1 10*3/MM3 (ref 0–0.4)
EOSINOPHIL NFR BLD AUTO: 1 % (ref 0.3–6.2)
ERYTHROCYTE [DISTWIDTH] IN BLOOD BY AUTOMATED COUNT: 14.3 % (ref 12.3–15.4)
GLOBULIN UR ELPH-MCNC: 3.3 GM/DL
GLUCOSE SERPL-MCNC: 146 MG/DL (ref 65–99)
HCT VFR BLD AUTO: 42.1 % (ref 34–46.6)
HGB BLD-MCNC: 13.4 G/DL (ref 12–15.9)
IMM GRANULOCYTES # BLD AUTO: 0.06 10*3/MM3 (ref 0–0.05)
IMM GRANULOCYTES NFR BLD AUTO: 0.6 % (ref 0–0.5)
LYMPHOCYTES # BLD AUTO: 1.17 10*3/MM3 (ref 0.7–3.1)
LYMPHOCYTES NFR BLD AUTO: 12 % (ref 19.6–45.3)
MCH RBC QN AUTO: 27.7 PG (ref 26.6–33)
MCHC RBC AUTO-ENTMCNC: 31.8 G/DL (ref 31.5–35.7)
MCV RBC AUTO: 87 FL (ref 79–97)
MONOCYTES # BLD AUTO: 0.61 10*3/MM3 (ref 0.1–0.9)
MONOCYTES NFR BLD AUTO: 6.2 % (ref 5–12)
NEUTROPHILS NFR BLD AUTO: 7.78 10*3/MM3 (ref 1.7–7)
NEUTROPHILS NFR BLD AUTO: 79.7 % (ref 42.7–76)
NRBC BLD AUTO-RTO: 0 /100 WBC (ref 0–0.2)
PLATELET # BLD AUTO: 259 10*3/MM3 (ref 140–450)
PMV BLD AUTO: 11.2 FL (ref 6–12)
POTASSIUM SERPL-SCNC: 4 MMOL/L (ref 3.5–5.2)
PROT SERPL-MCNC: 6.7 G/DL (ref 6–8.5)
RBC # BLD AUTO: 4.84 10*6/MM3 (ref 3.77–5.28)
SODIUM SERPL-SCNC: 136 MMOL/L (ref 136–145)
WBC NRBC COR # BLD AUTO: 9.77 10*3/MM3 (ref 3.4–10.8)

## 2024-06-04 PROCEDURE — G0378 HOSPITAL OBSERVATION PER HR: HCPCS

## 2024-06-04 PROCEDURE — 93971 EXTREMITY STUDY: CPT

## 2024-06-04 PROCEDURE — 85025 COMPLETE CBC W/AUTO DIFF WBC: CPT | Performed by: STUDENT IN AN ORGANIZED HEALTH CARE EDUCATION/TRAINING PROGRAM

## 2024-06-04 PROCEDURE — 1125F AMNT PAIN NOTED PAIN PRSNT: CPT | Performed by: STUDENT IN AN ORGANIZED HEALTH CARE EDUCATION/TRAINING PROGRAM

## 2024-06-04 PROCEDURE — 80053 COMPREHEN METABOLIC PANEL: CPT | Performed by: STUDENT IN AN ORGANIZED HEALTH CARE EDUCATION/TRAINING PROGRAM

## 2024-06-04 PROCEDURE — G2211 COMPLEX E/M VISIT ADD ON: HCPCS | Performed by: STUDENT IN AN ORGANIZED HEALTH CARE EDUCATION/TRAINING PROGRAM

## 2024-06-04 PROCEDURE — 3078F DIAST BP <80 MM HG: CPT | Performed by: STUDENT IN AN ORGANIZED HEALTH CARE EDUCATION/TRAINING PROGRAM

## 2024-06-04 PROCEDURE — 99285 EMERGENCY DEPT VISIT HI MDM: CPT

## 2024-06-04 PROCEDURE — 25010000002 ENOXAPARIN PER 10 MG: Performed by: STUDENT IN AN ORGANIZED HEALTH CARE EDUCATION/TRAINING PROGRAM

## 2024-06-04 PROCEDURE — 93971 EXTREMITY STUDY: CPT | Performed by: STUDENT IN AN ORGANIZED HEALTH CARE EDUCATION/TRAINING PROGRAM

## 2024-06-04 PROCEDURE — 3074F SYST BP LT 130 MM HG: CPT | Performed by: STUDENT IN AN ORGANIZED HEALTH CARE EDUCATION/TRAINING PROGRAM

## 2024-06-04 PROCEDURE — 1160F RVW MEDS BY RX/DR IN RCRD: CPT | Performed by: STUDENT IN AN ORGANIZED HEALTH CARE EDUCATION/TRAINING PROGRAM

## 2024-06-04 PROCEDURE — 99214 OFFICE O/P EST MOD 30 MIN: CPT | Performed by: STUDENT IN AN ORGANIZED HEALTH CARE EDUCATION/TRAINING PROGRAM

## 2024-06-04 PROCEDURE — 1159F MED LIST DOCD IN RCRD: CPT | Performed by: STUDENT IN AN ORGANIZED HEALTH CARE EDUCATION/TRAINING PROGRAM

## 2024-06-04 RX ORDER — GABAPENTIN 100 MG/1
100 CAPSULE ORAL 2 TIMES DAILY
Status: DISCONTINUED | OUTPATIENT
Start: 2024-06-04 | End: 2024-06-05

## 2024-06-04 RX ORDER — UREA 10 %
2.5 LOTION (ML) TOPICAL NIGHTLY PRN
Status: DISCONTINUED | OUTPATIENT
Start: 2024-06-04 | End: 2024-06-08 | Stop reason: HOSPADM

## 2024-06-04 RX ORDER — BISACODYL 5 MG/1
5 TABLET, DELAYED RELEASE ORAL DAILY PRN
Status: DISCONTINUED | OUTPATIENT
Start: 2024-06-04 | End: 2024-06-08 | Stop reason: HOSPADM

## 2024-06-04 RX ORDER — ONDANSETRON 4 MG/1
4 TABLET, ORALLY DISINTEGRATING ORAL EVERY 6 HOURS PRN
Status: DISCONTINUED | OUTPATIENT
Start: 2024-06-04 | End: 2024-06-08 | Stop reason: HOSPADM

## 2024-06-04 RX ORDER — BISACODYL 10 MG
10 SUPPOSITORY, RECTAL RECTAL DAILY PRN
Status: DISCONTINUED | OUTPATIENT
Start: 2024-06-04 | End: 2024-06-08 | Stop reason: HOSPADM

## 2024-06-04 RX ORDER — POLYETHYLENE GLYCOL 3350 17 G/17G
17 POWDER, FOR SOLUTION ORAL DAILY PRN
Status: DISCONTINUED | OUTPATIENT
Start: 2024-06-04 | End: 2024-06-08 | Stop reason: HOSPADM

## 2024-06-04 RX ORDER — ACETAMINOPHEN 325 MG/1
650 TABLET ORAL EVERY 4 HOURS PRN
Status: DISCONTINUED | OUTPATIENT
Start: 2024-06-04 | End: 2024-06-08 | Stop reason: HOSPADM

## 2024-06-04 RX ORDER — ASPIRIN 81 MG/1
81 TABLET ORAL DAILY
Status: DISCONTINUED | OUTPATIENT
Start: 2024-06-05 | End: 2024-06-08 | Stop reason: HOSPADM

## 2024-06-04 RX ORDER — ATORVASTATIN CALCIUM 20 MG/1
10 TABLET, FILM COATED ORAL DAILY
Status: DISCONTINUED | OUTPATIENT
Start: 2024-06-05 | End: 2024-06-08 | Stop reason: HOSPADM

## 2024-06-04 RX ORDER — ENOXAPARIN SODIUM 100 MG/ML
1 INJECTION SUBCUTANEOUS EVERY 12 HOURS
Status: DISCONTINUED | OUTPATIENT
Start: 2024-06-05 | End: 2024-06-05

## 2024-06-04 RX ORDER — ENOXAPARIN SODIUM 100 MG/ML
1 INJECTION SUBCUTANEOUS ONCE
Status: COMPLETED | OUTPATIENT
Start: 2024-06-04 | End: 2024-06-04

## 2024-06-04 RX ORDER — AMOXICILLIN 250 MG
2 CAPSULE ORAL 2 TIMES DAILY PRN
Status: DISCONTINUED | OUTPATIENT
Start: 2024-06-04 | End: 2024-06-08 | Stop reason: HOSPADM

## 2024-06-04 RX ORDER — ONDANSETRON 2 MG/ML
4 INJECTION INTRAMUSCULAR; INTRAVENOUS EVERY 6 HOURS PRN
Status: DISCONTINUED | OUTPATIENT
Start: 2024-06-04 | End: 2024-06-08 | Stop reason: HOSPADM

## 2024-06-04 RX ADMIN — ENOXAPARIN SODIUM 70 MG: 100 INJECTION SUBCUTANEOUS at 17:46

## 2024-06-04 RX ADMIN — GABAPENTIN 100 MG: 100 CAPSULE ORAL at 22:17

## 2024-06-04 NOTE — ED PROVIDER NOTES
EMERGENCY DEPARTMENT ENCOUNTER    Room Number:  34/34  PCP: Deangelo Sprague MD  History obtained from: Patient      HPI:  Chief Complaint: Left leg swelling  A complete HPI/ROS/PMH/PSH/SH/FH are unobtainable due to: N/A  Context: Kirti Santillan is a 78 y.o. female who presents to the ED c/o left leg swelling.  Ongoing for the last several days, not painful.  Had an ultrasound today which demonstrated DVT.  No other recent illness, fever, chills.  No chest pain or shortness of breath.            PAST MEDICAL HISTORY  Active Ambulatory Problems     Diagnosis Date Noted    Multiple falls 10/31/2023    Abdominal aneurysm 11/01/2023    Chronic back pain 11/01/2023    Lumbar radiculopathy 11/02/2023    Lumbar spinal stenosis 11/02/2023    Primary hypertension 11/03/2023    Prediabetes 11/04/2023    Lumbar stenosis with neurogenic claudication 04/11/2024    Displacement of lumbar intervertebral disc without myelopathy 04/11/2024    Abnormal nuclear stress test 04/23/2024     Resolved Ambulatory Problems     Diagnosis Date Noted    No Resolved Ambulatory Problems     Past Medical History:   Diagnosis Date    High blood pressure 11/3/2023    Low back pain     Sciatica of left side          PAST SURGICAL HISTORY  Past Surgical History:   Procedure Laterality Date    EPIDURAL BLOCK      EYE SURGERY      LUMBAR EPIDURAL INJECTION N/A 5/6/2024    Procedure: LUMBAR EPIDURAL 1ST VISIT L4-5;  Surgeon: Nikole Baig MD;  Location: [facility] [place] [facility];  Service: Pain Management;  Laterality: N/A;         FAMILY HISTORY  History reviewed. No pertinent family history.      SOCIAL HISTORY  Social History     Socioeconomic History    Marital status:    Tobacco Use    Smoking status: Former     Types: Cigarettes     Passive exposure: Past    Smokeless tobacco: Never   Vaping Use    Vaping status: Never Used   Substance and Sexual Activity    Alcohol use: Not Currently    Drug use: Never    Sexual activity: Not Currently          ALLERGIES  Hydrocodone        REVIEW OF SYSTEMS    As per HPI      PHYSICAL EXAM  ED Triage Vitals   Temp Heart Rate Resp BP SpO2   06/04/24 1625 06/04/24 1625 06/04/24 1625 06/04/24 1642 06/04/24 1625   97.5 °F (36.4 °C) 88 19 135/79 99 %      Temp src Heart Rate Source Patient Position BP Location FiO2 (%)   -- -- -- -- --              Physical Exam  Constitutional:       General: She is not in acute distress.  HENT:      Head: Normocephalic and atraumatic.   Cardiovascular:      Rate and Rhythm: Normal rate and regular rhythm.   Pulmonary:      Effort: Pulmonary effort is normal. No respiratory distress.   Abdominal:      General: There is no distension.      Palpations: Abdomen is soft.      Tenderness: There is no abdominal tenderness.   Musculoskeletal:         General: No swelling or deformity.      Left lower leg: Edema present.   Skin:     General: Skin is warm and dry.   Neurological:      Mental Status: She is alert. Mental status is at baseline.           Vital signs and nursing notes reviewed.          LAB RESULTS  Recent Results (from the past 24 hour(s))   Duplex Venous Lower Extremity - Left CAR    Collection Time: 06/04/24  3:59 PM   Result Value Ref Range    Left External Iliac Spont 1.0     Right External Iliac Spont Y     Right External Iliac Competent Y     Right External Iliac Phasic Y     Right External Iliac Compress C     Right External Iliac Augment Y     Right Common Femoral Spont Y     Right Common Femoral Competent Y     Right Common Femoral Phasic Y     Right Common Femoral Compress C     Right Common Femoral Augment Y     Left External Iliac Spont N     Left External Iliac Competent N     Left External Iliac Phasic N     Left External Iliac Compress N     Left External Iliac Augment N     Left Common Femoral Spont 1.0     Left Saphenofemoral Junction Spont 1.0     Left Profunda Femoral Spont 1.0     Left Proximal Femoral Spont 1.0     Left Mid Femoral Spont 1.0     Left Distal  Femoral Spont 1.0     Left Popliteal Spont 1.0     Left Gastronemius Vessel 1.0     Left Greater Saph AK Vessel 1.0     Left Lesser Saph Vessel 1.0     Left External Iliac Thrombus A     Left Common Femoral Spont N     Left Common Femoral Competent N     Left Common Femoral Phasic N     Left Common Femoral Compress N     Left Common Femoral Augment N     Left Common Femoral Thrombus A     Left Saphenofemoral Junction Compress N     Left Saphenofemoral Junction Thrombus A     Left Profunda Femoral Compress N     Left Profunda Femoral Thrombus A     Left Proximal Femoral Spont N     Left Proximal Femoral Competent N     Left Proximal Femoral Phasic N     Left Proximal Femoral Compress N     Left Proximal Femoral Augment N     Left Proximal Femoral Thrombus A     Left Mid Femoral Spont N     Left Mid Femoral Competent N     Left Mid Femoral Phasic N     Left Mid Femoral Compress N     Left Mid Femoral Augment N     Left Mid Femoral Thrombus A     Left Distal Femoral Spont N     Left Distal Femoral Competent N     Left Distal Femoral Phasic N     Left Distal Femoral Compress N     Left Distal Femoral Augment N     Left Distal Femoral Thrombus A     Left Popliteal Spont N     Left Popliteal Competent N     Left Popliteal Phasic N     Left Popliteal Compress N     Left Popliteal Augment N     Left Popliteal Thrombus A     Left Gastronemius Compress N     Left Gastronemius Thrombus A     Left Greater Saph AK Compress N     Left Greater Saph AK Thrombus A     Left Greater Saph BK Compress C     Left Lesser Saph Compress N     BH CV VAS PRELIMINARY FINDINGS SCRIPTING 1.0    Comprehensive Metabolic Panel    Collection Time: 06/04/24  5:08 PM    Specimen: Blood   Result Value Ref Range    Glucose 146 (H) 65 - 99 mg/dL    BUN 13 8 - 23 mg/dL    Creatinine 0.77 0.57 - 1.00 mg/dL    Sodium 136 136 - 145 mmol/L    Potassium 4.0 3.5 - 5.2 mmol/L    Chloride 101 98 - 107 mmol/L    CO2 26.0 22.0 - 29.0 mmol/L    Calcium 10.3 8.6 -  10.5 mg/dL    Total Protein 6.7 6.0 - 8.5 g/dL    Albumin 3.4 (L) 3.5 - 5.2 g/dL    ALT (SGPT) 7 1 - 33 U/L    AST (SGOT) 6 1 - 32 U/L    Alkaline Phosphatase 137 (H) 39 - 117 U/L    Total Bilirubin 0.4 0.0 - 1.2 mg/dL    Globulin 3.3 gm/dL    A/G Ratio 1.0 g/dL    BUN/Creatinine Ratio 16.9 7.0 - 25.0    Anion Gap 9.0 5.0 - 15.0 mmol/L    eGFR 78.6 >60.0 mL/min/1.73   CBC Auto Differential    Collection Time: 06/04/24  5:08 PM    Specimen: Blood   Result Value Ref Range    WBC 9.77 3.40 - 10.80 10*3/mm3    RBC 4.84 3.77 - 5.28 10*6/mm3    Hemoglobin 13.4 12.0 - 15.9 g/dL    Hematocrit 42.1 34.0 - 46.6 %    MCV 87.0 79.0 - 97.0 fL    MCH 27.7 26.6 - 33.0 pg    MCHC 31.8 31.5 - 35.7 g/dL    RDW 14.3 12.3 - 15.4 %    RDW-SD 44.8 37.0 - 54.0 fl    MPV 11.2 6.0 - 12.0 fL    Platelets 259 140 - 450 10*3/mm3    Neutrophil % 79.7 (H) 42.7 - 76.0 %    Lymphocyte % 12.0 (L) 19.6 - 45.3 %    Monocyte % 6.2 5.0 - 12.0 %    Eosinophil % 1.0 0.3 - 6.2 %    Basophil % 0.5 0.0 - 1.5 %    Immature Grans % 0.6 (H) 0.0 - 0.5 %    Neutrophils, Absolute 7.78 (H) 1.70 - 7.00 10*3/mm3    Lymphocytes, Absolute 1.17 0.70 - 3.10 10*3/mm3    Monocytes, Absolute 0.61 0.10 - 0.90 10*3/mm3    Eosinophils, Absolute 0.10 0.00 - 0.40 10*3/mm3    Basophils, Absolute 0.05 0.00 - 0.20 10*3/mm3    Immature Grans, Absolute 0.06 (H) 0.00 - 0.05 10*3/mm3    nRBC 0.0 0.0 - 0.2 /100 WBC       Ordered the above labs and reviewed the results.        RADIOLOGY  Duplex Venous Lower Extremity - Left CAR    Result Date: 6/4/2024    Acute left lower extremity deep vein thrombosis noted in the common iliac, external iliac, common femoral, deep femoral, proximal femoral, mid femoral, distal femoral, popliteal and gastrocnemius.  IVC is patent without evidence of thrombus.  Right common iliac is patent without evidence of thrombus.   Acute left lower extremity superficial thrombophlebitis noted in the saphenofemoral junction and great saphenous (above knee).    Incidental finding of 3.7 cm infrarenal abdominal aortic aneurysm.      Ordered the above noted radiological studies. Reviewed by me in PACS.            MEDICATIONS GIVEN IN ER  Medications   acetaminophen (TYLENOL) tablet 650 mg (has no administration in time range)   ondansetron ODT (ZOFRAN-ODT) disintegrating tablet 4 mg (has no administration in time range)     Or   ondansetron (ZOFRAN) injection 4 mg (has no administration in time range)   melatonin tablet 2.5 mg (has no administration in time range)   sennosides-docusate (PERICOLACE) 8.6-50 MG per tablet 2 tablet (has no administration in time range)     And   polyethylene glycol (MIRALAX) packet 17 g (has no administration in time range)     And   bisacodyl (DULCOLAX) EC tablet 5 mg (has no administration in time range)     And   bisacodyl (DULCOLAX) suppository 10 mg (has no administration in time range)   Enoxaparin Sodium (LOVENOX) syringe 70 mg (has no administration in time range)   Enoxaparin Sodium (LOVENOX) syringe 70 mg (70 mg Subcutaneous Given 6/4/24 1746)               MEDICAL DECISION MAKING, PROGRESS, and CONSULTS    MDM: Patient presented emergency department with left lower extremity DVT, swelling.  Otherwise well-appearing, vitals otherwise stable.  No evidence of phlegmasia or other significant complication.  Started Lovenox.  Labs otherwise reassuring.  Will admit for observation.    All labs have been independently reviewed by me.  All radiology studies have been reviewed by me and I have also reviewed the radiology report.   EKG's independently viewed and interpreted by me.  Discussion below represents my analysis of pertinent findings related to patient's condition, differential diagnosis, treatment plan and final disposition.      Additional sources:  - Discussed/ obtained information from independent historians:      - External (non-ED) record review:     - Chronic or social conditions impacting care: Low back pain    - Shared decision  making: Discussed plan for admission, patient agrees      Orders placed during this visit:  Orders Placed This Encounter   Procedures    Comprehensive Metabolic Panel    CBC Auto Differential    Basic Metabolic Panel    CBC (No Diff)    Diet: Cardiac; Healthy Heart (2-3 Na+); Fluid Consistency: Thin (IDDSI 0)    Vital Signs    Up with assistance    Daily Weights    Strict Intake & Output    Oral Care    Code Status and Medical Interventions:    LHA (on-call MD unless specified) Details    Initiate Observation Status    Initiate Observation Status    CBC & Differential         Additional orders considered but not ordered:  Considered arterial study on the left lower extremity however no evidence of perfusion deficit.        Differential diagnosis includes but is not limited to:    DVT, phlegmasia cerulea dolens, electrolyte abnormality, renal failure, PE      Independent interpretation of labs, radiology studies, and discussions with consultants:           DIAGNOSIS  Final diagnoses:   Acute deep vein thrombosis (DVT) of femoral vein of left lower extremity         DISPOSITION  Admitted to telemetry        Latest Documented Vital Signs:  As of 18:21 EDT  BP- 133/80 HR- 81 Temp- 97.5 °F (36.4 °C) O2 sat- 97%              --    Please note that portions of this were completed with a voice recognition program.       Note Disclaimer: At [facility], we believe that sharing information builds trust and better relationships. You are receiving this note because you are receiving care at [facility] or recently visited. It is possible you will see health information before a provider has talked with you about it. This kind of information can be easy to misunderstand. To help you fully understand what it means for your health, we urge you to discuss this note with your provider.             Yogi Thornton MD  06/04/24 4751

## 2024-06-04 NOTE — ED NOTES
"Nursing report ED to floor  Kirti Santillan  78 y.o.  female    HPI :  HPI (Adult)  Stated Reason for Visit: L leg calf pain confirmed DVT    Chief Complaint  Chief Complaint   Patient presents with    Leg Pain       Admitting doctor:   Hermelinda Martin MD    Admitting diagnosis:   The encounter diagnosis was Acute deep vein thrombosis (DVT) of femoral vein of left lower extremity.    Code status:   Current Code Status       Date Active Code Status Order ID Comments User Context       6/4/2024 1748 CPR (Attempt to Resuscitate) [id number]  Hermelinda Martin MD ED        Question Answer    Code Status (Patient has no pulse and is not breathing) CPR (Attempt to Resuscitate)    Medical Interventions (Patient has pulse or is breathing) Full                    Allergies:   Hydrocodone    Isolation:   No active isolations    Intake and Output  No intake or output data in the 24 hours ending 06/04/24 1754    Weight:       06/04/24  1625   Weight: 70.3 kg (154 lb 15.7 oz)       Most recent vitals:   Vitals:    06/04/24 1625 06/04/24 1642   BP:  135/79   Pulse: 88    Resp: 19    Temp: 97.5 °F (36.4 °C)    SpO2: 99%    Weight: 70.3 kg (154 lb 15.7 oz)    Height: 160 cm (62.99\")        Active LDAs/IV Access:   Lines, Drains & Airways       Active LDAs       Name Placement date Placement time Site Days    Peripheral IV 06/04/24 1709 Anterior;Right Forearm 06/04/24  1709  Forearm  less than 1                    Labs (abnormal labs have a star):   Labs Reviewed   COMPREHENSIVE METABOLIC PANEL - Abnormal; Notable for the following components:       Result Value    Glucose 146 (*)     Albumin 3.4 (*)     Alkaline Phosphatase 137 (*)     All other components within normal limits    Narrative:     GFR Normal >60  Chronic Kidney Disease <60  Kidney Failure <15    The GFR formula is only valid for adults with stable renal function between ages 18 and 70.   CBC WITH AUTO DIFFERENTIAL - Abnormal; Notable for the following " components:    Neutrophil % 79.7 (*)     Lymphocyte % 12.0 (*)     Immature Grans % 0.6 (*)     Neutrophils, Absolute 7.78 (*)     Immature Grans, Absolute 0.06 (*)     All other components within normal limits   CBC AND DIFFERENTIAL    Narrative:     The following orders were created for panel order CBC & Differential.  Procedure                               Abnormality         Status                     ---------                               -----------         ------                     CBC Auto Differential[[id number]]        Abnormal            Final result                 Please view results for these tests on the individual orders.       EKG:   No orders to display       Meds given in ED:   Medications   acetaminophen (TYLENOL) tablet 650 mg (has no administration in time range)   ondansetron ODT (ZOFRAN-ODT) disintegrating tablet 4 mg (has no administration in time range)     Or   ondansetron (ZOFRAN) injection 4 mg (has no administration in time range)   melatonin tablet 2.5 mg (has no administration in time range)   sennosides-docusate (PERICOLACE) 8.6-50 MG per tablet 2 tablet (has no administration in time range)     And   polyethylene glycol (MIRALAX) packet 17 g (has no administration in time range)     And   bisacodyl (DULCOLAX) EC tablet 5 mg (has no administration in time range)     And   bisacodyl (DULCOLAX) suppository 10 mg (has no administration in time range)   Enoxaparin Sodium (LOVENOX) syringe 70 mg (has no administration in time range)   Enoxaparin Sodium (LOVENOX) syringe 70 mg (70 mg Subcutaneous Given 6/4/24 1746)       Imaging results:  No radiology results for the last day    Ambulatory status:   - Assist x 2, walker/ wheelchair    Social issues:   Social History     Socioeconomic History    Marital status:    Tobacco Use    Smoking status: Former     Types: Cigarettes     Passive exposure: Past    Smokeless tobacco: Never   Vaping Use    Vaping status: Never Used   Substance  and Sexual Activity    Alcohol use: Not Currently    Drug use: Never    Sexual activity: Not Currently       Peripheral Neurovascular  Peripheral Neurovascular (Adult)  Peripheral Neurovascular WDL: WDL, pulse assessment  Pulse Assessment: dorsalis pedis  Additional Documentation: Edema (Group)  Edema  Edema: foot, left, ankle, left  Ankle, Left Edema: 2+ (Mild)  Foot, Left Edema: 3+ (Moderate)    Neuro Cognitive  Neuro Cognitive (Adult)  Cognitive/Neuro/Behavioral WDL: WDL    Learning  Learning Assessment (Adult)  Learning Readiness and Ability: no barriers identified    Respiratory  Respiratory (Adult)  Airway WDL: WDL  Respiratory WDL  Respiratory WDL: WDL    Abdominal Pain       Pain Assessments  Pain (Adult)  (0-10) Pain Rating: Rest: 8    NIH Stroke Scale       Ibis Post RN  06/04/24 17:54 EDT

## 2024-06-04 NOTE — PROGRESS NOTES
"Chief Complaint  Leg Swelling    Subjective        Kirti Santillan presents to [facility]  History of Present Illness    Ms. Santillan presents to clinic today for follow-up and complains of new onset left leg swelling    She states that for about the past week she has had increased swelling of her left leg, localized from her foot all the way up to her upper knee.  She denies any redness of the area, fevers, chest pain or shortness of breath and denies any change in medications.  She was seen by pain medicine yesterday where she was told to proceed to the emergency room for evaluation however she declined.    She is following with pain medicine and received epidural she states that she has noticed some minimal improvement.  She continues to require assistance with about every activity of daily living, where her  has been assisting her with day-to-day activities however he states that he has had issues providing the proper care for her    Review of Systems   Constitutional:  Negative for fever.   Respiratory:  Negative for chest tightness and shortness of breath.    Cardiovascular:  Positive for leg swelling. Negative for chest pain and palpitations.   Musculoskeletal:  Positive for back pain and gait problem.   Neurological:  Positive for weakness and numbness. Negative for light-headedness.        Objective   Vital Signs:  /70   Pulse 98   Temp 97.1 °F (36.2 °C) (Temporal)   Ht 160 cm (63\")   Wt 70.3 kg (155 lb)   SpO2 97%   BMI 27.46 kg/m²   Estimated body mass index is 27.46 kg/m² as calculated from the following:    Height as of this encounter: 160 cm (63\").    Weight as of this encounter: 70.3 kg (155 lb).               Physical Exam  Vitals reviewed.   Constitutional:       General: She is not in acute distress.     Appearance: Normal appearance. She is ill-appearing. She is not toxic-appearing.   HENT:      Head: Normocephalic and atraumatic.   Eyes:      " General: No scleral icterus.     Conjunctiva/sclera: Conjunctivae normal.   Cardiovascular:      Rate and Rhythm: Normal rate and regular rhythm.   Musculoskeletal:      Right lower le+ Edema present.      Left lower leg: 3+ Pitting Edema present.   Skin:     General: Skin is warm and dry.   Neurological:      Mental Status: She is alert and oriented to person, place, and time.      Motor: Weakness present.      Gait: Gait abnormal.      Comments: Unable to stand without assistance, significantly weak with hip flexion and extension at left leg   Psychiatric:         Mood and Affect: Mood normal.         Behavior: Behavior normal.        Result Review :                   Assessment and Plan   Diagnoses and all orders for this visit:    1. Leg swelling (Primary)  -     Duplex Venous Lower Extremity - Left CAR  -     Comprehensive Metabolic Panel  -     CBC & Differential    2. Lumbar radiculopathy    3. Abdominal aneurysm  -     Ambulatory Referral to Vascular Surgery    4. Multiple falls  -     Ambulatory Referral to Home Health    5. Age-related physical debility  -     Ambulatory Referral to Home Health      Ms. Santillan presents to clinic today for follow-up and complaints of 1 week of left lower extremity edema.  Will plan to obtain stat duplex to rule out DVT, will obtain labs today as well.  Given that it is unilateral, will hold off on echo however she is at risk due to abnormal stress test that was obtained since last visit but she denies any new chest pain or shortness of breath    Unfortunately her health has progressively declined since hospitalization in October and establishing with me.  She continues to require assistance with day-to-day activities as well as mobility that has unfortunately worsened since back pain has worsened, where she is currently seeing pain medicine.  Today she had multiple wounds on her left hand and elbow that appear to be pressure wound as well as a fall.  I do believe that  she would greatly benefit from home health.  I will place referral    Additionally she missed her appointment with vascular surgery on May 6 which was scheduled for known infrarenal abdominal aneurysm that was discovered during hospitalization.  I will refer her to reestablish with vascular.    At some point we may need to discuss what interventions and further evaluation patient and  are willing to pursue, as she has declined intervention for her abnormal stress test and declined heart catheterization as well as intervention regarding her hypercalcemia and primary hyperparathyroidism    RTC in about 4-5 weeks, duplex today with labs            Follow Up   Return in about 5 weeks (around 7/9/2024).  Patient was given instructions and counseling regarding her condition or for health maintenance advice. Please see specific information pulled into the AVS if appropriate.

## 2024-06-04 NOTE — Clinical Note
Level of Care: Telemetry [5]   Diagnosis: DVT (deep venous thrombosis) [524571]   Admitting Physician: MIREILLE SUNSHINE [[id number]]   Attending Physician: MIREILLE SUNSHINE [[id number]]

## 2024-06-05 ENCOUNTER — APPOINTMENT (OUTPATIENT)
Dept: CT IMAGING | Facility: HOSPITAL | Age: 79
DRG: 301 | End: 2024-06-05
Payer: MEDICARE

## 2024-06-05 LAB
ALBUMIN SERPL-MCNC: 3.7 G/DL (ref 3.5–5.2)
ALBUMIN/GLOB SERPL: 1.4 G/DL
ALP SERPL-CCNC: 145 U/L (ref 39–117)
ALT SERPL-CCNC: 6 U/L (ref 1–33)
ANION GAP SERPL CALCULATED.3IONS-SCNC: 7 MMOL/L (ref 5–15)
AST SERPL-CCNC: 9 U/L (ref 1–32)
BASOPHILS # BLD AUTO: 0.04 10*3/MM3 (ref 0–0.2)
BASOPHILS NFR BLD AUTO: 0.5 % (ref 0–1.5)
BILIRUB SERPL-MCNC: 0.4 MG/DL (ref 0–1.2)
BUN SERPL-MCNC: 10 MG/DL (ref 8–23)
BUN SERPL-MCNC: 12 MG/DL (ref 8–23)
BUN/CREAT SERPL: 14.5 (ref 7–25)
BUN/CREAT SERPL: 17.5 (ref 7–25)
CALCIUM SERPL-MCNC: 10.5 MG/DL (ref 8.6–10.5)
CALCIUM SPEC-SCNC: 9.6 MG/DL (ref 8.6–10.5)
CHLORIDE SERPL-SCNC: 103 MMOL/L (ref 98–107)
CHLORIDE SERPL-SCNC: 98 MMOL/L (ref 98–107)
CO2 SERPL-SCNC: 26.2 MMOL/L (ref 22–29)
CO2 SERPL-SCNC: 27 MMOL/L (ref 22–29)
CREAT SERPL-MCNC: 0.57 MG/DL (ref 0.57–1)
CREAT SERPL-MCNC: 0.83 MG/DL (ref 0.57–1)
DEPRECATED RDW RBC AUTO: 44 FL (ref 37–54)
EGFRCR SERPLBLD CKD-EPI 2021: 71.8 ML/MIN/1.73
EGFRCR SERPLBLD CKD-EPI 2021: 92.6 ML/MIN/1.73
EOSINOPHIL # BLD AUTO: 0.09 10*3/MM3 (ref 0–0.4)
EOSINOPHIL NFR BLD AUTO: 1.1 % (ref 0.3–6.2)
ERYTHROCYTE [DISTWIDTH] IN BLOOD BY AUTOMATED COUNT: 13.7 % (ref 12.3–15.4)
ERYTHROCYTE [DISTWIDTH] IN BLOOD BY AUTOMATED COUNT: 13.8 % (ref 12.3–15.4)
GLOBULIN SER CALC-MCNC: 2.6 GM/DL
GLUCOSE SERPL-MCNC: 118 MG/DL (ref 65–99)
GLUCOSE SERPL-MCNC: 218 MG/DL (ref 65–99)
HCT VFR BLD AUTO: 36.7 % (ref 34–46.6)
HCT VFR BLD AUTO: 42.3 % (ref 34–46.6)
HGB BLD-MCNC: 11.5 G/DL (ref 12–15.9)
HGB BLD-MCNC: 13.3 G/DL (ref 12–15.9)
IMM GRANULOCYTES # BLD AUTO: 0.04 10*3/MM3 (ref 0–0.05)
IMM GRANULOCYTES NFR BLD AUTO: 0.5 % (ref 0–0.5)
LYMPHOCYTES # BLD AUTO: 1.16 10*3/MM3 (ref 0.7–3.1)
LYMPHOCYTES NFR BLD AUTO: 14.5 % (ref 19.6–45.3)
MCH RBC QN AUTO: 27.3 PG (ref 26.6–33)
MCH RBC QN AUTO: 27.7 PG (ref 26.6–33)
MCHC RBC AUTO-ENTMCNC: 31.3 G/DL (ref 31.5–35.7)
MCHC RBC AUTO-ENTMCNC: 31.4 G/DL (ref 31.5–35.7)
MCV RBC AUTO: 87 FL (ref 79–97)
MCV RBC AUTO: 88.1 FL (ref 79–97)
MONOCYTES # BLD AUTO: 0.44 10*3/MM3 (ref 0.1–0.9)
MONOCYTES NFR BLD AUTO: 5.5 % (ref 5–12)
NEUTROPHILS # BLD AUTO: 6.25 10*3/MM3 (ref 1.7–7)
NEUTROPHILS NFR BLD AUTO: 77.9 % (ref 42.7–76)
NRBC BLD AUTO-RTO: 0 /100 WBC (ref 0–0.2)
PLATELET # BLD AUTO: 221 10*3/MM3 (ref 140–450)
PLATELET # BLD AUTO: 277 10*3/MM3 (ref 140–450)
PMV BLD AUTO: 10.7 FL (ref 6–12)
POTASSIUM SERPL-SCNC: 3.7 MMOL/L (ref 3.5–5.2)
POTASSIUM SERPL-SCNC: 4 MMOL/L (ref 3.5–5.2)
PROT SERPL-MCNC: 6.3 G/DL (ref 6–8.5)
RBC # BLD AUTO: 4.22 10*6/MM3 (ref 3.77–5.28)
RBC # BLD AUTO: 4.8 10*6/MM3 (ref 3.77–5.28)
SODIUM SERPL-SCNC: 137 MMOL/L (ref 136–145)
SODIUM SERPL-SCNC: 139 MMOL/L (ref 136–145)
WBC # BLD AUTO: 8.02 10*3/MM3 (ref 3.4–10.8)
WBC NRBC COR # BLD AUTO: 7.18 10*3/MM3 (ref 3.4–10.8)

## 2024-06-05 PROCEDURE — 80048 BASIC METABOLIC PNL TOTAL CA: CPT | Performed by: INTERNAL MEDICINE

## 2024-06-05 PROCEDURE — 85027 COMPLETE CBC AUTOMATED: CPT | Performed by: INTERNAL MEDICINE

## 2024-06-05 PROCEDURE — 25010000002 ENOXAPARIN PER 10 MG: Performed by: INTERNAL MEDICINE

## 2024-06-05 PROCEDURE — 36415 COLL VENOUS BLD VENIPUNCTURE: CPT | Performed by: INTERNAL MEDICINE

## 2024-06-05 RX ORDER — GABAPENTIN 100 MG/1
200 CAPSULE ORAL 2 TIMES DAILY
Status: DISCONTINUED | OUTPATIENT
Start: 2024-06-05 | End: 2024-06-08 | Stop reason: HOSPADM

## 2024-06-05 RX ADMIN — ASPIRIN 81 MG: 81 TABLET, COATED ORAL at 10:06

## 2024-06-05 RX ADMIN — APIXABAN 10 MG: 5 TABLET, FILM COATED ORAL at 20:18

## 2024-06-05 RX ADMIN — ATORVASTATIN CALCIUM 10 MG: 20 TABLET, FILM COATED ORAL at 10:06

## 2024-06-05 RX ADMIN — Medication 2.5 MG: at 20:18

## 2024-06-05 RX ADMIN — ENOXAPARIN SODIUM 70 MG: 100 INJECTION SUBCUTANEOUS at 03:47

## 2024-06-05 RX ADMIN — GABAPENTIN 100 MG: 100 CAPSULE ORAL at 10:06

## 2024-06-05 RX ADMIN — GABAPENTIN 200 MG: 100 CAPSULE ORAL at 20:18

## 2024-06-05 NOTE — PLAN OF CARE
Problem: Adult Inpatient Plan of Care  Goal: Plan of Care Review  Outcome: Ongoing, Progressing  Flowsheets (Taken 6/5/2024 0356)  Plan of Care Reviewed With: patient  Goal: Patient-Specific Goal (Individualized)  Outcome: Ongoing, Progressing  Goal: Absence of Hospital-Acquired Illness or Injury  Outcome: Ongoing, Progressing  Intervention: Identify and Manage Fall Risk  Recent Flowsheet Documentation  Taken 6/5/2024 0352 by Eil Nelson RN  Safety Promotion/Fall Prevention:   activity supervised   assistive device/personal items within reach   clutter free environment maintained   safety round/check completed  Taken 6/5/2024 0158 by Eli Nelson RN  Safety Promotion/Fall Prevention:   activity supervised   assistive device/personal items within reach   clutter free environment maintained   safety round/check completed  Taken 6/5/2024 0000 by Eli Nelson RN  Safety Promotion/Fall Prevention:   activity supervised   assistive device/personal items within reach   clutter free environment maintained   safety round/check completed  Taken 6/4/2024 2200 by Eli Nelson RN  Safety Promotion/Fall Prevention:   activity supervised   assistive device/personal items within reach   clutter free environment maintained   safety round/check completed  Taken 6/4/2024 2000 by Eli Nelson RN  Safety Promotion/Fall Prevention: safety round/check completed  Intervention: Prevent Skin Injury  Recent Flowsheet Documentation  Taken 6/5/2024 0352 by Eli Nelson RN  Body Position: position changed independently  Taken 6/5/2024 0158 by Eli Nelson RN  Body Position: position changed independently  Taken 6/5/2024 0000 by Eli Nelson RN  Body Position: position changed independently  Taken 6/4/2024 2200 by Eli Nelson RN  Body Position: position changed independently  Taken 6/4/2024 2000 by Eli Nelson RN  Body Position: position changed independently  Skin Protection: adhesive use limited  Intervention:  Prevent and Manage VTE (Venous Thromboembolism) Risk  Recent Flowsheet Documentation  Taken 6/4/2024 2000 by Eli Nelson RN  VTE Prevention/Management: (lovanox) other (see comments)  Range of Motion: active ROM (range of motion) encouraged  Intervention: Prevent Infection  Recent Flowsheet Documentation  Taken 6/4/2024 2000 by Eli Nelson RN  Infection Prevention: single patient room provided  Goal: Optimal Comfort and Wellbeing  Outcome: Ongoing, Progressing  Intervention: Provide Person-Centered Care  Recent Flowsheet Documentation  Taken 6/4/2024 2000 by Eli Nelson RN  Trust Relationship/Rapport:   care explained   choices provided  Goal: Readiness for Transition of Care  Outcome: Ongoing, Progressing  Intervention: Mutually Develop Transition Plan  Recent Flowsheet Documentation  Taken 6/4/2024 2007 by Eli Nelson RN  Equipment Currently Used at Home: walker, rolling  Taken 6/4/2024 2006 by Eli Nelson RN  Transportation Anticipated: family or friend will provide  Patient/Family Anticipated Services at Transition: none  Patient/Family Anticipates Transition to: home     Problem: Fall Injury Risk  Goal: Absence of Fall and Fall-Related Injury  Outcome: Ongoing, Progressing  Intervention: Identify and Manage Contributors  Recent Flowsheet Documentation  Taken 6/5/2024 0352 by Eli Nelson RN  Medication Review/Management: medications reviewed  Taken 6/5/2024 0158 by Eli Nelson RN  Medication Review/Management: medications reviewed  Taken 6/5/2024 0000 by Eli Nelson RN  Medication Review/Management: medications reviewed  Taken 6/4/2024 2200 by Eli Nelson RN  Medication Review/Management: medications reviewed  Taken 6/4/2024 2000 by Eli Nelson RN  Medication Review/Management: medications reviewed  Intervention: Promote Injury-Free Environment  Recent Flowsheet Documentation  Taken 6/5/2024 0352 by Eli Nelson RN  Safety Promotion/Fall Prevention:   activity  supervised   assistive device/personal items within reach   clutter free environment maintained   safety round/check completed  Taken 6/5/2024 0158 by Eli Nelson RN  Safety Promotion/Fall Prevention:   activity supervised   assistive device/personal items within reach   clutter free environment maintained   safety round/check completed  Taken 6/5/2024 0000 by Eli Nelson RN  Safety Promotion/Fall Prevention:   activity supervised   assistive device/personal items within reach   clutter free environment maintained   safety round/check completed  Taken 6/4/2024 2200 by Eli Nelson RN  Safety Promotion/Fall Prevention:   activity supervised   assistive device/personal items within reach   clutter free environment maintained   safety round/check completed  Taken 6/4/2024 2000 by Eli Nelson RN  Safety Promotion/Fall Prevention: safety round/check completed     Problem: VTE (Venous Thromboembolism)  Goal: VTE (Venous Thromboembolism) Symptom Resolution  Outcome: Ongoing, Progressing  Intervention: Prevent or Manage VTE (Venous Thromboembolism)  Recent Flowsheet Documentation  Taken 6/4/2024 2000 by Eli Nelson RN  VTE Prevention/Management: (lovanox) other (see comments)   Goal Outcome Evaluation:  Plan of Care Reviewed With: patient

## 2024-06-05 NOTE — H&P
HISTORY AND PHYSICAL   [facility]        Date of Admission: 2024  Patient Identification:  Name: Kirti Santillan  Age: 78 y.o.  Sex: female  :  1945  MRN: [id number]                     Primary Care Physician: Deangelo Sprague MD    Chief Complaint:  78 year old female presented to the emergency room with swelling of her left leg which started a week ago; she was seen by her pcp and sent for an ultrasound which revealed a dvt; she denies shortness of breath or chest pain; no personal or family history of dvt; she has a history of worsening back pain and requires a lot of assistance with her adls    History of Present Illness:   As above    Past Medical History:  Past Medical History:   Diagnosis Date    High blood pressure 11/3/2023    Low back pain     Lumbar radiculopathy 2023    Sciatica of left side      Past Surgical History:  Past Surgical History:   Procedure Laterality Date    EPIDURAL BLOCK      EYE SURGERY      LUMBAR EPIDURAL INJECTION N/A 2024    Procedure: LUMBAR EPIDURAL 1ST VISIT L4-5;  Surgeon: Nikole Baig MD;  [place] [facility];  Service: Pain Management;  Laterality: N/A;      Home Meds:  Medications Prior to Admission   Medication Sig Dispense Refill Last Dose    aspirin 81 MG EC tablet Take 1 tablet by mouth Daily for 90 days. 90 tablet 3 2024    atorvastatin (LIPITOR) 10 MG tablet Take 1 tablet by mouth Daily for 90 days. 90 tablet 3 2024    CBD (cannabidiol) oral oil Take 0.5 mL by mouth Daily. 2400 mg, 40 mg per mL  Indications: pain   2024    gabapentin (NEURONTIN) 100 MG capsule Take 1 capsule by mouth 2 (Two) Times a Day.   2024    Ibuprofen (Advil) 200 MG capsule Take 800 mg by mouth Daily. Indications: Backache, pain   2024    gabapentin (NEURONTIN) 100 MG capsule TAKE 1 CAPSULE BY MOUTH TWICE A  capsule 0     vitamin D (ERGOCALCIFEROL) 1.25 MG (22290 UT) capsule capsule Take 1 capsule by mouth 1 (One) Time Per  Week. 5 capsule 3        Allergies:  Allergies   Allergen Reactions    Hydrocodone Nausea And Vomiting     Immunizations:  Immunization History   Administered Date(s) Administered    COVID-19 (PFIZER) Purple Cap Monovalent 2021, 2021, 2021    Fluzone High-Dose 65+yrs 2023    Pneumococcal Conjugate 20-Valent (PCV20) 2024     Social History:   Social History     Social History Narrative    Not on file     Social History     Socioeconomic History    Marital status:    Tobacco Use    Smoking status: Former     Types: Cigarettes     Passive exposure: Past    Smokeless tobacco: Never   Vaping Use    Vaping status: Never Used   Substance and Sexual Activity    Alcohol use: Not Currently    Drug use: Never    Sexual activity: Not Currently       Family History:  History reviewed. No pertinent family history.     Review of Systems  See history of present illness and past medical history.  Patient denies headache, dizziness, syncope, falls, trauma, change in vision, change in hearing, change in taste, changes in weight, changes in appetite, focal weakness, numbness, or paresthesia.  Patient denies chest pain, palpitations, dyspnea, orthopnea, PND, cough, sinus pressure, rhinorrhea, epistaxis, hemoptysis, nausea, vomiting,hematemesis, diarrhea, constipation or hematochezia.  Denies cold or heat intolerance, polydipsia, polyuria, polyphagia. Denies hematuria, pyuria, dysuria, hesitancy, frequency or urgency. Denies consumption of raw and under cooked meats foods or change in water source.  Denies fever, chills, sweats, night sweats.       Objective:  T Max 24 hrs: Temp (24hrs), Av.4 °F (36.3 °C), Min:97.1 °F (36.2 °C), Max:97.7 °F (36.5 °C)    Vitals Ranges:   Temp:  [97.1 °F (36.2 °C)-97.7 °F (36.5 °C)] 97.7 °F (36.5 °C)  Heart Rate:  [75-98] 83  Resp:  [16-19] 16  BP: (112-138)/(68-89) 138/79      Exam:  /79 (BP Location: Left arm, Patient Position: Lying)   Pulse 83   Temp  "97.7 °F (36.5 °C) (Oral)   Resp 16   Ht 160 cm (63\")   Wt 70.3 kg (154 lb 15.7 oz)   LMP  (LMP Unknown)   SpO2 96%   BMI 27.45 kg/m²     General Appearance:    Alert, cooperative, no distress, appears stated age   Head:    Normocephalic, without obvious abnormality, atraumatic   Eyes:    PERRL, conjunctivae/corneas clear, EOM's intact, both eyes   Ears:    Normal external ear canals, both ears   Nose:   Nares normal, septum midline, mucosa normal, no drainage    or sinus tenderness   Throat:   Lips, mucosa, and tongue normal   Neck:   Supple, symmetrical, trachea midline, no adenopathy;     thyroid:  no enlargement/tenderness/nodules; no carotid    bruit or JVD   Back:     Symmetric, no curvature, ROM normal, no CVA tenderness   Lungs:     Clear to auscultation bilaterally, respirations unlabored   Chest Wall:    No tenderness or deformity    Heart:    Regular rate and rhythm, S1 and S2 normal, no murmur, rub   or gallop   Abdomen:     Soft, nontender, bowel sounds active all four quadrants,     no masses, no hepatomegaly, no splenomegaly   Extremities:   Extremities normal, atraumatic, no cyanosis or edema                       .    Data Review:  Labs in chart were reviewed.  WBC   Date Value Ref Range Status   06/04/2024 9.77 3.40 - 10.80 10*3/mm3 Final     Hemoglobin   Date Value Ref Range Status   06/04/2024 13.4 12.0 - 15.9 g/dL Final     Hematocrit   Date Value Ref Range Status   06/04/2024 42.1 34.0 - 46.6 % Final     Platelets   Date Value Ref Range Status   06/04/2024 259 140 - 450 10*3/mm3 Final     Sodium   Date Value Ref Range Status   06/04/2024 136 136 - 145 mmol/L Final     Potassium   Date Value Ref Range Status   06/04/2024 4.0 3.5 - 5.2 mmol/L Final     Chloride   Date Value Ref Range Status   06/04/2024 101 98 - 107 mmol/L Final     CO2   Date Value Ref Range Status   06/04/2024 26.0 22.0 - 29.0 mmol/L Final     BUN   Date Value Ref Range Status   06/04/2024 13 8 - 23 mg/dL Final "     Creatinine   Date Value Ref Range Status   06/04/2024 0.77 0.57 - 1.00 mg/dL Final     Glucose   Date Value Ref Range Status   06/04/2024 146 (H) 65 - 99 mg/dL Final     Calcium   Date Value Ref Range Status   06/04/2024 10.3 8.6 - 10.5 mg/dL Final     AST (SGOT)   Date Value Ref Range Status   06/04/2024 6 1 - 32 U/L Final     ALT (SGPT)   Date Value Ref Range Status   06/04/2024 7 1 - 33 U/L Final     Alkaline Phosphatase   Date Value Ref Range Status   06/04/2024 137 (H) 39 - 117 U/L Final                Imaging Results (All)       None              Assessment:  Active Hospital Problems    Diagnosis  POA    **DVT (deep venous thrombosis) [I82.409]  Yes      Resolved Hospital Problems   No resolved problems to display.   Back pain  Hyperglycemia      Plan:  Check cta chest  Continue lovenox  Monitor on telemetry  Trend blood sugar  Dw patient and ed provider    Hermelinda Martin MD  6/4/2024  21:49 EDT

## 2024-06-05 NOTE — DISCHARGE PLACEMENT REQUEST
"Kirti Gardiner (78 y.o. Female)       Date of Birth   1945    Social Security Number       Address   [address] [place] [address]    Home Phone   [phone]    MRN   [id number]       [employer]   [employer]    Marital Status                               Admission Date   6/4/24    Admission Type   Emergency    Admitting Provider   Hermelinda Martin MD    Attending Provider   Darci Williamson MD    Department, Room/Bed   [address], [address]/1       Discharge Date       Discharge Disposition       Discharge Destination                                 Attending Provider: Darci Williamson MD    Allergies: Hydrocodone    Isolation: None   Infection: None   Code Status: CPR    Ht: 160 cm (63\")   Wt: 70.2 kg (154 lb 12.2 oz)    Admission Cmt: None   Principal Problem: DVT (deep venous thrombosis) [I82.409]                   Active Insurance as of 6/4/2024       Primary Coverage       Payor Plan Insurance Group Employer/Plan Group    MEDICARE MEDICARE A & B        Payor Plan Address Payor Plan Phone Number Payor Plan Fax Number Effective Dates    [address] [phone]  11/1/2009 - None Entered    [facility] [id number]         Subscriber Name Subscriber Birth Date Member ID       KIRTI GARDINER 1945 [id number]               Secondary Coverage       Payor Plan Insurance Group Employer/Plan Group    [employer] SUP AAR HEALTH CARE OPTIONS        Payor Plan Address Payor Plan Phone Number Payor Plan Fax Number Effective Dates    [facility] [phone]  1/1/2023 - None Entered    [address]       [facility] [address]         Subscriber Name Subscriber Birth Date Member ID       KIRTI GARDINER 1945 [id number]                     Emergency Contacts        (Rel.) Home Phone Work Phone Mobile Phone    Gardiner,Don (Spouse) -- -- [phone]    Robinson Gardiner (Son) [phone] -- --                "

## 2024-06-05 NOTE — CASE MANAGEMENT/SOCIAL WORK
Continued Stay Note  [facility]     Patient Name: Kirti Santillan  MRN: [id number]  Today's Date: 6/5/2024    Admit Date: 6/4/2024    Plan: Home with family, [facility] referral pending. PT eval pending.   Discharge Plan       Row Name 06/05/24 1401       Plan    Plan Home with family, [facility] referral pending. PT eval pending.    Patient/Family in Agreement with Plan yes    Plan Comments CCP met with pt and spouse Don at the bedside, introduced self and role of CCP. Pt gave CCP permission to speak in front of Don. Face sheet information and pharmacy verified. Pt lives in a single-story home with a ramp to enter with her  Kenny and her son Robinson. Pt does not drive, needs assistance with all ADL's and pt has a cane, walker and rollator at home. Pt has a living will. Pt is enrolled in meds to beds and denies trouble affording or managing her medications. Pt denies HH history and has been to [facility] in the past. Pt states her PCP was arranging HH but unsure of agency used. CCP can make a referral to HH agency if agreeable; pt is agreeable to denies preference of agency. [facility] notified of referral. PT eval pending. Yosvany KOLB/CCP                   Discharge Codes    No documentation.                 Expected Discharge Date and Time       Expected Discharge Date Expected Discharge Time    Jun 7, 2024               Jacqui Calderon RN

## 2024-06-05 NOTE — PROGRESS NOTES
Name: Kirti Santillan ADMIT: 2024   : 1945  PCP: Deangelo Sprague MD    MRN: [id number] LOS: 0 days   AGE/SEX: 78 y.o. female  ROOM: [facility]     Subjective   Subjective     No chest pain, no shortness of breath, no left lower extremity pain.  He is on room air.  She has no clear indication for a CTA of her chest as she is already anticoagulated for left lower extremity DVT, and I have called radiology and asked them to cancel the study.       Objective   Objective   Vital Signs  Temp:  [97.1 °F (36.2 °C)-98.1 °F (36.7 °C)] 97.7 °F (36.5 °C)  Heart Rate:  [75-98] 81  Resp:  [16-19] 16  BP: (112-141)/() 136/79  SpO2:  [95 %-99 %] 95 %  on   ;   Device (Oxygen Therapy): room air  Body mass index is 27.42 kg/m².  Physical Exam  Constitutional:       General: She is not in acute distress.     Appearance: She is not toxic-appearing.   Cardiovascular:      Rate and Rhythm: Normal rate and regular rhythm.      Heart sounds: Normal heart sounds.   Pulmonary:      Effort: Pulmonary effort is normal.      Breath sounds: Normal breath sounds.   Abdominal:      General: Bowel sounds are normal.      Palpations: Abdomen is soft.   Musculoskeletal:         General: No tenderness.      Right lower leg: No edema.      Left lower leg: Edema present.   Neurological:      Mental Status: She is alert.   Psychiatric:         Mood and Affect: Mood normal.         Behavior: Behavior normal.         Results Review     I reviewed the patient's new clinical results.  Results from last 7 days   Lab Units 24  0342 24  17024  1337   WBC 10*3/mm3 7.18 9.77 8.02   HEMOGLOBIN g/dL 11.5* 13.4 13.3   PLATELETS 10*3/mm3 221 259 277     Results from last 7 days   Lab Units 24  0342 24  1708 24  1337   SODIUM mmol/L 137 136 139   POTASSIUM mmol/L 3.7 4.0 4.0   CHLORIDE mmol/L 103 101 98   CO2 mmol/L 27.0 26.0 26.2   BUN mg/dL 10 13 12   CREATININE mg/dL 0.57 0.77 0.83   GLUCOSE mg/dL 118* 146*  "218*   Estimated Creatinine Clearance: 75.2 mL/min (by C-G formula based on SCr of 0.57 mg/dL).  Results from last 7 days   Lab Units 06/04/24  1708 06/04/24  1337   ALBUMIN g/dL 3.4* 3.7   BILIRUBIN mg/dL 0.4 0.4   ALK PHOS U/L 137* 145*   AST (SGOT) U/L 6 9   ALT (SGPT) U/L 7 6     Results from last 7 days   Lab Units 06/05/24  0342 06/04/24  1708 06/04/24  1337   CALCIUM mg/dL 9.6 10.3 10.5   ALBUMIN g/dL  --  3.4* 3.7     No results found for: \"COVID19\"  No results found for: \"HGBA1C\", \"POCGLU\"    Duplex Venous Lower Extremity - Left CAR    Acute left lower extremity deep vein thrombosis noted in the common   iliac, external iliac, common femoral, deep femoral, proximal femoral, mid   femoral, distal femoral, popliteal and gastrocnemius.  IVC is patent   without evidence of thrombus.  Right common iliac is patent without   evidence of thrombus.    Acute left lower extremity superficial thrombophlebitis noted in the   saphenofemoral junction and great saphenous (above knee).    Incidental finding of 3.7 cm infrarenal abdominal aortic aneurysm.    Scheduled Medications  aspirin, 81 mg, Oral, Daily  atorvastatin, 10 mg, Oral, Daily  enoxaparin, 1 mg/kg, Subcutaneous, Q12H  gabapentin, 100 mg, Oral, BID    Infusions   Diet  Diet: Cardiac; Healthy Heart (2-3 Na+); Fluid Consistency: Thin (IDDSI 0)       Assessment/Plan     Active Hospital Problems    Diagnosis  POA    **DVT (deep venous thrombosis) [I82.409]  Yes    Abnormal nuclear stress test [R94.39]  Yes    Prediabetes [R73.03]  Yes    Abdominal aneurysm [I71.40]  Yes      Resolved Hospital Problems   No resolved problems to display.       78 y.o. female admitted with DVT (deep venous thrombosis).    Acute left lower extremity DVT-I think this is provoked by immobility. Currently on therapeutic lovenox and on room air.  No chest discomfort.  It is not clear that the patient has a pulmonary embolism based on her symptoms, but regardless there is no indication to " check a CTA at this time.  I have canceled the study.  I will transition her from therapeutic Lovenox to Eliquis today.  Abnormal stress test with presume coronary artery disease-asa/statin. No plans for cardiac catheterization per her last cardiology note  Essential hypertension-no longer on amlodipine and bp is adequately controlled for her age  Hyperlipidemia-statin  Primary hyperparathyroidism   AAA  Lumbar spinal stenosis   Age related physical debility-physical and occupational therapy have been consulted to help us determine disposition   Lovenox transitioning to eliquis  for DVT prophylaxis.  Full code.  Discussed with patient, spouse, and nursing staff.  Anticipate discharge  TBD  once arrangements have been made.      Darci Williamson MD  [facility]  06/05/24  11:33 EDT    I wore protective equipment throughout this patient encounter including a face mask, gloves and protective eyewear.  Hand hygiene was performed before donning protective equipment and after removal when leaving the room.

## 2024-06-05 NOTE — PLAN OF CARE
Problem: Adult Inpatient Plan of Care  Goal: Plan of Care Review  Outcome: Ongoing, Not Progressing  Flowsheets (Taken 6/5/2024 1924)  Progress: improving  Plan of Care Reviewed With: patient  Goal: Patient-Specific Goal (Individualized)  Outcome: Ongoing, Not Progressing  Goal: Absence of Hospital-Acquired Illness or Injury  Outcome: Ongoing, Not Progressing  Intervention: Identify and Manage Fall Risk  Recent Flowsheet Documentation  Taken 6/5/2024 1800 by Osmin Martinez RN  Safety Promotion/Fall Prevention:   activity supervised   assistive device/personal items within reach   clutter free environment maintained   toileting scheduled   safety round/check completed   room organization consistent   nonskid shoes/slippers when out of bed   fall prevention program maintained  Taken 6/5/2024 1600 by Osmin Martinez RN  Safety Promotion/Fall Prevention:   activity supervised   assistive device/personal items within reach   clutter free environment maintained   toileting scheduled   safety round/check completed   room organization consistent   nonskid shoes/slippers when out of bed   fall prevention program maintained  Taken 6/5/2024 1420 by Osmin Martinez RN  Safety Promotion/Fall Prevention:   activity supervised   assistive device/personal items within reach   clutter free environment maintained   toileting scheduled   safety round/check completed   room organization consistent   nonskid shoes/slippers when out of bed   fall prevention program maintained  Taken 6/5/2024 1243 by Osmin Martinez RN  Safety Promotion/Fall Prevention:   activity supervised   assistive device/personal items within reach   clutter free environment maintained   toileting scheduled   safety round/check completed   room organization consistent   nonskid shoes/slippers when out of bed   fall prevention program maintained  Taken 6/5/2024 1001 by Osmin Martinez, RN  Safety Promotion/Fall Prevention:   activity supervised   assistive  device/personal items within reach   clutter free environment maintained   toileting scheduled   room organization consistent   safety round/check completed   nonskid shoes/slippers when out of bed   fall prevention program maintained  Taken 6/5/2024 0841 by Osmin Martinez RN  Safety Promotion/Fall Prevention:   activity supervised   assistive device/personal items within reach   clutter free environment maintained   toileting scheduled   room organization consistent   safety round/check completed   nonskid shoes/slippers when out of bed   fall prevention program maintained  Intervention: Prevent Skin Injury  Recent Flowsheet Documentation  Taken 6/5/2024 1800 by Osmin Martinez RN  Body Position: position changed independently  Taken 6/5/2024 1600 by Osmin Martinez RN  Body Position:   position changed independently   supine  Taken 6/5/2024 1420 by Osmin Martinez RN  Body Position: position changed independently  Taken 6/5/2024 1243 by Osmin Martinez RN  Body Position: position changed independently  Taken 6/5/2024 1001 by Osmin Martinez RN  Body Position:   position changed independently   sitting up in bed  Taken 6/5/2024 0841 by Osmin Martinez RN  Body Position:   position changed independently   sitting up in bed  Intervention: Prevent and Manage VTE (Venous Thromboembolism) Risk  Recent Flowsheet Documentation  Taken 6/5/2024 0841 by Osmin Martinez RN  VTE Prevention/Management: (lovenox) other (see comments)  Intervention: Prevent Infection  Recent Flowsheet Documentation  Taken 6/5/2024 1800 by Osmin Martinez RN  Infection Prevention:   hand hygiene promoted   rest/sleep promoted  Taken 6/5/2024 1600 by Osmin Martinez RN  Infection Prevention:   hand hygiene promoted   rest/sleep promoted  Taken 6/5/2024 1420 by Osmin Martinez RN  Infection Prevention:   hand hygiene promoted   rest/sleep promoted  Taken 6/5/2024 1243 by Osmin Martinez RN  Infection Prevention:   hand hygiene  promoted   rest/sleep promoted  Taken 6/5/2024 1001 by Osmin Martinez RN  Infection Prevention:   hand hygiene promoted   rest/sleep promoted  Taken 6/5/2024 0841 by Osmin Martinez RN  Infection Prevention:   hand hygiene promoted   rest/sleep promoted  Goal: Optimal Comfort and Wellbeing  Outcome: Ongoing, Not Progressing  Goal: Readiness for Transition of Care  Outcome: Ongoing, Not Progressing     Problem: Fall Injury Risk  Goal: Absence of Fall and Fall-Related Injury  Outcome: Ongoing, Not Progressing  Intervention: Identify and Manage Contributors  Recent Flowsheet Documentation  Taken 6/5/2024 1800 by Osmin Martinez RN  Medication Review/Management: medications reviewed  Taken 6/5/2024 1600 by Osmin Martinez RN  Medication Review/Management: medications reviewed  Taken 6/5/2024 1420 by Osmin Martinez RN  Medication Review/Management: medications reviewed  Taken 6/5/2024 1243 by Osmin Martinez RN  Medication Review/Management: medications reviewed  Taken 6/5/2024 1001 by Osmin Martinez RN  Medication Review/Management: medications reviewed  Taken 6/5/2024 0841 by Osmin Martinez RN  Medication Review/Management: medications reviewed  Intervention: Promote Injury-Free Environment  Recent Flowsheet Documentation  Taken 6/5/2024 1800 by Osmin Martinez RN  Safety Promotion/Fall Prevention:   activity supervised   assistive device/personal items within reach   clutter free environment maintained   toileting scheduled   safety round/check completed   room organization consistent   nonskid shoes/slippers when out of bed   fall prevention program maintained  Taken 6/5/2024 1600 by Osmin Martinez RN  Safety Promotion/Fall Prevention:   activity supervised   assistive device/personal items within reach   clutter free environment maintained   toileting scheduled   safety round/check completed   room organization consistent   nonskid shoes/slippers when out of bed   fall prevention program  maintained  Taken 6/5/2024 1420 by Osmin Martinez RN  Safety Promotion/Fall Prevention:   activity supervised   assistive device/personal items within reach   clutter free environment maintained   toileting scheduled   safety round/check completed   room organization consistent   nonskid shoes/slippers when out of bed   fall prevention program maintained  Taken 6/5/2024 1243 by Osmin Martinez RN  Safety Promotion/Fall Prevention:   activity supervised   assistive device/personal items within reach   clutter free environment maintained   toileting scheduled   safety round/check completed   room organization consistent   nonskid shoes/slippers when out of bed   fall prevention program maintained  Taken 6/5/2024 1001 by Osmin Martinez RN  Safety Promotion/Fall Prevention:   activity supervised   assistive device/personal items within reach   clutter free environment maintained   toileting scheduled   room organization consistent   safety round/check completed   nonskid shoes/slippers when out of bed   fall prevention program maintained  Taken 6/5/2024 0841 by Osmin Martinez RN  Safety Promotion/Fall Prevention:   activity supervised   assistive device/personal items within reach   clutter free environment maintained   toileting scheduled   room organization consistent   safety round/check completed   nonskid shoes/slippers when out of bed   fall prevention program maintained     Problem: VTE (Venous Thromboembolism)  Goal: VTE (Venous Thromboembolism) Symptom Resolution  Outcome: Ongoing, Not Progressing  Intervention: Prevent or Manage VTE (Venous Thromboembolism)  Recent Flowsheet Documentation  Taken 6/5/2024 0841 by Osmin Martinez RN  VTE Prevention/Management: (lovenox) other (see comments)   Goal Outcome Evaluation:  Plan of Care Reviewed With: patient        Progress: improving

## 2024-06-06 LAB
ANION GAP SERPL CALCULATED.3IONS-SCNC: 6.9 MMOL/L (ref 5–15)
BUN SERPL-MCNC: 10 MG/DL (ref 8–23)
BUN/CREAT SERPL: 11.5 (ref 7–25)
CALCIUM SPEC-SCNC: 9.8 MG/DL (ref 8.6–10.5)
CHLORIDE SERPL-SCNC: 101 MMOL/L (ref 98–107)
CO2 SERPL-SCNC: 27.1 MMOL/L (ref 22–29)
CREAT SERPL-MCNC: 0.87 MG/DL (ref 0.57–1)
DEPRECATED RDW RBC AUTO: 41.2 FL (ref 37–54)
EGFRCR SERPLBLD CKD-EPI 2021: 67.9 ML/MIN/1.73
ERYTHROCYTE [DISTWIDTH] IN BLOOD BY AUTOMATED COUNT: 13.5 % (ref 12.3–15.4)
GLUCOSE BLDC GLUCOMTR-MCNC: 123 MG/DL (ref 70–130)
GLUCOSE SERPL-MCNC: 116 MG/DL (ref 65–99)
HCT VFR BLD AUTO: 36.6 % (ref 34–46.6)
HGB BLD-MCNC: 11.7 G/DL (ref 12–15.9)
MCH RBC QN AUTO: 27.3 PG (ref 26.6–33)
MCHC RBC AUTO-ENTMCNC: 32 G/DL (ref 31.5–35.7)
MCV RBC AUTO: 85.5 FL (ref 79–97)
PLATELET # BLD AUTO: 241 10*3/MM3 (ref 140–450)
PMV BLD AUTO: 10.8 FL (ref 6–12)
POTASSIUM SERPL-SCNC: 4.2 MMOL/L (ref 3.5–5.2)
RBC # BLD AUTO: 4.28 10*6/MM3 (ref 3.77–5.28)
SODIUM SERPL-SCNC: 135 MMOL/L (ref 136–145)
WBC NRBC COR # BLD AUTO: 7.73 10*3/MM3 (ref 3.4–10.8)

## 2024-06-06 PROCEDURE — 97166 OT EVAL MOD COMPLEX 45 MIN: CPT

## 2024-06-06 PROCEDURE — 82948 REAGENT STRIP/BLOOD GLUCOSE: CPT

## 2024-06-06 PROCEDURE — 80048 BASIC METABOLIC PNL TOTAL CA: CPT | Performed by: STUDENT IN AN ORGANIZED HEALTH CARE EDUCATION/TRAINING PROGRAM

## 2024-06-06 PROCEDURE — 97162 PT EVAL MOD COMPLEX 30 MIN: CPT

## 2024-06-06 PROCEDURE — 97535 SELF CARE MNGMENT TRAINING: CPT

## 2024-06-06 PROCEDURE — 97530 THERAPEUTIC ACTIVITIES: CPT

## 2024-06-06 PROCEDURE — 85027 COMPLETE CBC AUTOMATED: CPT | Performed by: STUDENT IN AN ORGANIZED HEALTH CARE EDUCATION/TRAINING PROGRAM

## 2024-06-06 RX ADMIN — APIXABAN 10 MG: 5 TABLET, FILM COATED ORAL at 20:56

## 2024-06-06 RX ADMIN — GABAPENTIN 200 MG: 100 CAPSULE ORAL at 08:50

## 2024-06-06 RX ADMIN — GABAPENTIN 200 MG: 100 CAPSULE ORAL at 20:57

## 2024-06-06 RX ADMIN — ASPIRIN 81 MG: 81 TABLET, COATED ORAL at 08:51

## 2024-06-06 RX ADMIN — ATORVASTATIN CALCIUM 10 MG: 20 TABLET, FILM COATED ORAL at 08:51

## 2024-06-06 RX ADMIN — APIXABAN 10 MG: 5 TABLET, FILM COATED ORAL at 08:50

## 2024-06-06 NOTE — PLAN OF CARE
Goal Outcome Evaluation:  Plan of Care Reviewed With: patient, spouse           Outcome Evaluation: Pt is a 79 y/o F admitted to [facility] with L LE swelling revealing a DVT. Pt was on therapeutic lovenox but transitioned to Eliquis since admission. RN cleared pt for OOB activity. Pt reports she lives with her spouse and son who assist with all mobility and ADL's. Pt utilizes a rollator for mobility. Pt presents to PT with generalized weakness, decreased endurance, and impaired functional mobility. Pt requires min A and increased time to reach sitting EOB. Pt stood with a posterior LOB initially but able to correct with assist and ambulated 6' forward/backward c 4WW requiring min/mod A x 2. Pt demo's a very slow shuffling gait with cues provided for longer stride length. Distance limited by weakness and fatigue. PT recommends SNF at D/C to address functional deficits.      Anticipated Discharge Disposition (PT): skilled nursing facility

## 2024-06-06 NOTE — THERAPY TREATMENT NOTE
Patient Name: Kirti Santillan  : 1945    MRN: [id number]                              Today's Date: 2024       Admit Date: 2024    Visit Dx:     ICD-10-CM ICD-9-CM   1. Acute deep vein thrombosis (DVT) of femoral vein of left lower extremity  I82.412 453.41     Patient Active Problem List   Diagnosis    Multiple falls    Abdominal aneurysm    Chronic back pain    Lumbar radiculopathy    Lumbar spinal stenosis    Primary hypertension    Prediabetes    Lumbar stenosis with neurogenic claudication    Displacement of lumbar intervertebral disc without myelopathy    Abnormal nuclear stress test    DVT (deep venous thrombosis)     Past Medical History:   Diagnosis Date    High blood pressure 11/3/2023    Low back pain     Lumbar radiculopathy 2023    Sciatica of left side      Past Surgical History:   Procedure Laterality Date    EPIDURAL BLOCK      EYE SURGERY      LUMBAR EPIDURAL INJECTION N/A 2024    Procedure: LUMBAR EPIDURAL 1ST VISIT L4-5;  Surgeon: Nikole Baig MD;  Location: [facility] MAIN OR;  Service: Pain Management;  Laterality: N/A;      General Information       Row Name 24 1102          Physical Therapy Time and Intention    Document Type evaluation  -CS     Mode of Treatment co-treatment;physical therapy;occupational therapy  -CS       Row Name 24 1102          General Information    Patient Profile Reviewed yes  -CS     Prior Level of Function gait;transfer;bed mobility;min assist:  spouse assist with all mobility and ADL's  -CS     Existing Precautions/Restrictions fall  -CS     Barriers to Rehab medically complex;previous functional deficit  -CS       Row Name 24 1102          Living Environment    People in Home child(marco a), adult;spouse  -CS       Row Name 24 1102          Home Main Entrance    Number of Stairs, Main Entrance none;other (see comments)  ramp  -CS       Row Name 24 1102          Stairs Within Home, Primary    Number of Stairs,  Within Home, Primary none  -CS       Row Name 06/06/24 1102          Cognition    Orientation Status (Cognition) oriented x 3  -CS       Row Name 06/06/24 1102          Safety Issues, Functional Mobility    Impairments Affecting Function (Mobility) balance;endurance/activity tolerance;coordination;postural/trunk control;range of motion (ROM);strength  -CS     Comment, Safety Issues/Impairments (Mobility) Co treatment medically appropriate and necessary due to patient acuity level, activity tolerance and safety of patient and staff. Evaluation established to achieve all goals in POC.  -CS               User Key  (r) = Recorded By, (t) = Taken By, (c) = Cosigned By      Initials Name Provider Type    CS Viridiana Horta, PT Physical Therapist                   Mobility       Row Name 06/06/24 1103          Bed Mobility    Bed Mobility supine-sit;sit-supine  -CS     Supine-Sit Yates (Bed Mobility) minimum assist (75% patient effort);verbal cues  -CS     Sit-Supine Yates (Bed Mobility) moderate assist (50% patient effort);verbal cues  -CS     Assistive Device (Bed Mobility) bed rails;head of bed elevated  -CS     Comment, (Bed Mobility) cues for sequencing + increased time to complete  -CS       Row Name 06/06/24 1103          Sit-Stand Transfer    Sit-Stand Yates (Transfers) minimum assist (75% patient effort);moderate assist (50% patient effort);2 person assist;verbal cues;nonverbal cues (demo/gesture)  -CS     Assistive Device (Sit-Stand Transfers) walker, 4-wheeled  -CS     Comment, (Sit-Stand Transfer) posterior LOB when initially standing  -CS       Row Name 06/06/24 1103          Gait/Stairs (Locomotion)    [place]ates Level (Gait) minimum assist (75% patient effort);2 person assist;verbal cues;nonverbal cues (demo/gesture)  -CS     Assistive Device (Gait) walker, 4-wheeled  -CS     Distance in Feet (Gait) 6  -CS     Deviations/Abnormal Patterns (Gait) davon decreased;gait speed  decreased;stride length decreased;festinating/shuffling;weight shifting decreased  -CS     Bilateral Gait Deviations forward flexed posture;heel strike decreased  -CS     [place] Level (Stairs) not tested  -CS     Comment, (Gait/Stairs) very slow shuffling gait with difficulty clearing B LE - cues for longer stride length  -CS               User Key  (r) = Recorded By, (t) = Taken By, (c) = Cosigned By      Initials Name Provider Type    CS Viridiana Horta, PT Physical Therapist                   Obj/Interventions       Row Name 06/06/24 1104          Range of Motion Comprehensive    General Range of Motion other (see comments)  -CS     Comment, General Range of Motion B LE grossly limited 25%  -CS       Row Name 06/06/24 1104          Strength Comprehensive (MMT)    General Manual Muscle Testing (MMT) Assessment other (see comments)  -CS     Comment, General Manual Muscle Testing (MMT) Assessment B LE grossly 2-/5  -CS       Row Name 06/06/24 1104          Balance    Balance Assessment sitting static balance;sitting dynamic balance;standing static balance;standing dynamic balance  -CS     Static Sitting Balance standby assist  -CS     Dynamic Sitting Balance standby assist;contact guard  -CS     Position, Sitting Balance unsupported;sitting edge of bed  -CS     Static Standing Balance minimal assist  -CS     Dynamic Standing Balance minimal assist;2-person assist  -CS     Position/Device Used, Standing Balance supported;walker, 4-wheeled  -CS               User Key  (r) = Recorded By, (t) = Taken By, (c) = Cosigned By      Initials Name Provider Type    CS Viridiana Horta, PT Physical Therapist                   Goals/Plan       Row Name 06/06/24 1109          Bed Mobility Goal 1 (PT)    Activity/Assistive Device (Bed Mobility Goal 1, PT) bed mobility activities, all  -CS     [place] Level/Cues Needed (Bed Mobility Goal 1, PT) minimum assist (75% or more patient effort)  -     Time Frame (Bed Mobility  Goal 1, PT) 2 weeks  -CS       Row Name 06/06/24 1109          Transfer Goal 1 (PT)    Activity/Assistive Device (Transfer Goal 1, PT) sit-to-stand/stand-to-sit;bed-to-chair/chair-to-bed  -CS     [place] Level/Cues Needed (Transfer Goal 1, PT) minimum assist (75% or more patient effort)  -CS     Time Frame (Transfer Goal 1, PT) 2 weeks  -CS       Row Name 06/06/24 1109          Gait Training Goal 1 (PT)    Activity/Assistive Device (Gait Training Goal 1, PT) gait (walking locomotion);assistive device use;decrease fall risk;improve balance and speed;increase endurance/gait distance  -CS     [place] Level (Gait Training Goal 1, PT) minimum assist (75% or more patient effort)  -CS     Distance (Gait Training Goal 1, PT) 40'  -CS     Time Frame (Gait Training Goal 1, PT) 2 weeks  -CS               User Key  (r) = Recorded By, (t) = Taken By, (c) = Cosigned By      Initials Name Provider Type    CS Viridiana Horta, PT Physical Therapist                   Clinical Impression       Row Name 06/06/24 1104          Pain    Pretreatment Pain Rating 4/10  -CS     Posttreatment Pain Rating 4/10  -CS     Pain Location lower  -CS     Pain Location - back  -CS     Pain Intervention(s) Ambulation/increased activity;Rest;Repositioned  -CS       Row Name 06/06/24 110          Plan of Care Review    Plan of Care Reviewed With patient;spouse  -CS     Outcome Evaluation Pt is a 79 y/o F admitted to [facility] with L LE swelling revealing a DVT. Pt was on therapeutic lovenox but transitioned to Eliquis since admission. RN cleared pt for OOB activity. Pt reports she lives with her spouse and son who assist with all mobility and ADL's. Pt utilizes a rollator for mobility. Pt presents to PT with generalized weakness, decreased endurance, and impaired functional mobility. Pt requires min A and increased time to reach sitting EOB. Pt stood with a posterior LOB initially but able to correct with assist and ambulated 6' forward/backward  c 4WW requiring min/mod A x 2. Pt demo's a very slow shuffling gait with cues provided for longer stride length. Distance limited by weakness and fatigue. PT recommends SNF at D/C to address functional deficits.  -CS       Row Name 06/06/24 1105          Therapy Assessment/Plan (PT)    Rehab Potential (PT) good, to achieve stated therapy goals  -CS     Criteria for Skilled Interventions Met (PT) yes;meets criteria  -CS     Therapy Frequency (PT) 5 times/wk  -CS       Row Name 06/06/24 1105          Vital Signs    O2 Delivery Pre Treatment room air  -CS     O2 Delivery Intra Treatment room air  -CS     O2 Delivery Post Treatment room air  -CS       Row Name 06/06/24 1105          Positioning and Restraints    Pre-Treatment Position in bed  -CS     Post Treatment Position bed  -CS     In Bed notified nsg;supine;call light within reach;encouraged to call for assist;exit alarm on;with family/caregiver  -CS               User Key  (r) = Recorded By, (t) = Taken By, (c) = Cosigned By      Initials Name Provider Type    CS Viridiana Horta, PT Physical Therapist                   Outcome Measures       Row Name 06/06/24 1109          How much help from another person do you currently need...    Turning from your back to your side while in flat bed without using bedrails? 3  -CS     Moving from lying on back to sitting on the side of a flat bed without bedrails? 3  -CS     Moving to and from a bed to a chair (including a wheelchair)? 2  -CS     Standing up from a chair using your arms (e.g., wheelchair, bedside chair)? 2  -CS     Climbing 3-5 steps with a railing? 2  -CS     To walk in hospital room? 2  -CS     AM-PAC 6 Clicks Score (PT) 14  -CS     Highest Level of Mobility Goal 4 --> Transfer to chair/commode  -CS       Row Name 06/06/24 1109          Functional Assessment    Outcome Measure Options AM-PAC 6 Clicks Basic Mobility (PT)  -CS               User Key  (r) = Recorded By, (t) = Taken By, (c) = Cosigned By       Initials Name Provider Type    CS Viridiana Horta PT Physical Therapist                                 Physical Therapy Education       Title: PT OT SLP Therapies (In Progress)       Topic: Physical Therapy (In Progress)       Point: Mobility training (In Progress)       Learning Progress Summary             Patient Acceptance, E,TB, NR by  at 6/6/2024 1109                         Point: Home exercise program (Not Started)       Learner Progress:  Not documented in this visit.              Point: Body mechanics (In Progress)       Learning Progress Summary             Patient Acceptance, E,TB, NR by  at 6/6/2024 1109                         Point: Precautions (Not Started)       Learner Progress:  Not documented in this visit.                              User Key       Initials Effective Dates Name Provider Type Discipline     09/22/22 -  Viridiana Horta PT Physical Therapist PT                  PT Recommendation and Plan     Plan of Care Reviewed With: patient, spouse  Outcome Evaluation: Pt is a 77 y/o F admitted to [facility] with L LE swelling revealing a DVT. Pt was on therapeutic lovenox but transitioned to Eliquis since admission. RN cleared pt for OOB activity. Pt reports she lives with her spouse and son who assist with all mobility and ADL's. Pt utilizes a rollator for mobility. Pt presents to PT with generalized weakness, decreased endurance, and impaired functional mobility. Pt requires min A and increased time to reach sitting EOB. Pt stood with a posterior LOB initially but able to correct with assist and ambulated 6' forward/backward c 4WW requiring min/mod A x 2. Pt demo's a very slow shuffling gait with cues provided for longer stride length. Distance limited by weakness and fatigue. PT recommends SNF at D/C to address functional deficits.     Time Calculation:         PT Charges       Row Name 06/06/24 1110             Time Calculation    Start Time 0810  -      Stop Time 0825  -       Time Calculation (min) 15 min  -CS      PT Received On 06/06/24  -CS      PT - Next Appointment 06/07/24  -CS      PT Goal Re-Cert Due Date 06/20/24  -CS         Time Calculation- PT    Total Timed Code Minutes- PT 12 minute(s)  -CS         Timed Charges    21188 - PT Therapeutic Activity Minutes 12  -CS         Total Minutes    Timed Charges Total Minutes 12  -CS       Total Minutes 12  -CS                User Key  (r) = Recorded By, (t) = Taken By, (c) = Cosigned By      Initials Name Provider Type    CS Viridiana Horta, PT Physical Therapist                  Therapy Charges for Today       Code Description Service Date Service Provider Modifiers Qty    87885916351 HC PT THERAPEUTIC ACT EA 15 MIN 6/6/2024 Viridiana Horta, PT GP 1    03244870667 HC PT EVAL MOD COMPLEXITY 3 6/6/2024 Viridiana Horta, PT GP 1            PT G-Codes  Outcome Measure Options: AM-PAC 6 Clicks Basic Mobility (PT)  AM-PAC 6 Clicks Score (PT): 14  PT Discharge Summary  Anticipated Discharge Disposition (PT): skilled nursing facility    Viridiana Horta PT  6/6/2024

## 2024-06-06 NOTE — CASE MANAGEMENT/SOCIAL WORK
Continued Stay Note  [facility]     Patient Name: Kirti Santillan  MRN: [id number]  Today's Date: 6/6/2024    Admit Date: 6/4/2024    Plan: SNF referrals pending   Discharge Plan       Row Name 06/06/24 1513       Plan    Plan SNF referrals pending    Patient/Family in Agreement with Plan yes    Plan Comments CCP received call from pt regarding SNF referrals. Pt stated she would like referrals made to 1. [facility] 2. [facility] 3. [facility]. CCP explained referrals would be made and we would notify pt on who can accept and bed availability. LVM for [facility] notifying of referral. Osmin/Signature notified of referral. Partial packet in CCP office. Yosvany RN/CCP      Row Name 06/06/24 1443       Plan    Plan SNF, pending pt and spouse decision on referrals    Patient/Family in Agreement with Plan yes    Plan Comments CCP met with pt and spouse at the bedside to discuss DC. CCP explained PT recs for SNF. Pt and spouse are agreeable. CCP discussed making referrals to facilities of their choice. CCP left SNF list and [facility] at the bedside. Pt and spouse to notify CCP of facilities they would like a referral. Yosvany RN/CCP                   Discharge Codes    No documentation.                 Expected Discharge Date and Time       Expected Discharge Date Expected Discharge Time    Jun 8, 2024               Jacqui Calderon, CORTES

## 2024-06-06 NOTE — PLAN OF CARE
Problem: Adult Inpatient Plan of Care  Goal: Plan of Care Review  Outcome: Ongoing, Progressing  Flowsheets (Taken 6/6/2024 0308)  Plan of Care Reviewed With: patient  Goal: Patient-Specific Goal (Individualized)  Outcome: Ongoing, Progressing  Goal: Absence of Hospital-Acquired Illness or Injury  Outcome: Ongoing, Progressing  Intervention: Identify and Manage Fall Risk  Recent Flowsheet Documentation  Taken 6/6/2024 0200 by Eli Nelson RN  Safety Promotion/Fall Prevention:   activity supervised   assistive device/personal items within reach   clutter free environment maintained   safety round/check completed  Taken 6/6/2024 0000 by Eli Nelson RN  Safety Promotion/Fall Prevention:   activity supervised   assistive device/personal items within reach   clutter free environment maintained   safety round/check completed  Taken 6/5/2024 2200 by Eli Nelson RN  Safety Promotion/Fall Prevention:   activity supervised   assistive device/personal items within reach   clutter free environment maintained   safety round/check completed  Taken 6/5/2024 2000 by Eli Nelson RN  Safety Promotion/Fall Prevention: safety round/check completed  Intervention: Prevent Skin Injury  Recent Flowsheet Documentation  Taken 6/6/2024 0200 by Eli Nelson RN  Body Position: position changed independently  Taken 6/6/2024 0000 by Eli Nelson RN  Body Position: position changed independently  Taken 6/5/2024 2200 by Eli Nelson RN  Body Position: position changed independently  Taken 6/5/2024 2000 by Eli Nelson RN  Body Position: position changed independently  Skin Protection: adhesive use limited  Intervention: Prevent and Manage VTE (Venous Thromboembolism) Risk  Recent Flowsheet Documentation  Taken 6/5/2024 2000 by Eli Nelson RN  VTE Prevention/Management: (lovanox) other (see comments)  Range of Motion: active ROM (range of motion) encouraged  Intervention: Prevent Infection  Recent Flowsheet  Documentation  Taken 6/5/2024 2000 by Eli Nelson RN  Infection Prevention: single patient room provided  Goal: Optimal Comfort and Wellbeing  Outcome: Ongoing, Progressing  Intervention: Provide Person-Centered Care  Recent Flowsheet Documentation  Taken 6/5/2024 2000 by Eli Nelson RN  Trust Relationship/Rapport:   care explained   choices provided  Goal: Readiness for Transition of Care  Outcome: Ongoing, Progressing     Problem: Fall Injury Risk  Goal: Absence of Fall and Fall-Related Injury  Outcome: Ongoing, Progressing  Intervention: Identify and Manage Contributors  Recent Flowsheet Documentation  Taken 6/6/2024 0200 by Eli Nelson RN  Medication Review/Management: medications reviewed  Taken 6/6/2024 0000 by Eli Nelson RN  Medication Review/Management: medications reviewed  Taken 6/5/2024 2200 by Eli Nelson RN  Medication Review/Management: medications reviewed  Taken 6/5/2024 2000 by Eli Nelson RN  Medication Review/Management: medications reviewed  Intervention: Promote Injury-Free Environment  Recent Flowsheet Documentation  Taken 6/6/2024 0200 by Eli Nelson RN  Safety Promotion/Fall Prevention:   activity supervised   assistive device/personal items within reach   clutter free environment maintained   safety round/check completed  Taken 6/6/2024 0000 by Eli Nelson RN  Safety Promotion/Fall Prevention:   activity supervised   assistive device/personal items within reach   clutter free environment maintained   safety round/check completed  Taken 6/5/2024 2200 by Eli Nelson RN  Safety Promotion/Fall Prevention:   activity supervised   assistive device/personal items within reach   clutter free environment maintained   safety round/check completed  Taken 6/5/2024 2000 by Eli Nelson RN  Safety Promotion/Fall Prevention: safety round/check completed     Problem: VTE (Venous Thromboembolism)  Goal: VTE (Venous Thromboembolism) Symptom Resolution  Outcome: Ongoing,  Progressing  Intervention: Prevent or Manage VTE (Venous Thromboembolism)  Recent Flowsheet Documentation  Taken 6/5/2024 2000 by Eli Nelson RN  VTE Prevention/Management: (lovanox) other (see comments)   Goal Outcome Evaluation:  Plan of Care Reviewed With: patient

## 2024-06-06 NOTE — PLAN OF CARE
Goal Outcome Evaluation:  Plan of Care Reviewed With: patient, spouse        Progress: no change  Outcome Evaluation: 7 y/o F admitted to [facility] with L LE swelling revealing a DVT. Pt was on therapeutic lovenox but transitioned to Eliquis since admission. RN cleared pt for OOB activity. Prior to admission pt reports living with spouse, requiring assistance with dressing, bathing and toileting. Pt. reports use of a rollator and assist from spouse for short distance mobility in the home. Currently pt. presents with deficits in balance, strength, and act ese impairing her independence with ADLS. Pt. required min A for bed mob, min-mod A x2 for STS and func mob. OT services will be required to address the above mentioned deficits and rec DC to SNF      Anticipated Discharge Disposition (OT): skilled nursing facility

## 2024-06-06 NOTE — THERAPY EVALUATION
Patient Name: Kirti Santillan  : 1945    MRN: [id number]                              Today's Date: 2024       Admit Date: 2024    Visit Dx:     ICD-10-CM ICD-9-CM   1. Acute deep vein thrombosis (DVT) of femoral vein of left lower extremity  I82.412 453.41     Patient Active Problem List   Diagnosis    Multiple falls    Abdominal aneurysm    Chronic back pain    Lumbar radiculopathy    Lumbar spinal stenosis    Primary hypertension    Prediabetes    Lumbar stenosis with neurogenic claudication    Displacement of lumbar intervertebral disc without myelopathy    Abnormal nuclear stress test    DVT (deep venous thrombosis)     Past Medical History:   Diagnosis Date    High blood pressure 11/3/2023    Low back pain     Lumbar radiculopathy 2023    Sciatica of left side      Past Surgical History:   Procedure Laterality Date    EPIDURAL BLOCK      EYE SURGERY      LUMBAR EPIDURAL INJECTION N/A 2024    Procedure: LUMBAR EPIDURAL 1ST VISIT L4-5;  Surgeon: Nikole Baig MD;  Location: [facility] MAIN OR;  Service: Pain Management;  Laterality: N/A;      General Information       Row Name 24 1141          OT Time and Intention    Document Type evaluation  -AG     Mode of Treatment co-treatment;physical therapy;occupational therapy  cotx for safe pt handling and mobility progression  -AG       Row Name 24 1141          General Information    Patient Profile Reviewed yes  -AG     Prior Level of Function min assist:;ADL's  spouse assist for ADLS  -AG     Existing Precautions/Restrictions fall  -AG     Barriers to Rehab medically complex;previous functional deficit  -AG       Row Name 24 1141          Living Environment    People in Home child(marco a), adult;spouse  -AG       Row Name 24 1141          Home Main Entrance    Number of Stairs, Main Entrance none  Ramp  -AG       Row Name 24 1141          Stairs Within Home, Primary    Number of Stairs, Within Home, Primary none   -AG       Row Name 06/06/24 1141          Cognition    Orientation Status (Cognition) oriented x 3  -AG       Row Name 06/06/24 1141          Safety Issues, Functional Mobility    Safety Issues Affecting Function (Mobility) safety precaution awareness;positioning of assistive device;judgment  -AG     Impairments Affecting Function (Mobility) balance;endurance/activity tolerance;coordination;postural/trunk control;strength  -AG               User Key  (r) = Recorded By, (t) = Taken By, (c) = Cosigned By      Initials Name Provider Type    AG Erik Joya OT Occupational Therapist                     Mobility/ADL's       Row Name 06/06/24 1142          Bed Mobility    Bed Mobility supine-sit;sit-supine  -AG     Supine-Sit [place] (Bed Mobility) minimum assist (75% patient effort);verbal cues  -AG     Sit-Supine [place] (Bed Mobility) moderate assist (50% patient effort);verbal cues  -     Assistive Device (Bed Mobility) bed rails;head of bed elevated  -AG     Comment, (Bed Mobility) excess time and cues for sequencing  -AG       Row Name 06/06/24 1142          Sit-Stand Transfer    Sit-Stand [place] (Transfers) minimum assist (75% patient effort);moderate assist (50% patient effort);2 person assist;verbal cues;nonverbal cues (demo/gesture)  -     Assistive Device (Sit-Stand Transfers) walker, 4-wheeled  -AG     Comment, (Sit-Stand Transfer) posterior lean in standing, improved with cues  -AG       Row Name 06/06/24 1142          Functional Mobility    Functional Mobility- Comment short distance in room w rollator  -AG       Row Name 06/06/24 1142          Activities of Daily Living    BADL Assessment/Intervention upper body dressing;lower body dressing;grooming;toileting  -AG       Row Name 06/06/24 1142          Upper Body Dressing Assessment/Training    [place] Level (Upper Body Dressing) upper body dressing skills;don;front opening garment;minimum assist (75% patient effort)  -      Position (Upper Body Dressing) edge of bed sitting  -AG       [facility] Name 06/06/24 1142          Lower Body Dressing Assessment/Training    [place] Level (Lower Body Dressing) lower body dressing skills;don;socks;dependent (less than 25% patient effort)  -AG     Position (Lower Body Dressing) edge of bed sitting  -AG       [facility] Name 06/06/24 1142          Grooming Assessment/Training    [place] Level (Grooming) grooming skills;hair care, combing/brushing;oral care regimen;wash face, hands;set up  -AG     Position (Grooming) sitting up in bed  -AG       Row Name 06/06/24 1142          Self-Feeding Assessment/Training    [place] Level (Feeding) feeding skills;finger foods;liquids to mouth;independent  -AG       [facility] Name 06/06/24 1142          Toileting Assessment/Training    [place] Level (Toileting) dependent (less than 25% patient effort)  -AG     Comment, (Toileting) purewick suction  -AG               User Key  (r) = Recorded By, (t) = Taken By, (c) = Cosigned By      Initials Name Provider Type    AG Erik Joya OT Occupational Therapist                   Obj/Interventions       [facility] Name 06/06/24 1144          Sensory Assessment (Somatosensory)    Sensory Assessment (Somatosensory) sensation intact  -AG       Row Name 06/06/24 1144          Vision Assessment/Intervention    Visual Impairment/Limitations WFL  -[facility] Name 06/06/24 1144          Range of Motion Comprehensive    General Range of Motion no range of motion deficits identified  -AG       Row Name 06/06/24 1144          Strength Comprehensive (MMT)    Comment, General Manual Muscle Testing (MMT) Assessment generalized weakness  -AG       Row Name 06/06/24 1144          Motor Skills    Motor Skills functional endurance  -AG     Functional Endurance poor  -AG       Row Name 06/06/24 1144          Balance    Balance Assessment sitting static balance;sitting dynamic balance;sit to stand dynamic balance;standing dynamic  balance;standing static balance  -AG     Static Sitting Balance standby assist  -AG     Dynamic Sitting Balance standby assist;contact guard  -AG     Position, Sitting Balance unsupported;sitting edge of bed  -AG     Static Standing Balance minimal assist  -AG     Dynamic Standing Balance minimal assist;2-person assist  -AG     Position/Device Used, Standing Balance supported;walker, 4-wheeled  -AG     Balance Interventions sitting;standing;occupation based/functional task  -AG     Comment, Balance posterior lean in standing  -AG               User Key  (r) = Recorded By, (t) = Taken By, (c) = Cosigned By      Initials Name Provider Type    AG Erik Joya OT Occupational Therapist                   Goals/Plan       Row Name 06/06/24 1149          Bed Mobility Goal 1 (OT)    Activity/Assistive Device (Bed Mobility Goal 1, OT) bed mobility activities, all  -AG     [place] Level/Cues Needed (Bed Mobility Goal 1, OT) modified independence  -AG     Time Frame (Bed Mobility Goal 1, OT) short term goal (STG);2 weeks  -AG     Progress/Outcomes (Bed Mobility Goal 1, OT) goal ongoing  -[facility] Name 06/06/24 1149          Transfer Goal 1 (OT)    Activity/Assistive Device (Transfer Goal 1, OT) transfers, all  -AG     [place] Level/Cues Needed (Transfer Goal 1, OT) modified independence  -AG     Time Frame (Transfer Goal 1, OT) short term goal (STG);2 weeks  -AG     Progress/Outcome (Transfer Goal 1, OT) goal ongoing  -[facility] Name 06/06/24 1149          Dressing Goal 1 (OT)    Activity/Device (Dressing Goal 1, OT) dressing skills, all  -AG     [place]/Cues Needed (Dressing Goal 1, OT) minimum assist (75% or more patient effort)  -AG     Time Frame (Dressing Goal 1, OT) short term goal (STG);2 weeks  -AG     Progress/Outcome (Dressing Goal 1, OT) goal ongoing  -       Row Name 06/06/24 1149          Toileting Goal 1 (OT)    Activity/Device (Toileting Goal 1, OT) toileting skills, all  -AG      [place] Level/Cues Needed (Toileting Goal 1, OT) minimum assist (75% or more patient effort)  -AG     Time Frame (Toileting Goal 1, OT) short term goal (STG);2 weeks  -AG     Progress/Outcome (Toileting Goal 1, OT) goal ongoing  -AG       Row Name 06/06/24 1149          Grooming Goal 1 (OT)    Activity/Device (Grooming Goal 1, OT) grooming skills, all  -AG     [place] (Grooming Goal 1, OT) modified independence  -AG     Time Frame (Grooming Goal 1, OT) short term goal (STG);2 weeks  -AG     Progress/Outcome (Grooming Goal 1, OT) goal ongoing  -AG       Row Name 06/06/24 1149          Therapy Assessment/Plan (OT)    Planned Therapy Interventions (OT) activity tolerance training;functional balance retraining;occupation/activity based interventions;ROM/therapeutic exercise;IADL retraining;adaptive equipment training;BADL retraining;neuromuscular control/coordination retraining;patient/caregiver education/training;transfer/mobility retraining;strengthening exercise  -AG               User Key  (r) = Recorded By, (t) = Taken By, (c) = Cosigned By      Initials Name Provider Type    AG Erik Joya OT Occupational Therapist                   Clinical Impression       Row Name 06/06/24 1146          Pain Assessment    Pretreatment Pain Rating 4/10  -AG     Posttreatment Pain Rating 4/10  -AG     Pain Location lower  -AG     Pain Location - back  -AG     Pain Intervention(s) Repositioned;Rest  -AG       Row Name 06/06/24 1146          Plan of Care Review    Plan of Care Reviewed With patient;spouse  -AG     Progress no change  -AG     Outcome Evaluation 7 y/o F admitted to [facility] with L LE swelling revealing a DVT. Pt was on therapeutic lovenox but transitioned to Eliquis since admission. RN cleared pt for OOB activity. Prior to admission pt reports living with spouse, requiring assistance with dressing, bathing and toileting. Pt. reports use of a rollator and assist from spouse for short distance mobility in  the home. Currently pt. presents with deficits in balance, strength, and act ese impairing her independence with ADLS. Pt. required min A for bed mob, min-mod A x2 for STS and func mob. OT services will be required to address the above mentioned deficits and rec DC to SNF  -AG       Row Name 06/06/24 1146          Therapy Assessment/Plan (OT)    Rehab Potential (OT) fair, will monitor progress closely  -AG     Criteria for Skilled Therapeutic Interventions Met (OT) yes;meets criteria;skilled treatment is necessary  -AG     Therapy Frequency (OT) 5 times/wk  -AG       Row Name 06/06/24 1146          Therapy Plan Review/Discharge Plan (OT)    Anticipated Discharge Disposition (OT) skilled nursing facility  -AG       Row Name 06/06/24 1146          Vital Signs    O2 Delivery Pre Treatment room air  -AG       Row Name 06/06/24 1146          Positioning and Restraints    Pre-Treatment Position in bed  -AG     Post Treatment Position bed  -AG     In Bed notified nsg;encouraged to call for assist;exit alarm on;with family/caregiver;call light within reach;fowlers  -               User Key  (r) = Recorded By, (t) = Taken By, (c) = Cosigned By      Initials Name Provider Type    AG Erik Joya, OT Occupational Therapist                   Outcome Measures       Row Name 06/06/24 1150          How much help from another is currently needed...    Putting on and taking off regular lower body clothing? 2  -AG     Bathing (including washing, rinsing, and drying) 2  -AG     Toileting (which includes using toilet bed pan or urinal) 1  -AG     Putting on and taking off regular upper body clothing 3  -AG     Taking care of personal grooming (such as brushing teeth) 3  -AG     Eating meals 4  -AG     AM-PAC 6 Clicks Score (OT) 15  -AG       Row Name 06/06/24 1109          How much help from another person do you currently need...    Turning from your back to your side while in flat bed without using bedrails? 3  -CS     Moving  from lying on back to sitting on the side of a flat bed without bedrails? 3  -CS     Moving to and from a bed to a chair (including a wheelchair)? 2  -CS     Standing up from a chair using your arms (e.g., wheelchair, bedside chair)? 2  -CS     Climbing 3-5 steps with a railing? 2  -CS     To walk in hospital room? 2  -CS     AM-PAC 6 Clicks Score (PT) 14  -CS     Highest Level of Mobility Goal 4 --> Transfer to chair/commode  -CS       Row Name 06/06/24 1150 06/06/24 1109       Functional Assessment    Outcome Measure Options AM-PAC 6 Clicks Daily Activity (OT)  -AG AM-PAC 6 Clicks Basic Mobility (PT)  -CS              User Key  (r) = Recorded By, (t) = Taken By, (c) = Cosigned By      Initials Name Provider Type    CS Viridiana Horta, PT Physical Therapist    Erik Joseph, OT Occupational Therapist                    Occupational Therapy Education       Title: PT OT SLP Therapies (In Progress)       Topic: Occupational Therapy (Done)       Point: ADL training (Done)       Description:   Instruct learner(s) on proper safety adaptation and remediation techniques during self care or transfers.   Instruct in proper use of assistive devices.                  Learning Progress Summary             Patient Acceptance, E,TB, VU by  at 6/6/2024 1150                         Point: Home exercise program (Done)       Description:   Instruct learner(s) on appropriate technique for monitoring, assisting and/or progressing therapeutic exercises/activities.                  Learning Progress Summary             Patient Acceptance, E,TB, VU by  at 6/6/2024 1150                         Point: Precautions (Done)       Description:   Instruct learner(s) on prescribed precautions during self-care and functional transfers.                  Learning Progress Summary             Patient Acceptance, E,TB, VU by  at 6/6/2024 1150                         Point: Body mechanics (Done)       Description:   Instruct learner(s) on  proper positioning and spine alignment during self-care, functional mobility activities and/or exercises.                  Learning Progress Summary             Patient Acceptance, E,TB, VU by  at 6/6/2024 1150                                         User Key       Initials Effective Dates Name Provider Type Discipline     01/23/24 -  Erik Joya, CATHERINE Occupational Therapist OT                  OT Recommendation and Plan  Planned Therapy Interventions (OT): activity tolerance training, functional balance retraining, occupation/activity based interventions, ROM/therapeutic exercise, IADL retraining, adaptive equipment training, BADL retraining, neuromuscular control/coordination retraining, patient/caregiver education/training, transfer/mobility retraining, strengthening exercise  Therapy Frequency (OT): 5 times/wk  Plan of Care Review  Plan of Care Reviewed With: patient, spouse  Progress: no change  Outcome Evaluation: 7 y/o F admitted to [facility] with L LE swelling revealing a DVT. Pt was on therapeutic lovenox but transitioned to Eliquis since admission. RN cleared pt for OOB activity. Prior to admission pt reports living with spouse, requiring assistance with dressing, bathing and toileting. Pt. reports use of a rollator and assist from spouse for short distance mobility in the home. Currently pt. presents with deficits in balance, strength, and act ese impairing her independence with ADLS. Pt. required min A for bed mob, min-mod A x2 for STS and func mob. OT services will be required to address the above mentioned deficits and rec DC to SNF     Time Calculation:   Evaluation Complexity (OT)  Review Occupational Profile/Medical/Therapy History Complexity: expanded/moderate complexity  Assessment, Occupational Performance/Identification of Deficit Complexity: 3-5 performance deficits  Clinical Decision Making Complexity (OT): detailed assessment/moderate complexity  Overall Complexity of Evaluation (OT):  moderate complexity     Time Calculation- OT       Row Name 06/06/24 1151             Time Calculation- OT    OT Start Time 0815  -AG      OT Stop Time 0840  -AG      OT Time Calculation (min) 25 min  -AG      Total Timed Code Minutes- OT 20 minute(s)  -AG      OT Received On 06/06/24  -AG      OT - Next Appointment 06/07/24  -AG      OT Goal Re-Cert Due Date 06/20/24  -AG         Timed Charges    26852 - OT Self Care/Mgmt Minutes 20  -AG         Untimed Charges    OT Eval/Re-eval Minutes 5  -AG         Total Minutes    Timed Charges Total Minutes 20  -AG      Untimed Charges Total Minutes 5  -AG       Total Minutes 25  -AG                User Key  (r) = Recorded By, (t) = Taken By, (c) = Cosigned By      Initials Name Provider Type    AG Erik Joya OT Occupational Therapist                  Therapy Charges for Today       Code Description Service Date Service Provider Modifiers Qty    37910510201 HC OT SELF CARE/MGMT/TRAIN EA 15 MIN 6/6/2024 Erik Joya OT GO 1    88837746169 HC OT EVAL MOD COMPLEXITY 3 6/6/2024 Erik Joya OT GO 1                 Erik Joya OT  6/6/2024

## 2024-06-06 NOTE — PROGRESS NOTES
Name: Kirti Santillan ADMIT: 2024   : 1945  PCP: Deangelo Sprague MD    MRN: [id number] LOS: 1 days   AGE/SEX: 78 y.o. female  ROOM: [facility]     Subjective   Subjective     No events overnight. No complaints. PT is recommending skilled rehab. She's very weak.        Objective   Objective   Vital Signs  Temp:  [98.1 °F (36.7 °C)-98.4 °F (36.9 °C)] 98.1 °F (36.7 °C)  Heart Rate:  [66-81] 75  Resp:  [16-18] 16  BP: (122-151)/(72-81) 146/72  SpO2:  [95 %-100 %] 100 %  on   ;   Device (Oxygen Therapy): room air  Body mass index is 30.27 kg/m².  Physical Exam  Constitutional:       General: She is not in acute distress.     Appearance: She is not toxic-appearing.   Cardiovascular:      Rate and Rhythm: Normal rate and regular rhythm.      Heart sounds: Normal heart sounds.   Pulmonary:      Effort: Pulmonary effort is normal.      Breath sounds: Normal breath sounds.   Abdominal:      General: Bowel sounds are normal.      Palpations: Abdomen is soft.   Musculoskeletal:         General: No tenderness.      Right lower leg: No edema.      Left lower leg: Edema present.   Neurological:      Mental Status: She is alert.   Psychiatric:         Mood and Affect: Mood normal.         Behavior: Behavior normal.         Results Review     I reviewed the patient's new clinical results.  Results from last 7 days   Lab Units 24  1337   WBC 10*3/mm3 7.73 7.18 9.77 8.02   HEMOGLOBIN g/dL 11.7* 11.5* 13.4 13.3   PLATELETS 10*3/mm3 241 221 259 277     Results from last 7 days   Lab Units 24  04224  1337   SODIUM mmol/L 135* 137 136 139   POTASSIUM mmol/L 4.2 3.7 4.0 4.0   CHLORIDE mmol/L 101 103 101 98   CO2 mmol/L 27.1 27.0 26.0 26.2   BUN mg/dL 10 10 13 12   CREATININE mg/dL 0.87 0.57 0.77 0.83   GLUCOSE mg/dL 116* 118* 146* 218*   Estimated Creatinine Clearance: 51.7 mL/min (by C-G formula based on SCr of 0.87 mg/dL).  Results  "from last 7 days   Lab Units 06/04/24  1708 06/04/24  1337   ALBUMIN g/dL 3.4* 3.7   BILIRUBIN mg/dL 0.4 0.4   ALK PHOS U/L 137* 145*   AST (SGOT) U/L 6 9   ALT (SGPT) U/L 7 6     Results from last 7 days   Lab Units 06/06/24  0421 06/05/24  0342 06/04/24  1708 06/04/24  1337   CALCIUM mg/dL 9.8 9.6 10.3 10.5   ALBUMIN g/dL  --   --  3.4* 3.7     No results found for: \"COVID19\"  Glucose   Date/Time Value Ref Range Status   06/06/2024 0606 123 70 - 130 mg/dL Final       Duplex Venous Lower Extremity - Left CAR    Acute left lower extremity deep vein thrombosis noted in the common   iliac, external iliac, common femoral, deep femoral, proximal femoral, mid   femoral, distal femoral, popliteal and gastrocnemius.  IVC is patent   without evidence of thrombus.  Right common iliac is patent without   evidence of thrombus.    Acute left lower extremity superficial thrombophlebitis noted in the   saphenofemoral junction and great saphenous (above knee).    Incidental finding of 3.7 cm infrarenal abdominal aortic aneurysm.    Scheduled Medications  apixaban, 10 mg, Oral, Q12H   Followed by  [START ON 6/12/2024] apixaban, 5 mg, Oral, Q12H  aspirin, 81 mg, Oral, Daily  atorvastatin, 10 mg, Oral, Daily  gabapentin, 200 mg, Oral, BID    Infusions   Diet  Diet: Cardiac; Healthy Heart (2-3 Na+); Fluid Consistency: Thin (IDDSI 0)       Assessment/Plan     Active Hospital Problems    Diagnosis  POA    **DVT (deep venous thrombosis) [I82.409]  Yes    Abnormal nuclear stress test [R94.39]  Yes    Prediabetes [R73.03]  Yes    Abdominal aneurysm [I71.40]  Yes      Resolved Hospital Problems   No resolved problems to display.       78 y.o. female admitted with DVT (deep venous thrombosis).    Acute left lower extremity DVT-I think this is provoked by immobility. Transitioned successfully to eliquis last night. Will need to be on therapy for 3-6 months.  Abnormal stress test with presume coronary artery disease-asa/statin. No plans for " cardiac catheterization per her last cardiology note  Essential hypertension-no longer on amlodipine and bp is adequately controlled for her age  Hyperlipidemia-statin  Primary hyperparathyroidism   AAA  Lumbar spinal stenosis   Age related physical debility-physical therapy is recommending rehab  eliquis  for DVT prophylaxis.  Full code.  Discussed with patient, nursing staff, and CCP.  Anticipate discharge  TBD  once arrangements have been made.      Darci Williamson MD  [facility]  06/06/24  12:48 EDT    I wore protective equipment throughout this patient encounter including a face mask, gloves and protective eyewear.  Hand hygiene was performed before donning protective equipment and after removal when leaving the room.

## 2024-06-06 NOTE — DISCHARGE PLACEMENT REQUEST
"Kirti Gardiner (78 y.o. Female)       Date of Birth   1945    Social Security Number       Address   [address] [place] [address]    Home Phone   [phone]    MRN   [id number]       Mandaen   Uatsdin    Marital Status                               Admission Date   6/4/24    Admission Type   Emergency    Admitting Provider   Hermelinda Martin MD    Attending Provider   Darci Williamson MD    Department, Room/Bed   [address], S413/1       Discharge Date       Discharge Disposition       Discharge Destination                                 Attending Provider: Darci Williamson MD    Allergies: Hydrocodone    Isolation: None   Infection: None   Code Status: CPR    Ht: 160 cm (63\")   Wt: 77.5 kg (170 lb 13.7 oz)    Admission Cmt: None   Principal Problem: DVT (deep venous thrombosis) [I82.409]                   Active Insurance as of 6/4/2024       Primary Coverage       Payor Plan Insurance Group Employer/Plan Group    MEDICARE MEDICARE A & B        Payor Plan Address Payor Plan Phone Number Payor Plan Fax Number Effective Dates    [address] [phone]  11/1/2009 - None Entered    [facility] [id number]         Subscriber Name Subscriber Birth Date Member ID       KIRTI GARDINER 1945 [id number]               Secondary Coverage       Payor Plan Insurance Group Employer/Plan Group    [employer]AR HE[employer] C[employer] OPTIONS        Payor Plan Address Payor Plan Phone Number Payor Plan Fax Number Effective Dates    [facility] [phone]  1/1/2023 - None Entered    [address]       [facility] [address]         Subscriber Name Subscriber Birth Date Member ID       KIRTI GARDINER 1945 [id number]                     Emergency Contacts        (Rel.) Home Phone Work Phone Mobile Phone    Gardiner,Don (Spouse) -- -- [phone]    Robinson Gardiner (Son) [phone] -- --                "

## 2024-06-06 NOTE — PLAN OF CARE
Problem: Adult Inpatient Plan of Care  Goal: Plan of Care Review  Outcome: Ongoing, Progressing  Flowsheets (Taken 6/6/2024 1806)  Progress: improving  Plan of Care Reviewed With: patient  Goal: Patient-Specific Goal (Individualized)  Outcome: Ongoing, Progressing  Goal: Absence of Hospital-Acquired Illness or Injury  Outcome: Ongoing, Progressing  Intervention: Identify and Manage Fall Risk  Recent Flowsheet Documentation  Taken 6/6/2024 1800 by Osmin Martinez RN  Safety Promotion/Fall Prevention:   assistive device/personal items within reach   activity supervised   clutter free environment maintained   toileting scheduled   safety round/check completed   room organization consistent   nonskid shoes/slippers when out of bed   fall prevention program maintained  Taken 6/6/2024 1400 by Osmin Martinez RN  Safety Promotion/Fall Prevention:   activity supervised   assistive device/personal items within reach   clutter free environment maintained   toileting scheduled   safety round/check completed   room organization consistent   nonskid shoes/slippers when out of bed   fall prevention program maintained  Taken 6/6/2024 1200 by Osmin Martinez RN  Safety Promotion/Fall Prevention:   assistive device/personal items within reach   activity supervised   clutter free environment maintained   toileting scheduled   safety round/check completed   room organization consistent   nonskid shoes/slippers when out of bed   fall prevention program maintained  Taken 6/6/2024 1021 by Osmin Martinez RN  Safety Promotion/Fall Prevention:   activity supervised   assistive device/personal items within reach   clutter free environment maintained   room organization consistent   safety round/check completed   toileting scheduled   nonskid shoes/slippers when out of bed   fall prevention program maintained  Taken 6/6/2024 0812 by Osmin Martinez, RN  Safety Promotion/Fall Prevention:   activity supervised   clutter free environment  maintained   assistive device/personal items within reach   safety round/check completed   room organization consistent   toileting scheduled   nonskid shoes/slippers when out of bed   fall prevention program maintained  Intervention: Prevent Skin Injury  Recent Flowsheet Documentation  Taken 6/6/2024 1800 by Osmin Martinez RN  Body Position: position changed independently  Taken 6/6/2024 1400 by Osmin Martinez RN  Body Position: position changed independently  Taken 6/6/2024 1200 by Osmin Martinez RN  Body Position: position changed independently  Taken 6/6/2024 1021 by Osmin Martinez RN  Body Position: position changed independently  Taken 6/6/2024 0812 by Osmin Martinez RN  Body Position: position changed independently  Intervention: Prevent and Manage VTE (Venous Thromboembolism) Risk  Recent Flowsheet Documentation  Taken 6/6/2024 0812 by Osmin Martinez RN  VTE Prevention/Management: (eliquis) other (see comments)  Intervention: Prevent Infection  Recent Flowsheet Documentation  Taken 6/6/2024 1800 by Osmin Martinez RN  Infection Prevention:   hand hygiene promoted   rest/sleep promoted  Taken 6/6/2024 1400 by Osmin Martinez RN  Infection Prevention:   hand hygiene promoted   rest/sleep promoted  Taken 6/6/2024 1200 by Osmin Martinez RN  Infection Prevention:   hand hygiene promoted   rest/sleep promoted  Taken 6/6/2024 1021 by Osmin Martinez RN  Infection Prevention:   hand hygiene promoted   rest/sleep promoted  Taken 6/6/2024 0812 by Osmin Martinez RN  Infection Prevention:   hand hygiene promoted   rest/sleep promoted  Goal: Optimal Comfort and Wellbeing  Outcome: Ongoing, Progressing  Goal: Readiness for Transition of Care  Outcome: Ongoing, Progressing     Problem: Fall Injury Risk  Goal: Absence of Fall and Fall-Related Injury  Outcome: Ongoing, Progressing  Intervention: Identify and Manage Contributors  Recent Flowsheet Documentation  Taken 6/6/2024 1800 by Osmin Martinez  RN  Medication Review/Management: medications reviewed  Taken 6/6/2024 1400 by Osmin Martinez RN  Medication Review/Management: medications reviewed  Taken 6/6/2024 1200 by Osmin Martinez RN  Medication Review/Management: medications reviewed  Taken 6/6/2024 1021 by Osmin Martinez RN  Medication Review/Management: medications reviewed  Taken 6/6/2024 0812 by Osmin Martinez RN  Medication Review/Management: medications reviewed  Intervention: Promote Injury-Free Environment  Recent Flowsheet Documentation  Taken 6/6/2024 1800 by Osmin Martinez RN  Safety Promotion/Fall Prevention:   assistive device/personal items within reach   activity supervised   clutter free environment maintained   toileting scheduled   safety round/check completed   room organization consistent   nonskid shoes/slippers when out of bed   fall prevention program maintained  Taken 6/6/2024 1400 by Osmin Martinez RN  Safety Promotion/Fall Prevention:   activity supervised   assistive device/personal items within reach   clutter free environment maintained   toileting scheduled   safety round/check completed   room organization consistent   nonskid shoes/slippers when out of bed   fall prevention program maintained  Taken 6/6/2024 1200 by Osmin Martinez RN  Safety Promotion/Fall Prevention:   assistive device/personal items within reach   activity supervised   clutter free environment maintained   toileting scheduled   safety round/check completed   room organization consistent   nonskid shoes/slippers when out of bed   fall prevention program maintained  Taken 6/6/2024 1021 by Osmin Martinez RN  Safety Promotion/Fall Prevention:   activity supervised   assistive device/personal items within reach   clutter free environment maintained   room organization consistent   safety round/check completed   toileting scheduled   nonskid shoes/slippers when out of bed   fall prevention program maintained  Taken 6/6/2024 0812 by Michelle  Osmin, RN  Safety Promotion/Fall Prevention:   activity supervised   clutter free environment maintained   assistive device/personal items within reach   safety round/check completed   room organization consistent   toileting scheduled   nonskid shoes/slippers when out of bed   fall prevention program maintained     Problem: VTE (Venous Thromboembolism)  Goal: VTE (Venous Thromboembolism) Symptom Resolution  Outcome: Ongoing, Progressing  Intervention: Prevent or Manage VTE (Venous Thromboembolism)  Recent Flowsheet Documentation  Taken 6/6/2024 0812 by Osmin Martinez, RN  VTE Prevention/Management: (eliquis) other (see comments)   Goal Outcome Evaluation:  Plan of Care Reviewed With: patient        Progress: improving

## 2024-06-06 NOTE — CASE MANAGEMENT/SOCIAL WORK
Continued Stay Note  [facility]     Patient Name: Kirti Santillan  MRN: [id number]  Today's Date: 6/6/2024    Admit Date: 6/4/2024    Plan: SNF, pending pt and spouse decision on referrals   Discharge Plan       Row Name 06/06/24 1443       Plan    Plan SNF, pending pt and spouse decision on referrals    Patient/Family in Agreement with Plan yes    Plan Comments CCP met with pt and spouse at the bedside to discuss DC. CCP explained PT recs for SNF. Pt and spouse are agreeable. CCP discussed making referrals to facilities of their choice. CCP left SNF list and [facility] at the bedside. Pt and spouse to notify CCP of facilities they would like a referral. Yosvany RN/CCP                   Discharge Codes    No documentation.                 Expected Discharge Date and Time       Expected Discharge Date Expected Discharge Time    Jun 7, 2024               Jacqui Calderon, RN

## 2024-06-07 PROCEDURE — 97530 THERAPEUTIC ACTIVITIES: CPT

## 2024-06-07 RX ADMIN — APIXABAN 10 MG: 5 TABLET, FILM COATED ORAL at 08:10

## 2024-06-07 RX ADMIN — GABAPENTIN 200 MG: 100 CAPSULE ORAL at 20:11

## 2024-06-07 RX ADMIN — GABAPENTIN 200 MG: 100 CAPSULE ORAL at 08:10

## 2024-06-07 RX ADMIN — APIXABAN 10 MG: 5 TABLET, FILM COATED ORAL at 20:11

## 2024-06-07 RX ADMIN — ATORVASTATIN CALCIUM 10 MG: 20 TABLET, FILM COATED ORAL at 08:10

## 2024-06-07 RX ADMIN — ASPIRIN 81 MG: 81 TABLET, COATED ORAL at 08:10

## 2024-06-07 NOTE — THERAPY TREATMENT NOTE
Patient Name: Kirti Santillan  : 1945    MRN: [id number]                              Today's Date: 2024       Admit Date: 2024    Visit Dx:     ICD-10-CM ICD-9-CM   1. Acute deep vein thrombosis (DVT) of femoral vein of left lower extremity  I82.412 453.41     Patient Active Problem List   Diagnosis    Multiple falls    Abdominal aneurysm    Chronic back pain    Lumbar radiculopathy    Lumbar spinal stenosis    Primary hypertension    Prediabetes    Lumbar stenosis with neurogenic claudication    Displacement of lumbar intervertebral disc without myelopathy    Abnormal nuclear stress test    DVT (deep venous thrombosis)     Past Medical History:   Diagnosis Date    High blood pressure 11/3/2023    Low back pain     Lumbar radiculopathy 2023    Sciatica of left side      Past Surgical History:   Procedure Laterality Date    EPIDURAL BLOCK      EYE SURGERY      LUMBAR EPIDURAL INJECTION N/A 2024    Procedure: LUMBAR EPIDURAL 1ST VISIT L4-5;  Surgeon: Nikole Baig MD;  Location: [facility] [place];  Service: Pain Management;  Laterality: N/A;      General Information       Row Name 24 1101          Physical Therapy Time and Intention    Document Type therapy note (daily note)  -CS     Mode of Treatment individual therapy;physical therapy  -CS       Row Name 24 1101          General Information    Patient Profile Reviewed yes  -CS     Existing Precautions/Restrictions fall  -CS       Row Name 24 1101          Cognition    Orientation Status (Cognition) oriented x 3  -CS       Row Name 24 1101          Safety Issues, Functional Mobility    Impairments Affecting Function (Mobility) balance;endurance/activity tolerance;coordination;postural/trunk control;strength  -CS               User Key  (r) = Recorded By, (t) = Taken By, (c) = Cosigned By      Initials Name Provider Type    CS Viridiana Horta PT Physical Therapist                   Mobility       Row Name 24  1101          Bed Mobility    Bed Mobility supine-sit;sit-supine  -CS     Supine-Sit Willard (Bed Mobility) moderate assist (50% patient effort);verbal cues  -CS     Sit-Supine Willard (Bed Mobility) moderate assist (50% patient effort);verbal cues  -CS     Assistive Device (Bed Mobility) bed rails;head of bed elevated  -CS     Comment, (Bed Mobility) cues for sequencing + increased time to complete  -CS       Row Name 06/07/24 1101          Sit-Stand Transfer    Sit-Stand Willard (Transfers) minimum assist (75% patient effort);2 person assist;verbal cues  -CS     Assistive Device (Sit-Stand Transfers) walker, front-wheeled  -CS       Row Name 06/07/24 1101          Gait/Stairs (Locomotion)    Willard Level (Gait) minimum assist (75% patient effort);2 person assist;verbal cues;nonverbal cues (demo/gesture)  -CS     Assistive Device (Gait) walker, front-wheeled  -CS     Distance in Feet (Gait) 12  -CS     Deviations/Abnormal Patterns (Gait) davon decreased;gait speed decreased;stride length decreased;festinating/shuffling;weight shifting decreased  -CS     Bilateral Gait Deviations forward flexed posture;heel strike decreased  -CS     Right Sided Gait Deviations leans right  -CS     Comment, (Gait/Stairs) very slow shuffling gait; flexed posture that worsens with distance; max cues for upright posture and longer stride length  -CS               User Key  (r) = Recorded By, (t) = Taken By, (c) = Cosigned By      Initials Name Provider Type    Viridiana Vidal PT Physical Therapist                   Obj/Interventions       Row Name 06/07/24 1102          Balance    Balance Assessment sitting static balance;sitting dynamic balance;standing dynamic balance;standing static balance  -CS     Static Sitting Balance standby assist  -CS     Dynamic Sitting Balance standby assist;contact guard  -CS     Position, Sitting Balance unsupported;sitting edge of bed  -CS     Static Standing Balance minimal  assist  -CS     Dynamic Standing Balance minimal assist;2-person assist  -CS     Position/Device Used, Standing Balance supported;walker, front-wheeled  -CS               User Key  (r) = Recorded By, (t) = Taken By, (c) = Cosigned By      Initials Name Provider Type    Viridiana Vidal, PT Physical Therapist                   Goals/Plan    No documentation.                  Clinical Impression       Row Name 06/07/24 1103          Pain    Pretreatment Pain Rating 4/10  -CS     Posttreatment Pain Rating 4/10  -CS     Pain Location lower  -CS     Pain Location - back  -CS     Pain Intervention(s) Ambulation/increased activity;Rest;Repositioned  -CS       Row Name 06/07/24 1103          Plan of Care Review    Plan of Care Reviewed With patient  -CS     Progress improving  -CS     Outcome Evaluation Pt received in bed and agreeable to PT. Pt required mod A and increased time to complete bed mobility. Pt stood and ambulated 12' c RW requiring min A x 2. Pt demo's a very slow pace with flexed posture and shuffling gait that worsens with distance/fatigue. Max cues throughout for overall safety, upright posture, and sequencing. Pt continues to show poor activity tolerance. Pt returned to bed with all needs in reach. Pt plans to D/C to rehab soon.  -CS       Row Name 06/07/24 1103          Therapy Assessment/Plan (PT)    Criteria for Skilled Interventions Met (PT) yes;meets criteria  -CS     Therapy Frequency (PT) 5 times/wk  -CS       Row Name 06/07/24 1103          Positioning and Restraints    Pre-Treatment Position in bed  -CS     Post Treatment Position bed  -CS     In Bed fowlers;call light within reach;encouraged to call for assist;exit alarm on;heels elevated  -CS               User Key  (r) = Recorded By, (t) = Taken By, (c) = Cosigned By      Initials Name Provider Type    Viridiana Vidal, PT Physical Therapist                   Outcome Measures       Row Name 06/07/24 1105 06/07/24 0055       How much help  from another person do you currently need...    Turning from your back to your side while in flat bed without using bedrails? 3  -CS 3  -EA    Moving from lying on back to sitting on the side of a flat bed without bedrails? 2  -CS 3  -EA    Moving to and from a bed to a chair (including a wheelchair)? 2  -CS 2  -EA    Standing up from a chair using your arms (e.g., wheelchair, bedside chair)? 2  -CS 2  -EA    Climbing 3-5 steps with a railing? 1  -CS 2  -EA    To walk in hospital room? 2  -CS 2  -EA    AM-PAC 6 Clicks Score (PT) 12  -CS 14  -EA    Highest Level of Mobility Goal 4 --> Transfer to chair/commode  -CS 4 --> Transfer to chair/commode  -EA      Row Name 06/07/24 1105          Functional Assessment    Outcome Measure Options AM-PAC 6 Clicks Basic Mobility (PT)  -CS               User Key  (r) = Recorded By, (t) = Taken By, (c) = Cosigned By      Initials Name Provider Type    CS Viridiana Horta, JOSE Physical Therapist    Marisol Payan RN Registered Nurse                                 Physical Therapy Education       Title: PT OT SLP Therapies (In Progress)       Topic: Physical Therapy (In Progress)       Point: Mobility training (Done)       Learning Progress Summary             Patient Acceptance, E,TB, VU,DU,NR by  at 6/7/2024 1105    Acceptance, E,TB, NR by  at 6/6/2024 1109                         Point: Home exercise program (Not Started)       Learner Progress:  Not documented in this visit.              Point: Body mechanics (Done)       Learning Progress Summary             Patient Acceptance, E,TB, VU,DU,NR by  at 6/7/2024 1105    Acceptance, E,TB, NR by  at 6/6/2024 1109                         Point: Precautions (Done)       Learning Progress Summary             Patient Acceptance, E,TB, VU,DU,NR by  at 6/7/2024 1105                                         User Key       Initials Effective Dates Name Provider Type Discipline     09/22/22 -  Viridiana Horta, PT Physical  Therapist PT                  PT Recommendation and Plan     Plan of Care Reviewed With: patient  Progress: improving  Outcome Evaluation: Pt received in bed and agreeable to PT. Pt required mod A and increased time to complete bed mobility. Pt stood and ambulated 12' c RW requiring min A x 2. Pt demo's a very slow pace with flexed posture and shuffling gait that worsens with distance/fatigue. Max cues throughout for overall safety, upright posture, and sequencing. Pt continues to show poor activity tolerance. Pt returned to bed with all needs in reach. Pt plans to D/C to rehab soon.     Time Calculation:         PT Charges       Row Name 06/07/24 1105             Time Calculation    Start Time 0945  -CS      Stop Time 1004  -CS      Time Calculation (min) 19 min  -CS      PT Received On 06/07/24  -CS      PT - Next Appointment 06/10/24  -CS         Time Calculation- PT    Total Timed Code Minutes- PT 18 minute(s)  -CS         Timed Charges    23041 - Gait Training Minutes  8  -CS      85718 - PT Therapeutic Activity Minutes 10  -CS         Total Minutes    Timed Charges Total Minutes 18  -CS       Total Minutes 18  -CS                User Key  (r) = Recorded By, (t) = Taken By, (c) = Cosigned By      Initials Name Provider Type    CS Viridiana Horta, PT Physical Therapist                  Therapy Charges for Today       Code Description Service Date Service Provider Modifiers Qty    67538852516 HC PT THERAPEUTIC ACT EA 15 MIN 6/6/2024 Viridiana Horta, PT GP 1    07361548666  PT EVAL MOD COMPLEXITY 3 6/6/2024 Viridiana Horta, PT GP 1    99291755536 HC PT THERAPEUTIC ACT EA 15 MIN 6/7/2024 Viridiana Horta, PT GP 1            PT G-Codes  Outcome Measure Options: AM-PAC 6 Clicks Basic Mobility (PT)  AM-PAC 6 Clicks Score (PT): 12  AM-PAC 6 Clicks Score (OT): 15  PT Discharge Summary  Anticipated Discharge Disposition (PT): skilled nursing facility    Viridiana Horta PT  6/7/2024

## 2024-06-07 NOTE — PLAN OF CARE
Goal Outcome Evaluation:  Plan of Care Reviewed With: patient        Progress: improving  Outcome Evaluation: Pt received in bed and agreeable to PT. Pt required mod A and increased time to complete bed mobility. Pt stood and ambulated 12' c RW requiring min A x 2. Pt demo's a very slow pace with flexed posture and shuffling gait that worsens with distance/fatigue. Max cues throughout for overall safety, upright posture, and sequencing. Pt continues to show poor activity tolerance. Pt returned to bed with all needs in reach. Pt plans to D/C to rehab soon.      Anticipated Discharge Disposition (PT): skilled nursing facility

## 2024-06-07 NOTE — CASE MANAGEMENT/SOCIAL WORK
Continued Stay Note  [facility]     Patient Name: Kirti Santillan  MRN: [id number]  Today's Date: 6/7/2024    Admit Date: 6/4/2024    Plan: [facility] SNF, bed available tomorrow. Family to transport.   Discharge Plan       Row Name 06/07/24 1153       Plan    Plan [facility] SNF, bed available tomorrow. Family to transport.    Patient/Family in Agreement with Plan yes    Plan Comments Lauren/Terry notified CCP pt has been accepted at [facility] and bed available tomorrow. Kathia/Betty notified CCP pt has been accepted but weekend CCP will have to call to confirm bed availability. CCP met with pt at bedside to update. CCP explained [facility] has accepted and bed available. CCP also explained [facility] has clinically accepted but cannot confirm bed availability at this time. Pt is agreeable to [facility]. CCP discussed transportation at [place]; pt stated family will transport. Osmin/Terry notified of pts acceptance of bed at [facility]. Partial packet in [facility], pharmacy updated to [facility] in [facility]. Yosvany KOLB/CCP                   Discharge Codes    No documentation.                 Expected Discharge Date and Time       Expected Discharge Date Expected Discharge Time    Jun 8, 2024               Jacqui Calderon, CORTES

## 2024-06-07 NOTE — PLAN OF CARE
Goal Outcome Evaluation:  Plan of Care Reviewed With: patient        Progress: improving  Outcome Evaluation: vss, ra, sr, a/o x 4. Q2 turns. Wound care complete. Plan is d/c to SNF tomorrow.

## 2024-06-08 ENCOUNTER — HOME HEALTH ADMISSION (OUTPATIENT)
Dept: HOME HEALTH SERVICES | Facility: HOME HEALTHCARE | Age: 79
End: 2024-06-08
Payer: MEDICARE

## 2024-06-08 VITALS
HEART RATE: 75 BPM | RESPIRATION RATE: 16 BRPM | DIASTOLIC BLOOD PRESSURE: 80 MMHG | HEIGHT: 63 IN | WEIGHT: 177.6 LBS | BODY MASS INDEX: 31.47 KG/M2 | OXYGEN SATURATION: 100 % | SYSTOLIC BLOOD PRESSURE: 146 MMHG | TEMPERATURE: 97.7 F

## 2024-06-08 RX ORDER — GABAPENTIN 100 MG/1
100 CAPSULE ORAL 2 TIMES DAILY
Qty: 6 CAPSULE | Refills: 0 | Status: SHIPPED | OUTPATIENT
Start: 2024-06-08

## 2024-06-08 RX ADMIN — APIXABAN 10 MG: 5 TABLET, FILM COATED ORAL at 09:07

## 2024-06-08 RX ADMIN — ASPIRIN 81 MG: 81 TABLET, COATED ORAL at 09:07

## 2024-06-08 RX ADMIN — GABAPENTIN 200 MG: 100 CAPSULE ORAL at 09:07

## 2024-06-08 RX ADMIN — ATORVASTATIN CALCIUM 10 MG: 20 TABLET, FILM COATED ORAL at 09:07

## 2024-06-08 NOTE — PLAN OF CARE
Problem: Fall Injury Risk  Goal: Absence of Fall and Fall-Related Injury  Outcome: Adequate for Care Transition  Intervention: Identify and Manage Contributors  Recent Flowsheet Documentation  Taken 6/8/2024 0800 by Connie Prescott RN  Medication Review/Management: medications reviewed  Intervention: Promote Injury-Free Environment  Recent Flowsheet Documentation  Taken 6/8/2024 0800 by Connie Prescott, RN  Safety Promotion/Fall Prevention:   activity supervised   clutter free environment maintained   Goal Outcome Evaluation:

## 2024-06-08 NOTE — NURSING NOTE
Report called to Jennifer at [facility] with all questions answered.  PIV removed and patients  will transport her to [facility].  DC summary reviewed with patient and her spouse with understanding verbalized.

## 2024-06-08 NOTE — PLAN OF CARE
Goal Outcome Evaluation:            Outcome Evaluation: vss. normal sinus rhythm on the monitor. on room air. prescribed medications given . Looking fokrward to discharge today.

## 2024-06-08 NOTE — DISCHARGE SUMMARY
Patient Name: Kirti Santillan  : 1945  MRN: [id number]    Date of Admission: 2024  Date of Discharge:  2024  Primary Care Physician: Deangelo Sprague MD      Chief Complaint:   Leg Pain      Discharge Diagnoses     Active Hospital Problems    Diagnosis  POA    **DVT (deep venous thrombosis) [I82.409]  Yes    Abnormal nuclear stress test [R94.39]  Yes    Prediabetes [R73.03]  Yes    Abdominal aneurysm [I71.40]  Yes      Resolved Hospital Problems   No resolved problems to display.        Hospital Course     Ms. Santillan is a 78 y.o. female with a history of an abnormal stress test with presumed coronary artery disease (no plans for cardiac catheterization per her last cardiology note), essential hypertension, hyperlipidemia, primary hyperparathyroidism, AAA, lumbar spinal stenosis, age-related physical debility with recently decreased mobility who presented to [facility] initially complaining of left lower extremity swelling.  Please see the admitting history and physical for further details.  She was found to have acute left lower extremity DVT as well as difficulty ambulating and was admitted to the hospital for further evaluation and treatment.      The ambulation difficulties were unrelated to the DVT, and predominantly related to age-related physical debility.  She was started on therapeutic Lovenox and then quickly transition to Eliquis without issue.  I think that this DVT is provoked by immobility, and so 3 to 6 months of therapy should be adequate assuming that she regains mobility.  If she continues having difficulty with immobility, then consideration should be given to an indefinite course of anticoagulation.  She will be discharged to subacute rehab today.    Day of Discharge     Subjective:  No events overnight.  No complaints.    Physical Exam:  Temp:  [97.7 °F (36.5 °C)-98.6 °F (37 °C)] 97.7 °F (36.5 °C)  Heart Rate:  [74-82] 75  Resp:  [16-18] 16  BP:  (121-146)/(66-80) 146/80  Body mass index is 31.46 kg/m².  Physical Exam  Constitutional:       General: She is not in acute distress.     Appearance: She is not toxic-appearing.   Cardiovascular:      Rate and Rhythm: Normal rate and regular rhythm.      Heart sounds: Normal heart sounds.   Pulmonary:      Effort: Pulmonary effort is normal.      Breath sounds: Normal breath sounds.   Abdominal:      General: Bowel sounds are normal.      Palpations: Abdomen is soft.   Musculoskeletal:         General: No tenderness.      Right lower leg: No edema.      Left lower leg: No edema.   Neurological:      Mental Status: She is alert.   Psychiatric:         Mood and Affect: Mood normal.         Behavior: Behavior normal.         Consultants     Consult Orders (all) (From admission, onward)       Start     Ordered    06/04/24 1739  [facility] (on-call MD unless specified) Details  Once        Specialty:  Hospitalist  Provider:  Hermelinda Martin MD    06/04/24 1738                  Procedures     Imaging Results (All)       None            Pertinent Labs     Results from last 7 days   Lab Units 06/06/24  0421 06/05/24  0342 06/04/24 1708 06/04/24  1337   WBC 10*3/mm3 7.73 7.18 9.77 8.02   HEMOGLOBIN g/dL 11.7* 11.5* 13.4 13.3   PLATELETS 10*3/mm3 241 221 259 277     Results from last 7 days   Lab Units 06/06/24  0421 06/05/24  0342 06/04/24 1708 06/04/24  1337   SODIUM mmol/L 135* 137 136 139   POTASSIUM mmol/L 4.2 3.7 4.0 4.0   CHLORIDE mmol/L 101 103 101 98   CO2 mmol/L 27.1 27.0 26.0 26.2   BUN mg/dL 10 10 13 12   CREATININE mg/dL 0.87 0.57 0.77 0.83   GLUCOSE mg/dL 116* 118* 146* 218*   Estimated Creatinine Clearance: 52.7 mL/min (by C-G formula based on SCr of 0.87 mg/dL).  Results from last 7 days   Lab Units 06/04/24 1708 06/04/24  1337   ALBUMIN g/dL 3.4* 3.7   BILIRUBIN mg/dL 0.4 0.4   ALK PHOS U/L 137* 145*   AST (SGOT) U/L 6 9   ALT (SGPT) U/L 7 6     Results from last 7 days   Lab Units 06/06/24  3839  "06/05/24  0342 06/04/24  1708 06/04/24  1337   CALCIUM mg/dL 9.8 9.6 10.3 10.5   ALBUMIN g/dL  --   --  3.4* 3.7               Invalid input(s): \"LDLCALC\"        Test Results Pending at Discharge       Discharge Details        Discharge Medications        New Medications        Instructions Start Date   apixaban 5 MG tablet tablet  Commonly known as: ELIQUIS   Take 2 tablets by mouth Every 12 (Twelve) Hours for 5 days, THEN 1 tablet Every 12 (Twelve) Hours for 90 days. Indications: DVT/PE (active thrombosis)   Start Date: June 8, 2024            Changes to Medications        Instructions Start Date   gabapentin 100 MG capsule  Commonly known as: NEURONTIN  What changed: Another medication with the same name was removed. Continue taking this medication, and follow the directions you see here.   100 mg, Oral, 2 Times Daily             Continue These Medications        Instructions Start Date   aspirin 81 MG EC tablet   81 mg, Oral, Daily      atorvastatin 10 MG tablet  Commonly known as: LIPITOR   10 mg, Oral, Daily             Stop These Medications      Advil 200 MG capsule  Generic drug: Ibuprofen     CBD oral oil  Commonly known as: cannabidiol     vitamin D 1.25 MG (72030 UT) capsule capsule  Commonly known as: ERGOCALCIFEROL              Allergies   Allergen Reactions    Hydrocodone Nausea And Vomiting         Discharge Disposition:  Skilled Nursing Facility (DC - External)    Discharge Diet:  Diet Order   Procedures    Diet: Cardiac; Healthy Heart (2-3 Na+); Fluid Consistency: Thin (IDDSI 0)       Discharge Activity:   Activity Instructions       Activity as Tolerated              CODE STATUS:    Code Status and Medical Interventions:   Ordered at: 06/04/24 1747     Code Status (Patient has no pulse and is not breathing):    CPR (Attempt to Resuscitate)     Medical Interventions (Patient has pulse or is breathing):    Full       Future Appointments   Date Time Provider Department Center   7/10/2024  1:00 PM " Deangelo Sprague MD [facility]   7/11/2024  1:40 PM Nikole Baig MD [facility] PM EASPT [facility]   10/29/2024  1:00 PM Tang Cyr MD [facility] LCGKR [facility]   3/12/2025  1:00 PM Beltran Borges, DO [facility]  [facility]     Additional Instructions for the Follow-ups that You Need to Schedule       Call MD With Problems / Concerns   As directed      Instructions: return to the hospital if you experience chest pain, shortness of breath, abdominal pain, nausea, vomiting, fevers, sweats, chills, or worsening of your symptoms    Order Comments: Instructions: return to the hospital if you experience chest pain, shortness of breath, abdominal pain, nausea, vomiting, fevers, sweats, chills, or worsening of your symptoms         Discharge Follow-up with PCP   As directed       Currently Documented PCP:    Deangelo Sprague MD    PCP Phone Number:    [phone]     Follow Up Details: 2 weeks               Contact information for follow-up providers       Deangelo Sprague MD .    Specialty: Internal Medicine  Why: 2 weeks  Contact information:  [address]  [address]  [facility] [address]  [phone]                       Contact information for after-discharge care       Destination       [facility] .    Service: Skilled Nursing  Contact information:  [address]  [facility] [id number] [phone]                                   Additional Instructions for the Follow-ups that You Need to Schedule       Call MD With Problems / Concerns   As directed      Instructions: return to the hospital if you experience chest pain, shortness of breath, abdominal pain, nausea, vomiting, fevers, sweats, chills, or worsening of your symptoms    Order Comments: Instructions: return to the hospital if you experience chest pain, shortness of breath, abdominal pain, nausea, vomiting, fevers, sweats, chills, or worsening of your symptoms         Discharge Follow-up with PCP   As directed       Currently Documented PCP:     Deangelo Sprague MD    PCP Phone Number:    [phone]     Follow Up Details: 2 weeks            Time Spent on Discharge:  Greater than 30 minutes      Darci Williamson MD  [facility]  06/08/24  10:22 EDT

## 2024-06-09 NOTE — CASE MANAGEMENT/SOCIAL WORK
Case Management Discharge Note      Final Note: [facility]         Selected Continued Care - Discharged on 6/8/2024 Admission date: 6/4/2024 - Discharge disposition: Skilled Nursing Facility (DC - External)      Destination Coordination complete.      Service Provider Selected Services Address Phone Fax Patient Preferred    [facility] [address] [place] [address] [phone] --              Durable Medical Equipment    No services have been selected for the patient.                Dialysis/Infusion    No services have been selected for the patient.                Home Medical Care    No services have been selected for the patient.                Therapy    No services have been selected for the patient.                Community Resources    No services have been selected for the patient.                Community & DME    No services have been selected for the patient.                         Final Discharge Disposition Code: 03 - skilled nursing facility (SNF)

## 2024-06-12 ENCOUNTER — TELEPHONE (OUTPATIENT)
Dept: INTERNAL MEDICINE | Facility: CLINIC | Age: 79
End: 2024-06-12
Payer: MEDICARE

## 2024-06-12 NOTE — TELEPHONE ENCOUNTER
Elena with [facility] called to let Dr. Deangelo Sprague know that they were finally able to reach the patient after multiple attempts and spoke with the patients spouse, however the patient is not aware of the diagnosis for what she is being referred for, please advise?

## 2024-06-12 NOTE — TELEPHONE ENCOUNTER
Please let her know that it is for an enlargement of her vessel in her abdomen, which I showed her pictures of when she was last seen in our office. This needs to be evaluated by the vascular specialists to make sure this does not progress and get larger, because this can be a medical emergency if it ruptures. The specific diagnosis/medical term is abdominal aortic aneurysm

## 2024-06-17 ENCOUNTER — OFFICE VISIT (OUTPATIENT)
Age: 79
End: 2024-06-17
Payer: MEDICARE

## 2024-06-17 ENCOUNTER — HOSPITAL ENCOUNTER (OUTPATIENT)
Facility: HOSPITAL | Age: 79
Discharge: HOME | End: 2024-06-17
Admitting: SURGERY
Payer: MEDICARE

## 2024-06-17 VITALS
WEIGHT: 167 LBS | BODY MASS INDEX: 29.59 KG/M2 | SYSTOLIC BLOOD PRESSURE: 112 MMHG | DIASTOLIC BLOOD PRESSURE: 71 MMHG | HEART RATE: 87 BPM | HEIGHT: 63 IN

## 2024-06-17 DIAGNOSIS — I71.43 INFRARENAL ABDOMINAL AORTIC ANEURYSM (AAA) WITHOUT RUPTURE: ICD-10-CM

## 2024-06-17 DIAGNOSIS — I71.40 ABDOMINAL ANEURYSM: Primary | ICD-10-CM

## 2024-06-17 DIAGNOSIS — I71.43 INFRARENAL ABDOMINAL AORTIC ANEURYSM, WITHOUT RUPTURE: ICD-10-CM

## 2024-06-17 LAB
ABDOMINAL DIST AORTA AP: 2.56 CM
ABDOMINAL DIST AORTA TRANS: 2.54 CM
ABDOMINAL DIST AORTA VEL: 43.4 CM/S
ABDOMINAL LT COM ILIAC AP: 1.13 CM
ABDOMINAL LT COM ILIAC VEL: 90.3 CM/S
ABDOMINAL LT EXT ILIAC VEL: 153 CM/S
ABDOMINAL MID AORTA AP: 2.2 CM
ABDOMINAL MID AORTA TRANS: 2.21 CM
ABDOMINAL MID AORTA VEL: 83 CM/S
ABDOMINAL PROX AORTA AP: 2.36 CM
ABDOMINAL PROX AORTA TRANS: 2.34 CM
ABDOMINAL PROX AORTA VEL: 76.6 CM/S
ABDOMINAL RT COM ILIAC AP: 1.23 CM
ABDOMINAL RT COM ILIAC VEL: 69.1 CM/S
ABDOMINAL RT EXT ILIAC VEL: 147 CM/S
BH CV VAS ABD AO LT EXTERNAL ILIAC AP: 0.79 CM
BH CV VAS ABD AO RT EXTERNAL ILIAC AP: 0.88 CM
BH CV VAS ABDOMINAL AO RESIDUAL LUMEN AP MEASURE: 4.49 CM
BH CV VAS ABDOMINAL AO RESIDUAL LUMEN TRANS MEASURE: 4.55 CM

## 2024-06-17 PROCEDURE — G2211 COMPLEX E/M VISIT ADD ON: HCPCS | Performed by: NURSE PRACTITIONER

## 2024-06-17 PROCEDURE — 93978 VASCULAR STUDY: CPT | Performed by: SURGERY

## 2024-06-17 PROCEDURE — 3074F SYST BP LT 130 MM HG: CPT | Performed by: NURSE PRACTITIONER

## 2024-06-17 PROCEDURE — 99214 OFFICE O/P EST MOD 30 MIN: CPT | Performed by: NURSE PRACTITIONER

## 2024-06-17 PROCEDURE — 93978 VASCULAR STUDY: CPT

## 2024-06-17 PROCEDURE — 3078F DIAST BP <80 MM HG: CPT | Performed by: NURSE PRACTITIONER

## 2024-06-17 NOTE — PROGRESS NOTES
Chief Complaint  Follow-up (F/u with scans)    Subjective        Kirti Santillan presents to [facility] VASCULAR SURGERY  HPI   Kirti Santillan is a 78 y.o. female that has been followed in our office for an abdominal aortic aneurysm.  In 2023, she was admitted to the hospital for weakness and falls.  She had imaging that led to an incidental diagnosis of 4.3 cm infrarenal abdominal aortic aneurysm.    She returns today in follow-up along with an aortic duplex. She   reports she  has been doing well without hospitalizations or surgeries. She  denies any worsening abdominal pain, back pain, or pain that radiates to her groin. She denies any claudication symptoms, rest pain, or tissue loss.  On further questioning, it appears as though her sister  of a ruptured aortic aneurysm.    Review of Systems   Constitutional:  Negative for fever.   Eyes:  Negative for visual disturbance.   Cardiovascular:  Negative for leg swelling.   Gastrointestinal:  Negative for abdominal pain.   Musculoskeletal:  Negative for back pain.   Skin:  Negative for color change, pallor and wound.   Neurological:  Negative for dizziness, facial asymmetry, speech difficulty and weakness.        Kirti Santillan  reports that she has quit smoking. Her smoking use included cigarettes. She has been exposed to tobacco smoke. She has never used smokeless tobacco..        Objective   Vital Signs:  Vitals:    24 1009   BP: 112/71   Pulse: 87      Body mass index is 29.58 kg/m².           Physical Exam  Vitals reviewed.   Constitutional:       Appearance: Normal appearance.   HENT:      Head: Normocephalic.   Cardiovascular:      Rate and Rhythm: Normal rate and regular rhythm.      Pulses: Normal pulses.           Dorsalis pedis pulses are 3+ on the right side and 3+ on the left side.        Posterior tibial pulses are 3+ on the right side and 3+ on the left side.   Pulmonary:      Effort: Pulmonary effort is normal.    Skin:     General: Skin is warm.   Neurological:      General: No focal deficit present.      Mental Status: She is alert and oriented to person, place, and time.   Psychiatric:         Mood and Affect: Mood normal.        Result Review :    Consults by Sherman Wilson MD (11/01/2023 10:12)     CT Angiogram Abdomen Pelvis (10/31/2023 16:07)       Aortic duplex done today: Duplex Aorta IVC Iliac Graft Complete CAR (06/17/2024 09:23)                     Assessment and Plan     Diagnoses and all orders for this visit:    1. Abdominal aneurysm (Primary)  -     Duplex Aorta IVC Iliac Graft Complete CAR; Future    2. Infrarenal abdominal aortic aneurysm, without rupture  -     Duplex Aorta IVC Iliac Graft Complete CAR; Future        Patient presents today for follow up of her abdominal aortic aneurysm.  This is stable at 4.5 cm.  We discussed indications for surgical repair once it reaches 5 cm.  We did discuss the familiar component of aneurysmal disease and I recommended that her brother and son be screened as her sister did die of a ruptured aneurysm per her account.  We discussed adequate blood pressure control.  She is on a statin for cholesterol control.  She  will return in 6 months along with aortic duplex.            Follow Up     Return in about 6 months (around 12/17/2024) for aortic duplex.  Patient was given instructions and counseling regarding her condition or for health maintenance advice. Please see specific information pulled into the AVS if appropriate.     HUI Pang

## 2024-07-10 ENCOUNTER — OFFICE VISIT (OUTPATIENT)
Dept: INTERNAL MEDICINE | Facility: CLINIC | Age: 79
End: 2024-07-10
Payer: MEDICARE

## 2024-07-10 VITALS
OXYGEN SATURATION: 97 % | BODY MASS INDEX: 29.59 KG/M2 | HEIGHT: 63 IN | TEMPERATURE: 97.1 F | DIASTOLIC BLOOD PRESSURE: 82 MMHG | WEIGHT: 167 LBS | HEART RATE: 99 BPM | SYSTOLIC BLOOD PRESSURE: 124 MMHG

## 2024-07-10 DIAGNOSIS — M79.89 LEG SWELLING: ICD-10-CM

## 2024-07-10 DIAGNOSIS — I82.502 CHRONIC DEEP VEIN THROMBOSIS (DVT) OF LEFT LOWER EXTREMITY, UNSPECIFIED VEIN: Primary | ICD-10-CM

## 2024-07-10 DIAGNOSIS — E55.9 VITAMIN D DEFICIENCY: ICD-10-CM

## 2024-07-10 DIAGNOSIS — M54.16 LUMBAR RADICULOPATHY: ICD-10-CM

## 2024-07-10 DIAGNOSIS — R54 AGE-RELATED PHYSICAL DEBILITY: ICD-10-CM

## 2024-07-10 DIAGNOSIS — R29.6 MULTIPLE FALLS: ICD-10-CM

## 2024-07-10 DIAGNOSIS — R73.03 PRE-DIABETES: ICD-10-CM

## 2024-07-10 DIAGNOSIS — I10 PRIMARY HYPERTENSION: ICD-10-CM

## 2024-07-10 PROCEDURE — 3074F SYST BP LT 130 MM HG: CPT | Performed by: STUDENT IN AN ORGANIZED HEALTH CARE EDUCATION/TRAINING PROGRAM

## 2024-07-10 PROCEDURE — 99214 OFFICE O/P EST MOD 30 MIN: CPT | Performed by: STUDENT IN AN ORGANIZED HEALTH CARE EDUCATION/TRAINING PROGRAM

## 2024-07-10 PROCEDURE — 1125F AMNT PAIN NOTED PAIN PRSNT: CPT | Performed by: STUDENT IN AN ORGANIZED HEALTH CARE EDUCATION/TRAINING PROGRAM

## 2024-07-10 PROCEDURE — 3079F DIAST BP 80-89 MM HG: CPT | Performed by: STUDENT IN AN ORGANIZED HEALTH CARE EDUCATION/TRAINING PROGRAM

## 2024-07-10 PROCEDURE — G2211 COMPLEX E/M VISIT ADD ON: HCPCS | Performed by: STUDENT IN AN ORGANIZED HEALTH CARE EDUCATION/TRAINING PROGRAM

## 2024-07-10 RX ORDER — GABAPENTIN 300 MG/1
300 CAPSULE ORAL 3 TIMES DAILY
Qty: 90 CAPSULE | Refills: 0 | Status: SHIPPED | OUTPATIENT
Start: 2024-07-10

## 2024-07-10 RX ORDER — FUROSEMIDE 20 MG/1
20 TABLET ORAL DAILY
Qty: 30 TABLET | Refills: 0 | Status: SHIPPED | OUTPATIENT
Start: 2024-07-10

## 2024-07-10 NOTE — PROGRESS NOTES
"Chief Complaint  Leg Swelling and Pain    Subjective        Kirti Santillan presents to [facility] PRIMARY CARE  History of Present Illness    Ms. Santillan presents to clinic today for follow-up    Since last visit she has started Eliquis for DVT that was discovered at previous visit with me.  She is tolerating well without any side effects at this moment.  She continues to have bilateral leg swelling that is worse on the left however is somewhat improved since starting Eliquis    She does report continued back pain in which she follows with pain medicine for.  She has a epidural injection scheduled for tomorrow however she is considering not going as she said she had no relief at last injection        Review of Systems   Cardiovascular:  Positive for leg swelling.   Musculoskeletal:  Positive for arthralgias, back pain and gait problem.   Hematological:  Does not bruise/bleed easily.        Objective   Vital Signs:  /82   Pulse 99   Temp 97.1 °F (36.2 °C) (Temporal)   Ht 160 cm (63\")   Wt 75.8 kg (167 lb)   SpO2 97%   BMI 29.58 kg/m²   Estimated body mass index is 29.58 kg/m² as calculated from the following:    Height as of this encounter: 160 cm (63\").    Weight as of this encounter: 75.8 kg (167 lb).               Physical Exam  Vitals reviewed.   Constitutional:       General: She is not in acute distress.     Appearance: Normal appearance. She is not toxic-appearing.   HENT:      Head: Normocephalic and atraumatic.   Eyes:      General: No scleral icterus.     Conjunctiva/sclera: Conjunctivae normal.   Musculoskeletal:      Right lower leg: Edema present.      Left lower leg: Edema present.   Skin:     General: Skin is warm and dry.   Neurological:      Mental Status: She is alert and oriented to person, place, and time. Mental status is at baseline.      Motor: Weakness present.      Gait: Gait abnormal.   Psychiatric:         Mood and Affect: Mood normal.         Behavior: " Behavior normal.        Result Review :                   Assessment and Plan   Diagnoses and all orders for this visit:    1. Chronic deep vein thrombosis (DVT) of left lower extremity, unspecified vein (Primary)  -     CBC & Differential; Future    2. Lumbar radiculopathy  -     gabapentin (NEURONTIN) 300 MG capsule; Take 1 capsule by mouth 3 (Three) Times a Day.  Dispense: 90 capsule; Refill: 0    3. Leg swelling  -     furosemide (LASIX) 20 MG tablet; Take 1 tablet by mouth Daily.  Dispense: 30 tablet; Refill: 0    4. Multiple falls  -     Ambulatory Referral to Home Health    5. Age-related physical debility  -     Ambulatory Referral to Home Health    6. Pre-diabetes  -     Comprehensive Metabolic Panel; Future  -     Hemoglobin A1c; Future    7. Primary hypertension  -     Comprehensive Metabolic Panel; Future    8. Vitamin D deficiency  -     Vitamin D,25-Hydroxy; Future        Reviewed hospitalization, labs    She is tolerating Eliquis well with no signs of bleeding, will plan to continue this.  I suspect it will take some time for her lower extremity swelling to improve, however will trial low-dose of diuretic to see if improves    Her back pain and radiculopathy is chronic and continues to worsen despite epidural performed by pain medicine.  I recommended she speak with pain medicine regarding continuing injections versus other options for back pain.  In the meantime we will try to increase her gabapentin from 100 mg twice daily to 300 mg 3 times daily.  Advised of possible side effects    Will also plan to refer to home health as she is unable to perform ADLs and significantly disabled due to chronic back pain    RTC in about 5 months for AWV or sooner prn. Labs prior to visit              Follow Up   Return in about 20 weeks (around 11/27/2024) for Medicare Wellness.  Patient was given instructions and counseling regarding her condition or for health maintenance advice. Please see specific information  pulled into the AVS if appropriate.

## 2024-07-11 ENCOUNTER — HOME CARE VISIT (OUTPATIENT)
Dept: HOME HEALTH SERVICES | Facility: HOME HEALTHCARE | Age: 79
End: 2024-07-11

## 2024-07-11 ENCOUNTER — HOME HEALTH ADMISSION (OUTPATIENT)
Dept: HOME HEALTH SERVICES | Facility: HOME HEALTHCARE | Age: 79
End: 2024-07-11
Payer: MEDICARE

## 2024-07-15 ENCOUNTER — HOME CARE VISIT (OUTPATIENT)
Dept: HOME HEALTH SERVICES | Facility: HOME HEALTHCARE | Age: 79
End: 2024-07-15
Payer: MEDICARE

## 2024-07-15 VITALS
RESPIRATION RATE: 18 BRPM | OXYGEN SATURATION: 94 % | DIASTOLIC BLOOD PRESSURE: 68 MMHG | SYSTOLIC BLOOD PRESSURE: 120 MMHG | TEMPERATURE: 97 F | HEART RATE: 78 BPM

## 2024-07-15 VITALS
TEMPERATURE: 97 F | HEART RATE: 78 BPM | SYSTOLIC BLOOD PRESSURE: 120 MMHG | OXYGEN SATURATION: 96 % | DIASTOLIC BLOOD PRESSURE: 68 MMHG

## 2024-07-15 PROCEDURE — G0152 HHCP-SERV OF OT,EA 15 MIN: HCPCS

## 2024-07-15 PROCEDURE — G0151 HHCP-SERV OF PT,EA 15 MIN: HCPCS

## 2024-07-15 NOTE — HOME HEALTH
CURRENT SITUATION: Pt presented to [facility] with c/o LLE swelling and immobility x1 wk. She was admitted 6/04/24 to 6/08/24 w/acute LLE DVT. Her ambulation difficulties were unrelated to the DVT, and predominantly related to age-related physical debility. She was d/c'd to [facility] and then to home. She returned to her PCP on 7/10/24 for f/u appt. Due to her immobility and limited independece w/her ADLs, she was referred to  PT/OT  PMHx: abnormal stress test w/presumed CAD, essential HTN, HLD, chronic back pain, primary hyerparathyroidism, AAA, lumbar spinal stenosis, lumbar radiculopathy (11/2023), sciatica on her left side. She has had epidural injections/blocks, most recent on 5-06-24, L-4/5 (w/o relief per pt report.)  OT's FOCUS OF CARE: Lumbar stenosis w/radiculopathy.  SOCIAL & ENVIRONMENTAL SITUATION: Pt lives w/spouse in a ranch style home w/a basement and steps or ramp at entry points. The TV room where they spend most of their time has 2 steps to enter off the kitchen. She has been sleeping in her lift chair and using a BSC in the TV room. PLOF; she was ambulatory w/walker and sleeping in her bed and using BA off bedroom for toileting and showering.  INTERVENTIONS: OT Eval, ADL training, Home Safety, Therapeutic Exercise/Activity, Transfer/Mobility training, Monitor vitals including SPO2 via pulse-oximeter (notifiy MD if resting O2 <90% on room air), Patient/CG education, Falls-risk prevention, Recommendations on AE, DME, AD, environmental adaptations.

## 2024-07-15 NOTE — HOME HEALTH
"SOC Note:    Home Health ordered for: disciplines PT 2w4, OT eval ordered, PT to seek MSW for home aide services available    Reason for Hosp/Primary Dx/Co-morbidities: patient saw MD on [id number] who ordered home PT/OT due to continued debility from lumbar radiculopathy due to spinal stenosis with difficulty moving left leg when walking; patient reported she went to [facility] subacute rehab following hospitalization at [facility] [id number] with acute LLE DVT and was there from [id number] to possibly [id number] (they could not remember date and need to find dc paperwork form [facility]); patient reports due to swelling in leg from DVT she had more difficulty lifting left leg; PMHx: CAD (no plans for cardiac catheterization per her last cardiology note), Hyperlipidemia, Primary hyperparathyroidism, AAA, Lumbar spinal stenosis, Age-related physical debility with recently decreased mobility    Focus of Care: Lumbar stenosis with leg leg radiculopathy with increased weakness due to immobility from left leg DVT with skilled PT needed for transfer training, gait training with rolling walker, stair training, HEP for left leg strengthening and generalized strength/balance training, and patient spouse education for medication knowledge, risk for skin breakdown, pain edema control measures, and home safety/mobility assistance, in order to improve safe household mobility    Patient's goal(s): \"I want to get some strength back so I can get up and move thru home or to commode by myself.\"    Current Functional status/mobility/DME: min assist gait/transfers with difficulty moving left leg to advance or turn when pivoting with rollator; DME rollator, bedside commode, lift recliner, shower seat, grab bars, wc ramp out of home    HB status/Living Arrangements: lives with spouse and son who assist her as needed; spouse assists with 2 steps inside the house and has wc ramp off front porch    Skin Integrity/wound status: intact    Code " Status: full    Fall Risk/Safety concerns: high falls risk due to hx of falls and generalized weakness and balance issues    Educated on Emergency Plan, steps to take prior to going to the ER and when to Call Home Health First:  yes with understanding     Medication issues/Concerns: none    Additional Problems/Concerns: none    SDOH Barriers (i.e. caregiver concerns, social isolation, transportation, food insecurity, environment, income etc.)/Need for MSW: would like info on personal aide care so will get MSW orders

## 2024-07-15 NOTE — Clinical Note
"Home health guidelines require MD notification of planned frequency and plan of care      SOC Note:    Home Health ordered for: disciplines PT 2w4, OT eval ordered, PT to seek MSW for home aide services available    Reason for Hosp/Primary Dx/Co-morbidities: patient saw MD on 071024 who ordered home PT/OT due to continued debility from lumbar radiculopathy due to spinal stenosis with difficulty moving left leg when walking; patient reported she went to [facility] subacute rehab following hospitalization at [facility] [id number] with acute LLE DVT and was there from [id number] to possibly [id number] (they could not remember date and need to find dc paperwork form [facility]); patient reports due to swelling in leg from DVT she had more difficulty lifting left leg; PMHx: CAD (no plans for cardiac catheterization per her last cardiology note), Hyperlipidemia, Primary hyperparathyroidism, AAA, Lumbar spinal stenosis, Age-related physical debility with recently decreased mobility    Focus of Care: Lumbar stenosis with leg leg radiculopathy with increased weakness due to immobility from left leg DVT with skilled PT needed for transfer training, gait training with rolling walker, stair training, HEP for left leg strengthening and generalized strength/balance training, and patient spouse education for medication knowledge, risk for skin breakdown, pain edema control measures, and home safety/mobility assistance, in order to improve safe household mobility    Patient's goal(s): \"I want to get some strength back so I can get up and move thru home or to commode by myself.\"    Current Functional status/mobility/DME: min assist gait/transfers with difficulty moving left leg to advance or turn when pivoting with rollator; DME rollator, bedside commode, lift recliner, shower seat, grab bars, wc ramp out of home    HB status/Living Arrangements: lives with spouse and son who assist her as needed; spouse assists with 2 steps inside " the house and has wc ramp off front porch    Skin Integrity/wound status: intact    Code Status: full    Fall Risk/Safety concerns: high falls risk due to hx of falls and generalized weakness and balance issues    Educated on Emergency Plan, steps to take prior to going to the ER and when to Call Home Health First:  yes with understanding     Medication issues/Concerns: none    Additional Problems/Concerns: none    SDOH Barriers (i.e. caregiver concerns, social isolation, transportation, food insecurity, environment, income etc.)/Need for MSW: would like info on personal aide care so will get MSW orders

## 2024-07-16 ENCOUNTER — TELEPHONE (OUTPATIENT)
Dept: INTERNAL MEDICINE | Facility: CLINIC | Age: 79
End: 2024-07-16
Payer: MEDICARE

## 2024-07-16 DIAGNOSIS — R54 AGE-RELATED PHYSICAL DEBILITY: Primary | ICD-10-CM

## 2024-07-16 NOTE — TELEPHONE ENCOUNTER
Caller: [facility]    Relationship:     Best call back number: [phone]     What is the best time to reach you: 8-5 CAN LEAVE VOICEMAIL    Who are you requesting to speak with (clinical staff, provider,  specific staff member):     Do you know the name of the person who called: EAMON    What was the call regarding: [facility] IS REQUESTING THAT MEDICAL SOCIAL WORK BE ORDERED FOR PRIVATE AID CARE.    PLEASE CALL

## 2024-07-17 ENCOUNTER — HOME CARE VISIT (OUTPATIENT)
Dept: HOME HEALTH SERVICES | Facility: HOME HEALTHCARE | Age: 79
End: 2024-07-17
Payer: MEDICARE

## 2024-07-17 VITALS
SYSTOLIC BLOOD PRESSURE: 126 MMHG | HEART RATE: 75 BPM | DIASTOLIC BLOOD PRESSURE: 72 MMHG | OXYGEN SATURATION: 93 % | RESPIRATION RATE: 18 BRPM | TEMPERATURE: 97.5 F

## 2024-07-17 PROCEDURE — G0151 HHCP-SERV OF PT,EA 15 MIN: HCPCS

## 2024-07-18 ENCOUNTER — HOME CARE VISIT (OUTPATIENT)
Dept: HOME HEALTH SERVICES | Facility: HOME HEALTHCARE | Age: 79
End: 2024-07-18
Payer: MEDICARE

## 2024-07-18 PROCEDURE — G0155 HHCP-SVS OF CSW,EA 15 MIN: HCPCS

## 2024-07-18 PROCEDURE — G0152 HHCP-SERV OF OT,EA 15 MIN: HCPCS

## 2024-07-19 VITALS
TEMPERATURE: 97.8 F | RESPIRATION RATE: 19 BRPM | OXYGEN SATURATION: 93 % | HEART RATE: 82 BPM | DIASTOLIC BLOOD PRESSURE: 82 MMHG | SYSTOLIC BLOOD PRESSURE: 144 MMHG

## 2024-07-19 NOTE — HOME HEALTH
MINISTERIO vanegas on this date with patient and spouse and son. Patient tania assistance with ADLs from spouse however would benefit from having assistance with showering twice weekly. Provided options for additonal assistance. Family not interested in referral to [facility]. Family reports ablity to afford assistance. Patient has all necessary equipment and all other needs met at this time. No additional needs at this time.

## 2024-07-22 ENCOUNTER — HOME CARE VISIT (OUTPATIENT)
Dept: HOME HEALTH SERVICES | Facility: HOME HEALTHCARE | Age: 79
End: 2024-07-22
Payer: MEDICARE

## 2024-07-22 VITALS
DIASTOLIC BLOOD PRESSURE: 76 MMHG | SYSTOLIC BLOOD PRESSURE: 132 MMHG | OXYGEN SATURATION: 94 % | HEART RATE: 83 BPM | TEMPERATURE: 97.5 F | RESPIRATION RATE: 18 BRPM

## 2024-07-22 VITALS
HEART RATE: 93 BPM | TEMPERATURE: 97.8 F | DIASTOLIC BLOOD PRESSURE: 96 MMHG | OXYGEN SATURATION: 97 % | SYSTOLIC BLOOD PRESSURE: 132 MMHG

## 2024-07-22 PROCEDURE — G0152 HHCP-SERV OF OT,EA 15 MIN: HCPCS

## 2024-07-22 PROCEDURE — G0151 HHCP-SERV OF PT,EA 15 MIN: HCPCS

## 2024-07-23 ENCOUNTER — HOME CARE VISIT (OUTPATIENT)
Dept: HOME HEALTH SERVICES | Facility: HOME HEALTHCARE | Age: 79
End: 2024-07-23
Payer: MEDICARE

## 2024-07-23 VITALS
SYSTOLIC BLOOD PRESSURE: 144 MMHG | TEMPERATURE: 98 F | HEART RATE: 78 BPM | OXYGEN SATURATION: 99 % | RESPIRATION RATE: 17 BRPM | DIASTOLIC BLOOD PRESSURE: 78 MMHG

## 2024-07-23 PROCEDURE — G0152 HHCP-SERV OF OT,EA 15 MIN: HCPCS

## 2024-07-24 ENCOUNTER — HOME CARE VISIT (OUTPATIENT)
Dept: HOME HEALTH SERVICES | Facility: HOME HEALTHCARE | Age: 79
End: 2024-07-24
Payer: MEDICARE

## 2024-07-24 VITALS
HEART RATE: 74 BPM | TEMPERATURE: 97.5 F | RESPIRATION RATE: 18 BRPM | SYSTOLIC BLOOD PRESSURE: 138 MMHG | DIASTOLIC BLOOD PRESSURE: 80 MMHG | OXYGEN SATURATION: 92 %

## 2024-07-24 PROCEDURE — G0151 HHCP-SERV OF PT,EA 15 MIN: HCPCS

## 2024-07-29 ENCOUNTER — HOME CARE VISIT (OUTPATIENT)
Dept: HOME HEALTH SERVICES | Facility: HOME HEALTHCARE | Age: 79
End: 2024-07-29
Payer: MEDICARE

## 2024-07-29 VITALS
RESPIRATION RATE: 18 BRPM | TEMPERATURE: 97.3 F | OXYGEN SATURATION: 94 % | DIASTOLIC BLOOD PRESSURE: 76 MMHG | HEART RATE: 74 BPM | SYSTOLIC BLOOD PRESSURE: 132 MMHG

## 2024-07-29 VITALS
HEART RATE: 85 BPM | DIASTOLIC BLOOD PRESSURE: 76 MMHG | SYSTOLIC BLOOD PRESSURE: 132 MMHG | OXYGEN SATURATION: 93 % | TEMPERATURE: 97.7 F

## 2024-07-29 PROCEDURE — G0151 HHCP-SERV OF PT,EA 15 MIN: HCPCS

## 2024-07-29 PROCEDURE — G0152 HHCP-SERV OF OT,EA 15 MIN: HCPCS

## 2024-07-31 ENCOUNTER — HOME CARE VISIT (OUTPATIENT)
Dept: HOME HEALTH SERVICES | Facility: HOME HEALTHCARE | Age: 79
End: 2024-07-31
Payer: MEDICARE

## 2024-07-31 VITALS
TEMPERATURE: 97.5 F | OXYGEN SATURATION: 94 % | DIASTOLIC BLOOD PRESSURE: 76 MMHG | RESPIRATION RATE: 18 BRPM | SYSTOLIC BLOOD PRESSURE: 140 MMHG | HEART RATE: 79 BPM

## 2024-07-31 PROCEDURE — G0151 HHCP-SERV OF PT,EA 15 MIN: HCPCS

## 2024-08-01 ENCOUNTER — HOME CARE VISIT (OUTPATIENT)
Dept: HOME HEALTH SERVICES | Facility: HOME HEALTHCARE | Age: 79
End: 2024-08-01
Payer: MEDICARE

## 2024-08-01 VITALS
OXYGEN SATURATION: 96 % | RESPIRATION RATE: 18 BRPM | DIASTOLIC BLOOD PRESSURE: 84 MMHG | SYSTOLIC BLOOD PRESSURE: 130 MMHG | TEMPERATURE: 97.9 F | HEART RATE: 78 BPM

## 2024-08-01 PROCEDURE — G0152 HHCP-SERV OF OT,EA 15 MIN: HCPCS

## 2024-08-05 ENCOUNTER — HOME CARE VISIT (OUTPATIENT)
Dept: HOME HEALTH SERVICES | Facility: HOME HEALTHCARE | Age: 79
End: 2024-08-05
Payer: MEDICARE

## 2024-08-05 VITALS
TEMPERATURE: 97.5 F | HEART RATE: 78 BPM | OXYGEN SATURATION: 94 % | SYSTOLIC BLOOD PRESSURE: 154 MMHG | DIASTOLIC BLOOD PRESSURE: 88 MMHG | RESPIRATION RATE: 18 BRPM

## 2024-08-05 PROCEDURE — G0151 HHCP-SERV OF PT,EA 15 MIN: HCPCS

## 2024-08-06 ENCOUNTER — HOME CARE VISIT (OUTPATIENT)
Dept: HOME HEALTH SERVICES | Facility: HOME HEALTHCARE | Age: 79
End: 2024-08-06
Payer: MEDICARE

## 2024-08-06 PROCEDURE — G0152 HHCP-SERV OF OT,EA 15 MIN: HCPCS

## 2024-08-06 NOTE — TELEPHONE ENCOUNTER
Caller: Kirti Santillan    Relationship: Self    Best call back number: [phone]     Requested Prescriptions:   Requested Prescriptions     Pending Prescriptions Disp Refills    apixaban (ELIQUIS) 5 MG tablet tablet       Sig: Take 2 tablets by mouth Every 12 (Twelve) Hours for 5 days, THEN 1 tablet Every 12 (Twelve) Hours for 90 days. Indications: DVT/PE (active thrombosis)        Pharmacy where request should be sent: [facility] [id number] [facility] - [phone] [facility] [phone] FX     Last office visit with prescribing clinician: 7/10/2024   Last telemedicine visit with prescribing clinician: Visit date not found   Next office visit with prescribing clinician: 11/25/2024     Additional details provided by patient: PATIENT HAS BEEN OUT OF MEDICATION FOR A COUPLE DAYS    Does the patient have less than a 3 day supply:  [x] Yes  [] No    Would you like a call back once the refill request has been completed: [] Yes [x] No    If the office needs to give you a call back, can they leave a voicemail: [] Yes [x] No    Amy Cherry Rep   08/06/24 12:15 EDT

## 2024-08-07 ENCOUNTER — HOME CARE VISIT (OUTPATIENT)
Dept: HOME HEALTH SERVICES | Facility: HOME HEALTHCARE | Age: 79
End: 2024-08-07
Payer: MEDICARE

## 2024-08-07 ENCOUNTER — TELEPHONE (OUTPATIENT)
Dept: INTERNAL MEDICINE | Facility: CLINIC | Age: 79
End: 2024-08-07
Payer: MEDICARE

## 2024-08-07 VITALS
TEMPERATURE: 97.9 F | OXYGEN SATURATION: 93 % | SYSTOLIC BLOOD PRESSURE: 154 MMHG | HEART RATE: 78 BPM | DIASTOLIC BLOOD PRESSURE: 82 MMHG | RESPIRATION RATE: 18 BRPM

## 2024-08-07 VITALS
RESPIRATION RATE: 18 BRPM | OXYGEN SATURATION: 93 % | SYSTOLIC BLOOD PRESSURE: 140 MMHG | HEART RATE: 79 BPM | DIASTOLIC BLOOD PRESSURE: 80 MMHG | TEMPERATURE: 97.5 F

## 2024-08-07 PROCEDURE — G0151 HHCP-SERV OF PT,EA 15 MIN: HCPCS

## 2024-08-07 NOTE — TELEPHONE ENCOUNTER
EAMON WITH [facility] CALLED FOR CONTINUING VERBAL ORDERS FOR 2 TIMES A WEEK FOR 4 WEEKS. SHE IS STILL HAVING A HARD TIME WALKING    WANTS TO KNOW NEUROLOGY REFERRAL HAS BEEN EVER BEEN ORDERED  DUE TO FREEZING WHILE WALKING WITH LEFT FOOT NOT ABLE TO TURN AND UNABLE TO     PLEASE CALL [phone]

## 2024-08-07 NOTE — TELEPHONE ENCOUNTER
Yes okay with verbal. I do not believe we discussed neurology as I'm unsure if it is a specific neurological deficit or issue. If this is a new symptom, I may need to see her in office to see if neurology or someone else needs to see her

## 2024-08-08 ENCOUNTER — HOME CARE VISIT (OUTPATIENT)
Dept: HOME HEALTH SERVICES | Facility: HOME HEALTHCARE | Age: 79
End: 2024-08-08
Payer: MEDICARE

## 2024-08-08 PROCEDURE — G0152 HHCP-SERV OF OT,EA 15 MIN: HCPCS

## 2024-08-09 VITALS
SYSTOLIC BLOOD PRESSURE: 142 MMHG | TEMPERATURE: 98 F | DIASTOLIC BLOOD PRESSURE: 76 MMHG | HEART RATE: 81 BPM | OXYGEN SATURATION: 96 %

## 2024-08-09 NOTE — TELEPHONE ENCOUNTER
Contacted patient to see if this was a new symptom patient was sleeping will call back Friday to speak with patient

## 2024-08-12 ENCOUNTER — HOME CARE VISIT (OUTPATIENT)
Dept: HOME HEALTH SERVICES | Facility: HOME HEALTHCARE | Age: 79
End: 2024-08-12
Payer: MEDICARE

## 2024-08-12 VITALS
RESPIRATION RATE: 18 BRPM | SYSTOLIC BLOOD PRESSURE: 138 MMHG | OXYGEN SATURATION: 94 % | HEART RATE: 76 BPM | DIASTOLIC BLOOD PRESSURE: 76 MMHG | TEMPERATURE: 97.7 F

## 2024-08-12 PROCEDURE — G0151 HHCP-SERV OF PT,EA 15 MIN: HCPCS

## 2024-08-13 ENCOUNTER — HOME CARE VISIT (OUTPATIENT)
Dept: HOME HEALTH SERVICES | Facility: HOME HEALTHCARE | Age: 79
End: 2024-08-13
Payer: MEDICARE

## 2024-08-13 VITALS
DIASTOLIC BLOOD PRESSURE: 80 MMHG | SYSTOLIC BLOOD PRESSURE: 138 MMHG | HEART RATE: 73 BPM | TEMPERATURE: 98 F | OXYGEN SATURATION: 95 %

## 2024-08-13 PROCEDURE — G0152 HHCP-SERV OF OT,EA 15 MIN: HCPCS

## 2024-08-14 ENCOUNTER — HOME CARE VISIT (OUTPATIENT)
Dept: HOME HEALTH SERVICES | Facility: HOME HEALTHCARE | Age: 79
End: 2024-08-14
Payer: MEDICARE

## 2024-08-14 VITALS
OXYGEN SATURATION: 93 % | HEART RATE: 78 BPM | DIASTOLIC BLOOD PRESSURE: 80 MMHG | SYSTOLIC BLOOD PRESSURE: 144 MMHG | TEMPERATURE: 97.5 F | RESPIRATION RATE: 18 BRPM

## 2024-08-14 PROCEDURE — G0151 HHCP-SERV OF PT,EA 15 MIN: HCPCS

## 2024-08-15 ENCOUNTER — HOME CARE VISIT (OUTPATIENT)
Dept: HOME HEALTH SERVICES | Facility: HOME HEALTHCARE | Age: 79
End: 2024-08-15
Payer: MEDICARE

## 2024-08-15 VITALS
OXYGEN SATURATION: 97 % | SYSTOLIC BLOOD PRESSURE: 130 MMHG | HEART RATE: 87 BPM | DIASTOLIC BLOOD PRESSURE: 84 MMHG | TEMPERATURE: 97.9 F

## 2024-08-15 PROCEDURE — G0152 HHCP-SERV OF OT,EA 15 MIN: HCPCS

## 2024-08-19 ENCOUNTER — HOME CARE VISIT (OUTPATIENT)
Dept: HOME HEALTH SERVICES | Facility: HOME HEALTHCARE | Age: 79
End: 2024-08-19
Payer: MEDICARE

## 2024-08-19 VITALS
OXYGEN SATURATION: 93 % | TEMPERATURE: 97.3 F | HEART RATE: 73 BPM | DIASTOLIC BLOOD PRESSURE: 80 MMHG | RESPIRATION RATE: 18 BRPM | SYSTOLIC BLOOD PRESSURE: 144 MMHG

## 2024-08-19 PROCEDURE — G0151 HHCP-SERV OF PT,EA 15 MIN: HCPCS

## 2024-08-20 ENCOUNTER — HOME CARE VISIT (OUTPATIENT)
Dept: HOME HEALTH SERVICES | Facility: HOME HEALTHCARE | Age: 79
End: 2024-08-20
Payer: MEDICARE

## 2024-08-20 VITALS
SYSTOLIC BLOOD PRESSURE: 138 MMHG | RESPIRATION RATE: 18 BRPM | TEMPERATURE: 98.2 F | DIASTOLIC BLOOD PRESSURE: 78 MMHG | HEART RATE: 77 BPM | OXYGEN SATURATION: 97 %

## 2024-08-20 PROCEDURE — G0152 HHCP-SERV OF OT,EA 15 MIN: HCPCS

## 2024-08-21 ENCOUNTER — HOME CARE VISIT (OUTPATIENT)
Dept: HOME HEALTH SERVICES | Facility: HOME HEALTHCARE | Age: 79
End: 2024-08-21
Payer: MEDICARE

## 2024-08-21 VITALS
RESPIRATION RATE: 18 BRPM | DIASTOLIC BLOOD PRESSURE: 80 MMHG | HEART RATE: 74 BPM | OXYGEN SATURATION: 94 % | TEMPERATURE: 97 F | SYSTOLIC BLOOD PRESSURE: 136 MMHG

## 2024-08-21 PROCEDURE — G0151 HHCP-SERV OF PT,EA 15 MIN: HCPCS

## 2024-08-22 ENCOUNTER — HOME CARE VISIT (OUTPATIENT)
Dept: HOME HEALTH SERVICES | Facility: HOME HEALTHCARE | Age: 79
End: 2024-08-22
Payer: MEDICARE

## 2024-08-22 PROCEDURE — G0152 HHCP-SERV OF OT,EA 15 MIN: HCPCS

## 2024-08-24 VITALS
SYSTOLIC BLOOD PRESSURE: 144 MMHG | HEART RATE: 76 BPM | OXYGEN SATURATION: 92 % | TEMPERATURE: 98.1 F | DIASTOLIC BLOOD PRESSURE: 82 MMHG

## 2024-08-26 ENCOUNTER — HOME CARE VISIT (OUTPATIENT)
Dept: HOME HEALTH SERVICES | Facility: HOME HEALTHCARE | Age: 79
End: 2024-08-26
Payer: MEDICARE

## 2024-08-26 VITALS
HEART RATE: 68 BPM | TEMPERATURE: 97.5 F | RESPIRATION RATE: 18 BRPM | SYSTOLIC BLOOD PRESSURE: 150 MMHG | DIASTOLIC BLOOD PRESSURE: 80 MMHG | OXYGEN SATURATION: 94 %

## 2024-08-26 PROCEDURE — G0151 HHCP-SERV OF PT,EA 15 MIN: HCPCS

## 2024-08-27 ENCOUNTER — HOME CARE VISIT (OUTPATIENT)
Dept: HOME HEALTH SERVICES | Facility: HOME HEALTHCARE | Age: 79
End: 2024-08-27
Payer: MEDICARE

## 2024-08-27 VITALS
SYSTOLIC BLOOD PRESSURE: 122 MMHG | RESPIRATION RATE: 18 BRPM | DIASTOLIC BLOOD PRESSURE: 80 MMHG | HEART RATE: 71 BPM | OXYGEN SATURATION: 97 % | TEMPERATURE: 98 F

## 2024-08-27 PROCEDURE — G0152 HHCP-SERV OF OT,EA 15 MIN: HCPCS

## 2024-08-28 ENCOUNTER — HOME CARE VISIT (OUTPATIENT)
Dept: HOME HEALTH SERVICES | Facility: HOME HEALTHCARE | Age: 79
End: 2024-08-28
Payer: MEDICARE

## 2024-08-28 VITALS
RESPIRATION RATE: 18 BRPM | TEMPERATURE: 97.7 F | SYSTOLIC BLOOD PRESSURE: 130 MMHG | HEART RATE: 74 BPM | OXYGEN SATURATION: 94 % | DIASTOLIC BLOOD PRESSURE: 80 MMHG

## 2024-08-28 PROCEDURE — G0151 HHCP-SERV OF PT,EA 15 MIN: HCPCS

## 2024-08-29 ENCOUNTER — HOME CARE VISIT (OUTPATIENT)
Dept: HOME HEALTH SERVICES | Facility: HOME HEALTHCARE | Age: 79
End: 2024-08-29
Payer: MEDICARE

## 2024-08-29 VITALS
DIASTOLIC BLOOD PRESSURE: 70 MMHG | OXYGEN SATURATION: 97 % | SYSTOLIC BLOOD PRESSURE: 128 MMHG | HEART RATE: 87 BPM | TEMPERATURE: 97.8 F

## 2024-08-29 PROCEDURE — G0152 HHCP-SERV OF OT,EA 15 MIN: HCPCS

## 2024-09-02 DIAGNOSIS — R29.818 PARKINSONIAN FEATURES: Primary | ICD-10-CM

## 2024-09-02 DIAGNOSIS — R26.89 SHUFFLING GAIT: ICD-10-CM

## 2024-09-03 ENCOUNTER — HOME CARE VISIT (OUTPATIENT)
Dept: HOME HEALTH SERVICES | Facility: HOME HEALTHCARE | Age: 79
End: 2024-09-03
Payer: MEDICARE

## 2024-09-03 VITALS
DIASTOLIC BLOOD PRESSURE: 80 MMHG | OXYGEN SATURATION: 93 % | RESPIRATION RATE: 18 BRPM | TEMPERATURE: 97.3 F | HEART RATE: 80 BPM | SYSTOLIC BLOOD PRESSURE: 146 MMHG

## 2024-09-03 PROCEDURE — G0151 HHCP-SERV OF PT,EA 15 MIN: HCPCS

## 2024-09-04 ENCOUNTER — HOME CARE VISIT (OUTPATIENT)
Dept: HOME HEALTH SERVICES | Facility: HOME HEALTHCARE | Age: 79
End: 2024-09-04
Payer: MEDICARE

## 2024-09-04 ENCOUNTER — TELEPHONE (OUTPATIENT)
Dept: INTERNAL MEDICINE | Facility: CLINIC | Age: 79
End: 2024-09-04
Payer: MEDICARE

## 2024-09-04 VITALS
SYSTOLIC BLOOD PRESSURE: 136 MMHG | HEART RATE: 80 BPM | DIASTOLIC BLOOD PRESSURE: 76 MMHG | OXYGEN SATURATION: 94 % | RESPIRATION RATE: 18 BRPM | TEMPERATURE: 97.3 F

## 2024-09-04 DIAGNOSIS — M54.16 LUMBAR RADICULOPATHY: ICD-10-CM

## 2024-09-04 PROCEDURE — G0151 HHCP-SERV OF PT,EA 15 MIN: HCPCS

## 2024-09-04 RX ORDER — GABAPENTIN 300 MG/1
300 CAPSULE ORAL 3 TIMES DAILY
Qty: 90 CAPSULE | Refills: 0 | Status: SHIPPED | OUTPATIENT
Start: 2024-09-04

## 2024-09-04 NOTE — TELEPHONE ENCOUNTER
Attempted to contact patient regarding referral to neuro unable to contact patient after several attempted calls, mailed letter [phone]

## 2024-09-04 NOTE — HOME HEALTH
"Subjective: \"I hope I can see neurolgy soon, because I know I have something that keeps me from walking/moving my legs especially the left one.\"    Falls reported: none    Medication changes: none"

## 2024-09-05 ENCOUNTER — HOME CARE VISIT (OUTPATIENT)
Dept: HOME HEALTH SERVICES | Facility: HOME HEALTHCARE | Age: 79
End: 2024-09-05
Payer: MEDICARE

## 2024-09-05 PROCEDURE — G0152 HHCP-SERV OF OT,EA 15 MIN: HCPCS

## 2024-09-05 NOTE — Clinical Note
Discharge Decision:  Pt is d/c'd from  09-05-24 w/a plateau in progress, but also improved independence and safety in her mobilities and ADLs.    Ongoing needs: She is being referred to Neurologist for evaluation as pt presents to have many characteristics similiar to a person w/a neuromotor condition (i.e.; P.D.)    Summary: She was active w/HH PT and OT (and 1 visit from [facility]) from 07-15-24 to 09-05-24. Her diagnosis and focus of care was: Lumbar stenosis with LLE radiculopathy with increased weakness due to immobility from LLE DVT. She progressed in her mobilities, transfers, and ADL safety. Overall, she will continue to need assistance when showering and the transfers in/out of shower. She v/u and stated her spouse will be the person to assist her even though they do not get along very well. She continues to use the BSC next to her lift chair and sleeps in the recliner as well. PT reports pt in need of SBA w/rollator use in the home.  Thank you for the referral.

## 2024-09-06 VITALS
TEMPERATURE: 98 F | DIASTOLIC BLOOD PRESSURE: 88 MMHG | RESPIRATION RATE: 18 BRPM | OXYGEN SATURATION: 96 % | SYSTOLIC BLOOD PRESSURE: 148 MMHG | HEART RATE: 75 BPM

## 2024-09-06 NOTE — HOME HEALTH
"SUBJECTIVE: \"All I do is pee. This water pill is working, but my leg is still swollen.\"  Pt/CG are aware that today is agency d/c from home health. She knows she can have HH again in the future if needed as long as the criteria for admission is met. She v/u."

## 2024-09-24 ENCOUNTER — OFFICE VISIT (OUTPATIENT)
Dept: NEUROLOGY | Facility: CLINIC | Age: 79
End: 2024-09-24
Payer: MEDICARE

## 2024-09-24 VITALS — SYSTOLIC BLOOD PRESSURE: 138 MMHG | HEART RATE: 83 BPM | DIASTOLIC BLOOD PRESSURE: 82 MMHG | OXYGEN SATURATION: 98 %

## 2024-09-24 DIAGNOSIS — M48.061 SPINAL STENOSIS OF LUMBAR REGION, UNSPECIFIED WHETHER NEUROGENIC CLAUDICATION PRESENT: ICD-10-CM

## 2024-09-24 DIAGNOSIS — R26.9 GAIT DISTURBANCE: Primary | ICD-10-CM

## 2024-09-24 DIAGNOSIS — G56.02 CARPAL TUNNEL SYNDROME OF LEFT WRIST: ICD-10-CM

## 2024-09-24 DIAGNOSIS — R29.818 PARKINSONIAN FEATURES: ICD-10-CM

## 2024-09-24 RX ORDER — CARBIDOPA AND LEVODOPA 25; 100 MG/1; MG/1
TABLET ORAL
Qty: 90 TABLET | Refills: 0 | Status: SHIPPED | OUTPATIENT
Start: 2024-09-24

## 2024-10-16 DIAGNOSIS — M54.16 LUMBAR RADICULOPATHY: ICD-10-CM

## 2024-10-16 RX ORDER — GABAPENTIN 300 MG/1
300 CAPSULE ORAL 3 TIMES DAILY
Qty: 90 CAPSULE | Refills: 0 | Status: SHIPPED | OUTPATIENT
Start: 2024-10-16

## 2024-10-29 ENCOUNTER — OFFICE VISIT (OUTPATIENT)
Dept: CARDIOLOGY | Facility: CLINIC | Age: 79
End: 2024-10-29
Payer: MEDICARE

## 2024-10-29 ENCOUNTER — HOSPITAL ENCOUNTER (OUTPATIENT)
Dept: CARDIOLOGY | Facility: HOSPITAL | Age: 79
Discharge: HOME | End: 2024-10-29
Admitting: INTERNAL MEDICINE
Payer: MEDICARE

## 2024-10-29 VITALS
DIASTOLIC BLOOD PRESSURE: 80 MMHG | BODY MASS INDEX: 29.23 KG/M2 | OXYGEN SATURATION: 95 % | HEART RATE: 88 BPM | SYSTOLIC BLOOD PRESSURE: 126 MMHG | WEIGHT: 165 LBS | HEIGHT: 63 IN

## 2024-10-29 DIAGNOSIS — G89.29 CHRONIC LOW BACK PAIN, UNSPECIFIED BACK PAIN LATERALITY, UNSPECIFIED WHETHER SCIATICA PRESENT: ICD-10-CM

## 2024-10-29 DIAGNOSIS — R60.0 BILATERAL LEG EDEMA: ICD-10-CM

## 2024-10-29 DIAGNOSIS — M48.062 LUMBAR STENOSIS WITH NEUROGENIC CLAUDICATION: ICD-10-CM

## 2024-10-29 DIAGNOSIS — R94.39 ABNORMAL NUCLEAR STRESS TEST: ICD-10-CM

## 2024-10-29 DIAGNOSIS — I42.8 OTHER CARDIOMYOPATHY: ICD-10-CM

## 2024-10-29 DIAGNOSIS — E11.59 TYPE 2 DIABETES MELLITUS WITH OTHER CIRCULATORY COMPLICATION, WITHOUT LONG-TERM CURRENT USE OF INSULIN: ICD-10-CM

## 2024-10-29 DIAGNOSIS — I82.502 CHRONIC DEEP VEIN THROMBOSIS (DVT) OF LEFT LOWER EXTREMITY, UNSPECIFIED VEIN: ICD-10-CM

## 2024-10-29 DIAGNOSIS — I10 PRIMARY HYPERTENSION: ICD-10-CM

## 2024-10-29 DIAGNOSIS — M54.50 CHRONIC LOW BACK PAIN, UNSPECIFIED BACK PAIN LATERALITY, UNSPECIFIED WHETHER SCIATICA PRESENT: ICD-10-CM

## 2024-10-29 DIAGNOSIS — I71.40 ABDOMINAL ANEURYSM: ICD-10-CM

## 2024-10-29 DIAGNOSIS — I71.40 ABDOMINAL ANEURYSM: Primary | ICD-10-CM

## 2024-10-29 LAB
ANION GAP SERPL CALCULATED.3IONS-SCNC: 7.3 MMOL/L (ref 5–15)
BUN SERPL-MCNC: 9 MG/DL (ref 8–23)
BUN/CREAT SERPL: 13.8 (ref 7–25)
CALCIUM SPEC-SCNC: 11.1 MG/DL (ref 8.6–10.5)
CHLORIDE SERPL-SCNC: 102 MMOL/L (ref 98–107)
CHOLEST SERPL-MCNC: 186 MG/DL (ref 0–200)
CO2 SERPL-SCNC: 29.7 MMOL/L (ref 22–29)
CREAT SERPL-MCNC: 0.65 MG/DL (ref 0.57–1)
DEPRECATED RDW RBC AUTO: 44.1 FL (ref 37–54)
EGFRCR SERPLBLD CKD-EPI 2021: 89.7 ML/MIN/1.73
ERYTHROCYTE [DISTWIDTH] IN BLOOD BY AUTOMATED COUNT: 13.6 % (ref 12.3–15.4)
GLUCOSE SERPL-MCNC: 133 MG/DL (ref 65–99)
HBA1C MFR BLD: 6 % (ref 4.8–5.6)
HCT VFR BLD AUTO: 44.6 % (ref 34–46.6)
HDLC SERPL-MCNC: 52 MG/DL (ref 40–60)
HGB BLD-MCNC: 14.4 G/DL (ref 12–15.9)
LDLC SERPL CALC-MCNC: 112 MG/DL (ref 0–100)
LDLC/HDLC SERPL: 2.09 {RATIO}
MCH RBC QN AUTO: 28.6 PG (ref 26.6–33)
MCHC RBC AUTO-ENTMCNC: 32.3 G/DL (ref 31.5–35.7)
MCV RBC AUTO: 88.5 FL (ref 79–97)
NT-PROBNP SERPL-MCNC: 127 PG/ML (ref 0–1800)
PLATELET # BLD AUTO: 252 10*3/MM3 (ref 140–450)
PMV BLD AUTO: 11.4 FL (ref 6–12)
POTASSIUM SERPL-SCNC: 4.3 MMOL/L (ref 3.5–5.2)
RBC # BLD AUTO: 5.04 10*6/MM3 (ref 3.77–5.28)
SODIUM SERPL-SCNC: 139 MMOL/L (ref 136–145)
TRIGL SERPL-MCNC: 126 MG/DL (ref 0–150)
VLDLC SERPL-MCNC: 22 MG/DL (ref 5–40)
WBC NRBC COR # BLD AUTO: 8.53 10*3/MM3 (ref 3.4–10.8)

## 2024-10-29 PROCEDURE — 3079F DIAST BP 80-89 MM HG: CPT | Performed by: INTERNAL MEDICINE

## 2024-10-29 PROCEDURE — 83880 ASSAY OF NATRIURETIC PEPTIDE: CPT | Performed by: INTERNAL MEDICINE

## 2024-10-29 PROCEDURE — 85027 COMPLETE CBC AUTOMATED: CPT | Performed by: INTERNAL MEDICINE

## 2024-10-29 PROCEDURE — 80048 BASIC METABOLIC PNL TOTAL CA: CPT | Performed by: INTERNAL MEDICINE

## 2024-10-29 PROCEDURE — 80061 LIPID PANEL: CPT | Performed by: INTERNAL MEDICINE

## 2024-10-29 PROCEDURE — 3074F SYST BP LT 130 MM HG: CPT | Performed by: INTERNAL MEDICINE

## 2024-10-29 PROCEDURE — 1159F MED LIST DOCD IN RCRD: CPT | Performed by: INTERNAL MEDICINE

## 2024-10-29 PROCEDURE — 83695 ASSAY OF LIPOPROTEIN(A): CPT | Performed by: INTERNAL MEDICINE

## 2024-10-29 PROCEDURE — G2211 COMPLEX E/M VISIT ADD ON: HCPCS | Performed by: INTERNAL MEDICINE

## 2024-10-29 PROCEDURE — 36415 COLL VENOUS BLD VENIPUNCTURE: CPT

## 2024-10-29 PROCEDURE — 1160F RVW MEDS BY RX/DR IN RCRD: CPT | Performed by: INTERNAL MEDICINE

## 2024-10-29 PROCEDURE — 83036 HEMOGLOBIN GLYCOSYLATED A1C: CPT | Performed by: INTERNAL MEDICINE

## 2024-10-29 PROCEDURE — 99214 OFFICE O/P EST MOD 30 MIN: CPT | Performed by: INTERNAL MEDICINE

## 2024-10-29 NOTE — PROGRESS NOTES
CARDIOLOGY    Tang Cyr MD    ENCOUNTER DATE:  10/29/24    Kirti Santillan / 78 y.o. / female        CHIEF COMPLAINT / REASON FOR OFFICE VISIT     Heart Problem      HISTORY OF PRESENT ILLNESS       HPI    Kirti Santillan is a 78 y.o. female with hypertension, spinal stenosis, overweight who presents to establish care for abnormal nuclear med stress.    Nuclear SPECT earlier this year 4/2024 showed a moderate-sized reversible perfusion defect in the inferior and inferolateral wall.  She establish care in cardiology clinic 4/2024.  Her chest pain had resolved at that time.  She was otherwise nonambulatory and wheelchair dependent.  Left heart catheterization was discussed and patient was not interested especially given poor functional status.  Medical therapy was pursued, and low-dose aspirin as well as atorvastatin 10 were started at that time.  She was also diagnosed on venous duplex in 6/2024 and started on Eliquis 5 bid.    Accompanied by  at the visit today who contributed to medical history. Today, patient has no acute complaints. No recurrent chest pain. Still non-ambulatory and  pushes her on a wheeled walker. Chronic foot swelling which is stable. Denies dyspnea on exertion, palpitation, orthopnea, PND, dizziness, syncope, bleeding, or unexpected falls. Does not recall having had a heart attack or stroke. Former smoker, 1 ppd x 30 years and quit around age 49. Denies alcohol or substance.    REVIEW OF SYSTEMS     Review of Systems   Constitutional: Negative for weight loss.   Cardiovascular:  Positive for leg swelling. Negative for chest pain, dyspnea on exertion, orthopnea, palpitations, paroxysmal nocturnal dyspnea and syncope.   Respiratory:  Negative for shortness of breath.    Hematologic/Lymphatic: Negative for bleeding problem.   Musculoskeletal:  Negative for falls.   Gastrointestinal:  Negative for hematochezia and melena.   Genitourinary:  Negative for hematuria.  "  Neurological:  Positive for loss of balance and weakness. Negative for dizziness and light-headedness.   Psychiatric/Behavioral:  Negative for altered mental status.            MEDICATIONS      Outpatient Encounter Medications as of 10/29/2024   Medication Sig Dispense Refill    apixaban (ELIQUIS) 5 MG tablet tablet Take 1 tablet by mouth Every 12 (Twelve) Hours. Indications: DVT/PE (active thrombosis) 60 tablet 2    atorvastatin (LIPITOR) 10 MG tablet Take 1 tablet by mouth Daily. Indications: High Amount of Fats in the Blood      carbidopa-levodopa (SINEMET)  MG per tablet Take one daily 30 minutes minutes before meals for two weeks, then increase to twice daily 90 tablet 0    furosemide (LASIX) 20 MG tablet Take 1 tablet by mouth Daily. (Patient taking differently: Take 1 tablet by mouth Daily.) 30 tablet 0    gabapentin (NEURONTIN) 300 MG capsule TAKE 1 CAPSULE BY MOUTH 3 TIMES A DAY 90 capsule 0    [DISCONTINUED] ASPIRIN 81 PO Take 81 mg by mouth Daily. Indications: coronary artery disease       No facility-administered encounter medications on file as of 10/29/2024.         VITAL SIGNS     Visit Vitals  /80 (BP Location: Left arm, Patient Position: Sitting)   Pulse 88   Ht 160 cm (63\")   Wt 74.8 kg (165 lb)   LMP  (LMP Unknown)   SpO2 95%   BMI 29.23 kg/m²         Wt Readings from Last 3 Encounters:   10/29/24 74.8 kg (165 lb)   07/10/24 75.8 kg (167 lb)   06/17/24 75.8 kg (167 lb)     Body mass index is 29.23 kg/m².      PHYSICAL EXAMINATION     Vitals and nursing note reviewed.   Constitutional:       Appearance: Not in distress. Frail. Chronically ill-appearing.      Comments: Morbidly obese   Neck:      Comments: JVD difficult to assess due to body habitus  Pulmonary:      Effort: Pulmonary effort is normal.   Cardiovascular:      Normal rate. Regular rhythm.      Murmurs: There is no murmur.   Edema:     Peripheral edema present.     Thigh: bilateral 2+ edema of the thigh.  Abdominal:      " General: There is distension.      Palpations: Abdomen is soft.      Tenderness: There is no abdominal tenderness.   Skin:     General: Skin is cool.   Neurological:      Mental Status: Alert and oriented to person, place and time.           REVIEWED DATA     Procedures    Lab Results   Component Value Date    WBC 7.73 06/06/2024    HGB 11.7 (L) 06/06/2024    HCT 36.6 06/06/2024    MCV 85.5 06/06/2024     06/06/2024       Lab Results   Component Value Date    HGBA1C 6.50 (H) 02/21/2024       Lab Results   Component Value Date    GLUCOSE 116 (H) 06/06/2024    BUN 10 06/06/2024    CREATININE 0.87 06/06/2024    EGFRRESULT 71.8 06/04/2024    EGFR 67.9 06/06/2024    BCR 11.5 06/06/2024    K 4.2 06/06/2024    CO2 27.1 06/06/2024    CALCIUM 9.8 06/06/2024    PROTENTOTREF 6.3 06/04/2024    ALBUMIN 3.4 (L) 06/04/2024    BILITOT 0.4 06/04/2024    AST 6 06/04/2024    ALT 7 06/04/2024       Lab Results   Component Value Date    CHLPL 189 11/21/2023    TRIG 195 (H) 11/21/2023    HDL 46 11/21/2023     (H) 11/21/2023             ASSESSMENT & PLAN     Diagnoses and all orders for this visit:    1. Abdominal aneurysm (Primary)  -     Adult Transthoracic Echo Complete w/ Color, Spectral and Contrast if Necessary Per Protocol; Future  -     Lipid Panel; Future  -     Basic Metabolic Panel; Future  -     proBNP; Future  -     CBC (No Diff); Future    2. Primary hypertension  -     Adult Transthoracic Echo Complete w/ Color, Spectral and Contrast if Necessary Per Protocol; Future  -     Lipid Panel; Future  -     Lipoprotein A (LPA); Future  -     Basic Metabolic Panel; Future  -     proBNP; Future  -     CBC (No Diff); Future    3. Chronic deep vein thrombosis (DVT) of left lower extremity, unspecified vein  -     Adult Transthoracic Echo Complete w/ Color, Spectral and Contrast if Necessary Per Protocol; Future  -     Lipid Panel; Future  -     Basic Metabolic Panel; Future  -     proBNP; Future  -     CBC (No Diff);  Future    4. Chronic low back pain, unspecified back pain laterality, unspecified whether sciatica present  -     Basic Metabolic Panel; Future  -     proBNP; Future  -     CBC (No Diff); Future    5. Lumbar stenosis with neurogenic claudication    6. Abnormal nuclear stress test  -     Adult Transthoracic Echo Complete w/ Color, Spectral and Contrast if Necessary Per Protocol; Future  -     Lipid Panel; Future  -     Lipoprotein A (LPA); Future    7. Bilateral leg edema  -     Adult Transthoracic Echo Complete w/ Color, Spectral and Contrast if Necessary Per Protocol; Future  -     Lipid Panel; Future  -     Basic Metabolic Panel; Future  -     proBNP; Future  -     CBC (No Diff); Future    8. Other cardiomyopathy  -     proBNP; Future      Abnormal nuclear med stress: Nuclear SPECT earlier this year 4/2024 showed a moderate-sized reversible perfusion defect in the inferior and inferolateral wall.  Patient reported atypical chest pain that felt like muscle pull, which had completely resolved and not recurred.  We discussed left heart catheterization and she remains uninterested.  We will defer invasive cath indefinitely at this time given resolution of symptoms and significant frailty predisposing to fall and bleeding.  Focus on risk factor modifications, see below.  Leg swelling: Significant bilateral leg edema.  This may in large part be due to DVT, see below.  Previously on Lasix 20 daily but pt is out.  No echo on file, we will arrange for outpatient echo to make sure there is no underlying cardiomyopathy leading to heart failure symptoms.  Will also check BMET along with proBNP today.  Hypertension: In office /80, well-controlled.  Previously on amlodipine 5 daily which was discontinued when she left the nursing home.  Defer therapy reinitiation given high fall risk.  Hyperlipidemia: Lipid panel 1 year ago in 11/2023 showed HDL 46, .  10-year ASCVD risk 54%.  CTA abdomen pelvis in 10/2023  independently reviewed by me, which showed significant atherosclerotic changes in the abdominal aorta associated with focal aneurysm measuring 4.1cm x 4.3cm, although the proximal coronary arteries appeared open even though it was non-gated.  She was started on atorvastatin 10 daily and tolerating well, will repeat lipid panel today along with LP(a) to help guide the need for further therapy adjustment.  DVT: Diagnosed on venous duplex in 6/2024. On Eliquis 5 bid, will discontinue low-dose aspirin to reduce bleeding risk and also check CBC with blood draw today.  Prediabetes: Hemoglobin A1c 6.5 in 2/2024. Management per primary team and other specialists, will repeat today.  Overweight: BMI 28.  Spinal stenosis: Again strongly emphasized fall precaution.    I spent 31 minutes caring for Kirti on this date of service. This time includes time spent by me in the following activities: preparing for the visit, reviewing tests, obtaining and/or reviewing a separately obtained history, performing a medically appropriate examination and/or evaluation, counseling and educating the patient/family/caregiver, and ordering medications, tests, or procedures.     Additionally, I am providing longitudinal care which includes continuously being a focal point for all of the patient's health care services and ongoing medical care related to hyperlipidemia as documented above.    Tang Cyr MD. PhD. FACC  10/29/24  13:40 EDT    Part of this note may be an electronic transcription/translation of spoken language to printed text using the Dragon dictation system.

## 2024-10-30 ENCOUNTER — TELEPHONE (OUTPATIENT)
Dept: CARDIOLOGY | Facility: CLINIC | Age: 79
End: 2024-10-30
Payer: MEDICARE

## 2024-10-30 LAB — LPA SERPL-SCNC: 115.8 NMOL/L

## 2024-10-30 RX ORDER — ATORVASTATIN CALCIUM 20 MG/1
20 TABLET, FILM COATED ORAL DAILY
Qty: 90 TABLET | Refills: 3 | Status: SHIPPED | OUTPATIENT
Start: 2024-10-30

## 2024-10-30 NOTE — TELEPHONE ENCOUNTER
----- Message from Tang Cyr sent at 10/29/2024  4:26 PM EDT -----  Lipids remain suboptimal, A1c improved from prior, the rest of the labs overall reassuring.  Please increase atorvastatin to 20 daily, also CCing PCP Dr. Sprague as update.

## 2024-10-30 NOTE — TELEPHONE ENCOUNTER
Pt returned call.  I spoke with Kirti Santillan and updated pt on results/recommendations from provider.  Pt verbalized understanding and has no further questions at this time.    Thank you,    Yandy HICKS, RN  Triage [facility]  10/30/24 12:51 EDT

## 2024-10-30 NOTE — TELEPHONE ENCOUNTER
Called and left VM, will continue to try to reach pt.    HUB- please put patient straight through to triage    Triage- please review both messages from Dr. Klarissa Rucker, RN  Triage RN  10/30/24 12:20 EDT

## 2024-10-30 NOTE — TELEPHONE ENCOUNTER
----- Message from Tang Cyr sent at 10/30/2024 12:16 PM EDT -----  LP(a) very elevated, please increase atorvastatin to 20 daily as outlined in my result note from yesterday.

## 2024-10-30 NOTE — TELEPHONE ENCOUNTER
Called and left VM, will continue to try to reach pt.    HUB- please put patient straight through to triage    Deandra Rucker, RN  Triage RN  10/30/24 09:07 EDT

## 2024-10-31 NOTE — TELEPHONE ENCOUNTER
Called and left VM, will continue to try to reach pt.    HUB- please put patient straight through to triage    Deandra Rucker, RN  Triage RN  10/31/24 09:12 EDT

## 2024-11-01 NOTE — TELEPHONE ENCOUNTER
Reviewed recommendations with patient, verbalized understanding, will call with any further questions or complaints.  Pt states that she will double up on 10mg tablets for now, but aware that when she picks up new atorvastatin prescription, it will be 20mg tablets.    Daendra Rucker RN  Triage Nurse  11/01/24 09:33 EDT

## 2024-11-15 ENCOUNTER — HOSPITAL ENCOUNTER (OUTPATIENT)
Dept: CARDIOLOGY | Facility: HOSPITAL | Age: 79
Discharge: HOME | End: 2024-11-15
Payer: MEDICARE

## 2024-11-15 VITALS
BODY MASS INDEX: 29.23 KG/M2 | HEIGHT: 63 IN | WEIGHT: 165 LBS | SYSTOLIC BLOOD PRESSURE: 130 MMHG | HEART RATE: 78 BPM | DIASTOLIC BLOOD PRESSURE: 90 MMHG

## 2024-11-15 DIAGNOSIS — R94.39 ABNORMAL NUCLEAR STRESS TEST: ICD-10-CM

## 2024-11-15 DIAGNOSIS — I71.40 ABDOMINAL ANEURYSM: ICD-10-CM

## 2024-11-15 DIAGNOSIS — I82.502 CHRONIC DEEP VEIN THROMBOSIS (DVT) OF LEFT LOWER EXTREMITY, UNSPECIFIED VEIN: ICD-10-CM

## 2024-11-15 DIAGNOSIS — R60.0 BILATERAL LEG EDEMA: ICD-10-CM

## 2024-11-15 DIAGNOSIS — I10 PRIMARY HYPERTENSION: ICD-10-CM

## 2024-11-15 LAB
AORTIC ARCH: 2.6 CM
ASCENDING AORTA: 3.2 CM
BH CV ECHO MEAS - ACS: 1.76 CM
BH CV ECHO MEAS - AO MAX PG: 8.5 MMHG
BH CV ECHO MEAS - AO MEAN PG: 5 MMHG
BH CV ECHO MEAS - AO ROOT DIAM: 3.4 CM
BH CV ECHO MEAS - AO V2 MAX: 146 CM/SEC
BH CV ECHO MEAS - AO V2 VTI: 31.2 CM
BH CV ECHO MEAS - AVA(I,D): 2.02 CM2
BH CV ECHO MEAS - EDV(CUBED): 46.7 ML
BH CV ECHO MEAS - EDV(MOD-SP2): 58 ML
BH CV ECHO MEAS - EDV(MOD-SP4): 51 ML
BH CV ECHO MEAS - EF(MOD-BP): 61.6 %
BH CV ECHO MEAS - EF(MOD-SP2): 63.8 %
BH CV ECHO MEAS - EF(MOD-SP4): 60.8 %
BH CV ECHO MEAS - ESV(CUBED): 14.6 ML
BH CV ECHO MEAS - ESV(MOD-SP2): 21 ML
BH CV ECHO MEAS - ESV(MOD-SP4): 20 ML
BH CV ECHO MEAS - FS: 32.1 %
BH CV ECHO MEAS - IVS/LVPW: 1.22 CM
BH CV ECHO MEAS - IVSD: 1.1 CM
BH CV ECHO MEAS - LAT PEAK E' VEL: 8.3 CM/SEC
BH CV ECHO MEAS - LV DIASTOLIC VOL/BSA (35-75): 28.6 CM2
BH CV ECHO MEAS - LV MASS(C)D: 107.9 GRAMS
BH CV ECHO MEAS - LV MAX PG: 4.3 MMHG
BH CV ECHO MEAS - LV MEAN PG: 2 MMHG
BH CV ECHO MEAS - LV SYSTOLIC VOL/BSA (12-30): 11.2 CM2
BH CV ECHO MEAS - LV V1 MAX: 104 CM/SEC
BH CV ECHO MEAS - LV V1 VTI: 21.6 CM
BH CV ECHO MEAS - LVIDD: 3.6 CM
BH CV ECHO MEAS - LVIDS: 2.44 CM
BH CV ECHO MEAS - LVOT AREA: 2.9 CM2
BH CV ECHO MEAS - LVOT DIAM: 1.93 CM
BH CV ECHO MEAS - LVPWD: 0.9 CM
BH CV ECHO MEAS - MED PEAK E' VEL: 7 CM/SEC
BH CV ECHO MEAS - MV A DUR: 0.11 SEC
BH CV ECHO MEAS - MV A MAX VEL: 117 CM/SEC
BH CV ECHO MEAS - MV DEC SLOPE: 436.8 CM/SEC2
BH CV ECHO MEAS - MV DEC TIME: 0.21 SEC
BH CV ECHO MEAS - MV E MAX VEL: 63.9 CM/SEC
BH CV ECHO MEAS - MV E/A: 0.55
BH CV ECHO MEAS - MV MAX PG: 7.6 MMHG
BH CV ECHO MEAS - MV MEAN PG: 3 MMHG
BH CV ECHO MEAS - MV P1/2T: 73.7 MSEC
BH CV ECHO MEAS - MV V2 VTI: 31 CM
BH CV ECHO MEAS - MVA(P1/2T): 3 CM2
BH CV ECHO MEAS - MVA(VTI): 2.03 CM2
BH CV ECHO MEAS - PA ACC TIME: 0.11 SEC
BH CV ECHO MEAS - PA V2 MAX: 105.2 CM/SEC
BH CV ECHO MEAS - PULM A REVS DUR: 0.09 SEC
BH CV ECHO MEAS - PULM A REVS VEL: 42.4 CM/SEC
BH CV ECHO MEAS - PULM DIAS VEL: 56.1 CM/SEC
BH CV ECHO MEAS - PULM S/D: 0.92
BH CV ECHO MEAS - PULM SYS VEL: 51.4 CM/SEC
BH CV ECHO MEAS - QP/QS: 0.71
BH CV ECHO MEAS - RV MAX PG: 2.31 MMHG
BH CV ECHO MEAS - RV V1 MAX: 76 CM/SEC
BH CV ECHO MEAS - RV V1 VTI: 15.3 CM
BH CV ECHO MEAS - RVOT DIAM: 1.93 CM
BH CV ECHO MEAS - SUP REN AO DIAM: 1.9 CM
BH CV ECHO MEAS - SV(LVOT): 63 ML
BH CV ECHO MEAS - SV(MOD-SP2): 37 ML
BH CV ECHO MEAS - SV(MOD-SP4): 31 ML
BH CV ECHO MEAS - SV(RVOT): 44.6 ML
BH CV ECHO MEAS - SVI(LVOT): 35.3 ML/M2
BH CV ECHO MEAS - SVI(MOD-SP2): 20.8 ML/M2
BH CV ECHO MEAS - SVI(MOD-SP4): 17.4 ML/M2
BH CV ECHO MEAS - TAPSE (>1.6): 1.88 CM
BH CV ECHO MEASUREMENTS AVERAGE E/E' RATIO: 8.35
BH CV XLRA - RV BASE: 2.36 CM
BH CV XLRA - RV LENGTH: 6.3 CM
BH CV XLRA - RV MID: 2.03 CM
BH CV XLRA - TDI S': 10 CM/SEC
LEFT ATRIUM VOLUME INDEX: 17.4 ML/M2
SINUS: 3.2 CM
STJ: 2.7 CM

## 2024-11-15 PROCEDURE — 93306 TTE W/DOPPLER COMPLETE: CPT

## 2024-11-18 ENCOUNTER — LAB (OUTPATIENT)
Facility: HOSPITAL | Age: 79
End: 2024-11-18
Payer: MEDICARE

## 2024-11-18 PROCEDURE — 85025 COMPLETE CBC W/AUTO DIFF WBC: CPT | Performed by: STUDENT IN AN ORGANIZED HEALTH CARE EDUCATION/TRAINING PROGRAM

## 2024-11-18 PROCEDURE — 80053 COMPREHEN METABOLIC PANEL: CPT | Performed by: STUDENT IN AN ORGANIZED HEALTH CARE EDUCATION/TRAINING PROGRAM

## 2024-11-18 PROCEDURE — 83036 HEMOGLOBIN GLYCOSYLATED A1C: CPT | Performed by: STUDENT IN AN ORGANIZED HEALTH CARE EDUCATION/TRAINING PROGRAM

## 2024-11-18 PROCEDURE — 82306 VITAMIN D 25 HYDROXY: CPT | Performed by: STUDENT IN AN ORGANIZED HEALTH CARE EDUCATION/TRAINING PROGRAM

## 2024-11-19 DIAGNOSIS — E55.9 VITAMIN D DEFICIENCY: Primary | ICD-10-CM

## 2024-11-19 RX ORDER — ERGOCALCIFEROL 1.25 MG/1
50000 CAPSULE, LIQUID FILLED ORAL WEEKLY
Qty: 8 CAPSULE | Refills: 0 | Status: SHIPPED | OUTPATIENT
Start: 2024-11-19

## 2024-11-25 ENCOUNTER — OFFICE VISIT (OUTPATIENT)
Dept: INTERNAL MEDICINE | Facility: CLINIC | Age: 79
End: 2024-11-25
Payer: MEDICARE

## 2024-11-25 VITALS
DIASTOLIC BLOOD PRESSURE: 78 MMHG | OXYGEN SATURATION: 99 % | BODY MASS INDEX: 29.23 KG/M2 | HEART RATE: 86 BPM | TEMPERATURE: 97.5 F | WEIGHT: 165 LBS | SYSTOLIC BLOOD PRESSURE: 132 MMHG | HEIGHT: 63 IN

## 2024-11-25 DIAGNOSIS — R29.6 MULTIPLE FALLS: ICD-10-CM

## 2024-11-25 DIAGNOSIS — R26.9 GAIT DISTURBANCE: ICD-10-CM

## 2024-11-25 DIAGNOSIS — R54 AGE-RELATED PHYSICAL DEBILITY: ICD-10-CM

## 2024-11-25 DIAGNOSIS — Z00.00 MEDICARE ANNUAL WELLNESS VISIT, SUBSEQUENT: Primary | ICD-10-CM

## 2024-11-25 DIAGNOSIS — R29.818 PARKINSONIAN FEATURES: ICD-10-CM

## 2024-11-25 DIAGNOSIS — E55.9 VITAMIN D DEFICIENCY: ICD-10-CM

## 2024-11-25 DIAGNOSIS — G89.29 CHRONIC LOW BACK PAIN, UNSPECIFIED BACK PAIN LATERALITY, UNSPECIFIED WHETHER SCIATICA PRESENT: ICD-10-CM

## 2024-11-25 DIAGNOSIS — M54.50 CHRONIC LOW BACK PAIN, UNSPECIFIED BACK PAIN LATERALITY, UNSPECIFIED WHETHER SCIATICA PRESENT: ICD-10-CM

## 2024-11-25 DIAGNOSIS — Z23 NEED FOR INFLUENZA VACCINATION: ICD-10-CM

## 2024-11-25 PROCEDURE — 3078F DIAST BP <80 MM HG: CPT | Performed by: STUDENT IN AN ORGANIZED HEALTH CARE EDUCATION/TRAINING PROGRAM

## 2024-11-25 PROCEDURE — 1125F AMNT PAIN NOTED PAIN PRSNT: CPT | Performed by: STUDENT IN AN ORGANIZED HEALTH CARE EDUCATION/TRAINING PROGRAM

## 2024-11-25 PROCEDURE — 99214 OFFICE O/P EST MOD 30 MIN: CPT | Performed by: STUDENT IN AN ORGANIZED HEALTH CARE EDUCATION/TRAINING PROGRAM

## 2024-11-25 PROCEDURE — 1160F RVW MEDS BY RX/DR IN RCRD: CPT | Performed by: STUDENT IN AN ORGANIZED HEALTH CARE EDUCATION/TRAINING PROGRAM

## 2024-11-25 PROCEDURE — G0008 ADMIN INFLUENZA VIRUS VAC: HCPCS | Performed by: STUDENT IN AN ORGANIZED HEALTH CARE EDUCATION/TRAINING PROGRAM

## 2024-11-25 PROCEDURE — 3075F SYST BP GE 130 - 139MM HG: CPT | Performed by: STUDENT IN AN ORGANIZED HEALTH CARE EDUCATION/TRAINING PROGRAM

## 2024-11-25 PROCEDURE — 90662 IIV NO PRSV INCREASED AG IM: CPT | Performed by: STUDENT IN AN ORGANIZED HEALTH CARE EDUCATION/TRAINING PROGRAM

## 2024-11-25 PROCEDURE — 1159F MED LIST DOCD IN RCRD: CPT | Performed by: STUDENT IN AN ORGANIZED HEALTH CARE EDUCATION/TRAINING PROGRAM

## 2024-11-25 PROCEDURE — G0439 PPPS, SUBSEQ VISIT: HCPCS | Performed by: STUDENT IN AN ORGANIZED HEALTH CARE EDUCATION/TRAINING PROGRAM

## 2024-11-25 PROCEDURE — 1170F FXNL STATUS ASSESSED: CPT | Performed by: STUDENT IN AN ORGANIZED HEALTH CARE EDUCATION/TRAINING PROGRAM

## 2024-11-25 RX ORDER — CARBIDOPA AND LEVODOPA 25; 100 MG/1; MG/1
TABLET ORAL
Qty: 90 TABLET | Refills: 0 | Status: SHIPPED | OUTPATIENT
Start: 2024-11-25

## 2024-11-25 NOTE — PROGRESS NOTES
Subjective   The ABCs of the Annual Wellness Visit  Medicare Wellness Visit      Kirti Santillan is a 78 y.o. patient who presents for a Medicare Wellness Visit.    The following portions of the patient's history were reviewed and   updated as appropriate: allergies, current medications, past family history, past medical history, past social history, past surgical history, and problem list.    Compared to one year ago, the patient's physical   health is the same.  Compared to one year ago, the patient's mental   health is the same.    Recent Hospitalizations:  This patient has had a [facility] admission record on file within the last 365 days.  Current Medical Providers:  Patient Care Team:  Deangelo Sprague MD as PCP - General (Internal Medicine)    Outpatient Medications Prior to Visit   Medication Sig Dispense Refill    apixaban (ELIQUIS) 5 MG tablet tablet Take 1 tablet by mouth Every 12 (Twelve) Hours. Indications: DVT/PE (active thrombosis) 60 tablet 2    atorvastatin (LIPITOR) 20 MG tablet Take 1 tablet by mouth Daily. 90 tablet 3    furosemide (LASIX) 20 MG tablet Take 1 tablet by mouth Daily. (Patient taking differently: Take 1 tablet by mouth Daily.) 30 tablet 0    gabapentin (NEURONTIN) 300 MG capsule TAKE 1 CAPSULE BY MOUTH 3 TIMES A DAY 90 capsule 0    vitamin D (ERGOCALCIFEROL) 1.25 MG (36064 UT) capsule capsule Take 1 capsule by mouth 1 (One) Time Per Week. 8 capsule 0    carbidopa-levodopa (SINEMET)  MG per tablet Take one daily 30 minutes minutes before meals for two weeks, then increase to twice daily 90 tablet 0     No facility-administered medications prior to visit.     No opioid medication identified on active medication list. I have reviewed chart for other potential  high risk medication/s and harmful drug interactions in the elderly.      Aspirin is not on active medication list.  Aspirin use is contraindicated for this patient due to: current use of Eliquis.  .    Patient Active  "Problem List   Diagnosis    Multiple falls    Abdominal aneurysm    Chronic back pain    Lumbar radiculopathy    Lumbar spinal stenosis    Primary hypertension    Prediabetes    Lumbar stenosis with neurogenic claudication    Displacement of lumbar intervertebral disc without myelopathy    Abnormal nuclear stress test    DVT (deep venous thrombosis)     Advance Care Planning Advance Directive is not on file.  ACP discussion was held with the patient during this visit. Patient has an advance directive (not in EMR), copy requested.            Objective   Vitals:    24 1417   BP: 132/78   Pulse: 86   Temp: 97.5 °F (36.4 °C)   TempSrc: Temporal   SpO2: 99%   Weight: 74.8 kg (165 lb)   Height: 160 cm (63\")       Estimated body mass index is 29.23 kg/m² as calculated from the following:    Height as of this encounter: 160 cm (63\").    Weight as of this encounter: 74.8 kg (165 lb).            Does the patient have evidence of cognitive impairment? Yes  Lab Results   Component Value Date    TRIG 126 10/29/2024    HDL 52 10/29/2024     (H) 10/29/2024    VLDL 22 10/29/2024    HGBA1C 6.20 (H) 2024                                                                                                Health  Risk Assessment    Smoking Status:  Social History     Tobacco Use   Smoking Status Former    Types: Cigarettes    Passive exposure: Past   Smokeless Tobacco Never     Alcohol Consumption:  Social History     Substance and Sexual Activity   Alcohol Use Not Currently       Fall Risk Screen  STEADI Fall Risk Assessment was completed, and patient is at MODERATE risk for falls. Assessment completed on:2024    Depression Screening   Little interest or pleasure in doing things? Not at all   Feeling down, depressed, or hopeless? Not at all   PHQ-2 Total Score 0      Health Habits and Functional and Cognitive Screenin/25/2024     2:17 PM   Functional & Cognitive Status   Do you have difficulty preparing " food and eating? Yes   Do you have difficulty bathing yourself, getting dressed or grooming yourself? Yes   Do you have difficulty using the toilet? Yes   Do you have difficulty moving around from place to place? Yes   Do you have trouble with steps or getting out of a bed or a chair? Yes   Current Diet Frequent Junk Food   Dental Exam Not up to date   Eye Exam Not up to date   Exercise (times per week) 0 times per week   Current Exercises Include No Regular Exercise   Do you need help using the phone?  No   Are you deaf or do you have serious difficulty hearing?  Yes   Do you need help to go to places out of walking distance? Yes   Do you need help shopping? Yes   Do you need help preparing meals?  Yes   Do you need help with housework?  Yes   Do you need help with laundry? Yes   Do you need help taking your medications? Yes   Do you need help managing money? Yes   Do you ever drive or ride in a car without wearing a seat belt? No   Have you felt unusual stress, anger or loneliness in the last month? No   Who do you live with? Spouse   If you need help, do you have trouble finding someone available to you? No   Have you been bothered in the last four weeks by sexual problems? No   Do you have difficulty concentrating, remembering or making decisions? Yes           Age-appropriate Screening Schedule:  Refer to the list below for future screening recommendations based on patient's age, sex and/or medical conditions. Orders for these recommended tests are listed in the plan section. The patient has been provided with a written plan.    Health Maintenance List  Health Maintenance   Topic Date Due    DIABETIC FOOT EXAM  Never done    DIABETIC EYE EXAM  Never done    URINE MICROALBUMIN  Never done    TDAP/TD VACCINES (1 - Tdap) Never done    ZOSTER VACCINE (1 of 2) Never done    RSV Vaccine - Adults (1 - 1-dose 75+ series) Never done    INFLUENZA VACCINE  08/01/2024    COVID-19 Vaccine (4 - 2024-25 season) 09/01/2024     "ANNUAL WELLNESS VISIT  11/21/2024    BMI FOLLOWUP  12/15/2024    HEMOGLOBIN A1C  05/18/2025    DXA SCAN  12/14/2025    HEPATITIS C SCREENING  Completed    Pneumococcal Vaccine 65+  Completed                                                                                                                                                CMS Preventative Services Quick Reference  Risk Factors Identified During Encounter  Depression/Dysphoria: Elevated PHQ score reflective of other stress or illness, not depression  Fall Risk-High or Moderate: Discussed Fall Prevention in the home and Referral Placed for Physical Therapy  Immunizations Discussed/Encouraged: Tdap, Influenza, Shingrix, COVID19, and RSV (Respiratory Syncytial Virus)  Dental Screening Recommended  Vision Screening Recommended    The above risks/problems have been discussed with the patient.  Pertinent information has been shared with the patient in the After Visit Summary.  An After Visit Summary and PPPS were made available to the patient.    Follow Up:   Next Medicare Wellness visit to be scheduled in 1 year.         Additional E&M Note during same encounter follows:  Patient has additional, significant, and separately identifiable condition(s)/problem(s) that require work above and beyond the Medicare Wellness Visit     Chief Complaint  No chief complaint on file.    Subjective   HPI  Kirti is also being seen today for additional medical problem/s.    Follow-up of chronic weakness and inability to ambulate without assistance.  She has since seen neurology who started therapeutic trial of Sinemet, however she did not get this refilled after she completed initial dose due to missing follow-up appointment                    Objective   Vital Signs:  /78   Pulse 86   Temp 97.5 °F (36.4 °C) (Temporal)   Ht 160 cm (63\")   Wt 74.8 kg (165 lb)   SpO2 99%   BMI 29.23 kg/m²   Physical Exam  Vitals reviewed.   Constitutional:       General: She is not in " acute distress.     Appearance: Normal appearance. She is ill-appearing. She is not toxic-appearing.   HENT:      Head: Normocephalic and atraumatic.   Eyes:      General: No scleral icterus.     Conjunctiva/sclera: Conjunctivae normal.   Cardiovascular:      Rate and Rhythm: Normal rate and regular rhythm.   Musculoskeletal:      Right lower le+ Pitting Edema present.      Left lower le+ Pitting Edema present.   Skin:     General: Skin is warm and dry.   Neurological:      Mental Status: She is alert and oriented to person, place, and time.      Motor: Weakness present.      Gait: Gait abnormal.      Comments: Unable to stand without assistance, significantly weak with hip flexion and extension at left leg   Psychiatric:         Mood and Affect: Mood normal.         Behavior: Behavior normal.                 Assessment and Plan               Medicare annual wellness visit, subsequent    Parkinsonian features    Gait disturbance    Vitamin D deficiency    Multiple falls    Age-related physical debility    Chronic low back pain, unspecified back pain laterality, unspecified whether sciatica present    Diagnoses and all orders for this visit:    1. Medicare annual wellness visit, subsequent (Primary)    2. Parkinsonian features  -     carbidopa-levodopa (SINEMET)  MG per tablet; Take one daily 30 minutes minutes before meals for two weeks, then increase to twice daily  Dispense: 90 tablet; Refill: 0    3. Gait disturbance  -     carbidopa-levodopa (SINEMET)  MG per tablet; Take one daily 30 minutes minutes before meals for two weeks, then increase to twice daily  Dispense: 90 tablet; Refill: 0    4. Vitamin D deficiency  -     Vitamin D,25-Hydroxy; Future    5. Multiple falls  -     Ambulatory Referral to Home Health    6. Age-related physical debility  -     Ambulatory Referral to Home Health    7. Chronic low back pain, unspecified back pain laterality, unspecified whether sciatica present  -      Ambulatory Referral to Home Health       Reviewed neurology, cardiology office visit, labs obtained since last visit    She continues to have profound weakness and inability to ambulate without assistance.  She has since seen neurology who tried Sinemet however she reports minimal to very little improvement after starting this.  She did have some improvement in functional status after home health and I do think she would benefit from continued therapy with PT and OT given her fall risk so I have replaced order pending approval this can get done    I have also sent in a refill of the Sinemet that her previous neurologist prescribed, reinforced importance of keeping follow-up appointment in January    Will also plan to recheck vitamin D after recent initiation of supplementation    Return to clinic in 3 to 4 months or sooner as needed    Patient has been erroneously marked as diabetic. Based on the available clinical information, she does not have diabetes and should therefore be excluded from diabetic health maintenance and quality measures for the remainder of the reporting period.          New Medications Ordered This Visit   Medications    carbidopa-levodopa (SINEMET)  MG per tablet     Sig: Take one daily 30 minutes minutes before meals for two weeks, then increase to twice daily     Dispense:  90 tablet     Refill:  0          Follow Up   Return in about 4 months (around 3/25/2025).  Patient was given instructions and counseling regarding her condition or for health maintenance advice. Please see specific information pulled into the AVS if appropriate.

## 2024-11-27 DIAGNOSIS — M54.16 LUMBAR RADICULOPATHY: ICD-10-CM

## 2024-11-27 RX ORDER — GABAPENTIN 300 MG/1
300 CAPSULE ORAL 3 TIMES DAILY
Qty: 90 CAPSULE | Refills: 0 | Status: SHIPPED | OUTPATIENT
Start: 2024-11-27

## 2024-11-27 NOTE — TELEPHONE ENCOUNTER
Caller: Kirti Santillan    Relationship: Self    Best call back number: [phone]    Requested Prescriptions:   Requested Prescriptions     Pending Prescriptions Disp Refills    gabapentin (NEURONTIN) 300 MG capsule 90 capsule 0     Sig: Take 1 capsule by mouth 3 (Three) Times a Day.    apixaban (ELIQUIS) 5 MG tablet tablet 60 tablet 2     Sig: Take 1 tablet by mouth Every 12 (Twelve) Hours. Indications: DVT/PE (active thrombosis)        Pharmacy where request should be sent: [facility] [id number]  AT [facility] [address] - [phone]  - [phone] FX     Last office visit with prescribing clinician: 11/25/2024   Last telemedicine visit with prescribing clinician: Visit date not found   Next office visit with prescribing clinician: 3/26/2025     Additional details provided by patient: PATIENT IS COMPLETELY OUT OF GABAPENTIN    Does the patient have less than a 3 day supply:  [x] Yes  [] No    Would you like a call back once the refill request has been completed: [] Yes [x] No    If the office needs to give you a call back, can they leave a voicemail: [] Yes [x] No    Amy Loyd Rep   11/27/24 11:58 EST

## 2024-12-02 ENCOUNTER — TELEPHONE (OUTPATIENT)
Dept: INTERNAL MEDICINE | Facility: CLINIC | Age: 79
End: 2024-12-02
Payer: MEDICARE

## 2024-12-02 NOTE — TELEPHONE ENCOUNTER
Caller: SYDEA OSBORN    Relationship:     Best call back number: [phone]       Who are you requesting to speak with (clinical staff, provider,  specific staff member): PCP OR CLINICAL        What was the call regarding: NEED VERBAL ORDERS FOR OCCUPATIONAL THERAPY 2 TIMES A WEEK FOR 1 WEEK AND 1 TIME A WEEK FOR 7 WEEKS.      SYEDA COMPLETED START OF CARE VISIT ON 12/2/24

## 2024-12-03 ENCOUNTER — TELEPHONE (OUTPATIENT)
Dept: INTERNAL MEDICINE | Facility: CLINIC | Age: 79
End: 2024-12-03
Payer: MEDICARE

## 2024-12-03 NOTE — TELEPHONE ENCOUNTER
[facility]: need continuation of care for Physical Therapy-twice a week for 2 weeks, (then) once a week for 6 weeks. [phone]

## 2024-12-11 ENCOUNTER — TELEPHONE (OUTPATIENT)
Dept: INTERNAL MEDICINE | Facility: CLINIC | Age: 79
End: 2024-12-11
Payer: MEDICARE

## 2024-12-11 NOTE — TELEPHONE ENCOUNTER
Yamileth with [facility] called to inform Dr. Deangelo Sprague that the patient had a fall, she was getting ready to sit in her recliner which has a pad on it, when she went to sit down the pad and the patient slid to the floor, patient's  was able to get her back into the chair, no injuries.  Yamileth [phone]

## 2025-01-06 DIAGNOSIS — M54.16 LUMBAR RADICULOPATHY: ICD-10-CM

## 2025-01-06 RX ORDER — GABAPENTIN 300 MG/1
300 CAPSULE ORAL 3 TIMES DAILY
Qty: 90 CAPSULE | Refills: 0 | Status: SHIPPED | OUTPATIENT
Start: 2025-01-06

## 2025-01-08 ENCOUNTER — TELEPHONE (OUTPATIENT)
Dept: INTERNAL MEDICINE | Facility: CLINIC | Age: 80
End: 2025-01-08
Payer: MEDICARE

## 2025-01-08 NOTE — TELEPHONE ENCOUNTER
If she is having symptoms she needs to be seen in ER or urgent care. If not, I can see her in office to discuss starting medication

## 2025-01-08 NOTE — TELEPHONE ENCOUNTER
SRAVAN with [facility] called stating that the pts BP is going up  today it was 186/104. Please call Sravan @ [phone] with advice on what we need to do

## 2025-01-08 NOTE — TELEPHONE ENCOUNTER
Appointment made with  on Friday, informed patient per  if she has any vision changes, HA's, dizziness to go to the ER. Patient voiced understanding

## 2025-01-10 ENCOUNTER — OFFICE VISIT (OUTPATIENT)
Dept: INTERNAL MEDICINE | Facility: CLINIC | Age: 80
End: 2025-01-10
Payer: MEDICARE

## 2025-01-10 VITALS
SYSTOLIC BLOOD PRESSURE: 108 MMHG | HEART RATE: 72 BPM | DIASTOLIC BLOOD PRESSURE: 74 MMHG | RESPIRATION RATE: 16 BRPM | TEMPERATURE: 98 F

## 2025-01-10 DIAGNOSIS — I10 PRIMARY HYPERTENSION: Primary | Chronic | ICD-10-CM

## 2025-01-10 PROCEDURE — 99213 OFFICE O/P EST LOW 20 MIN: CPT | Performed by: NURSE PRACTITIONER

## 2025-01-10 PROCEDURE — 1160F RVW MEDS BY RX/DR IN RCRD: CPT | Performed by: NURSE PRACTITIONER

## 2025-01-10 PROCEDURE — 1159F MED LIST DOCD IN RCRD: CPT | Performed by: NURSE PRACTITIONER

## 2025-01-10 PROCEDURE — 3074F SYST BP LT 130 MM HG: CPT | Performed by: NURSE PRACTITIONER

## 2025-01-10 PROCEDURE — 1125F AMNT PAIN NOTED PAIN PRSNT: CPT | Performed by: NURSE PRACTITIONER

## 2025-01-10 PROCEDURE — 3078F DIAST BP <80 MM HG: CPT | Performed by: NURSE PRACTITIONER

## 2025-01-10 NOTE — PROGRESS NOTES
Subjective   Chief Complaint   Patient presents with    Hypertension       History of Present Illness   79 y.o. female presents with cc elevated BP; she is followed by PCP Dr. Sprague.     Home health called earlier this week with /104. Denied associated chest pain, dyspnea, HA, changes in vision, trouble speaking, swallowing or unilateral weakness. Notes chronic back pain. No recent changes to medications. No no OTC meds or supplements.      Patient Active Problem List   Diagnosis    Multiple falls    Abdominal aneurysm    Chronic back pain    Lumbar radiculopathy    Lumbar spinal stenosis    Primary hypertension    Prediabetes    Lumbar stenosis with neurogenic claudication    Displacement of lumbar intervertebral disc without myelopathy    Abnormal nuclear stress test    DVT (deep venous thrombosis)       Allergies   Allergen Reactions    Hydrocodone Nausea And Vomiting       Current Outpatient Medications on File Prior to Visit   Medication Sig Dispense Refill    apixaban (ELIQUIS) 5 MG tablet tablet Take 1 tablet by mouth Every 12 (Twelve) Hours. Indications: DVT/PE (active thrombosis) 60 tablet 2    atorvastatin (LIPITOR) 20 MG tablet Take 1 tablet by mouth Daily. 90 tablet 3    carbidopa-levodopa (SINEMET)  MG per tablet Take one daily 30 minutes minutes before meals for two weeks, then increase to twice daily 90 tablet 0    furosemide (LASIX) 20 MG tablet Take 1 tablet by mouth Daily. (Patient taking differently: Take 1 tablet by mouth Daily.) 30 tablet 0    gabapentin (NEURONTIN) 300 MG capsule Take 1 capsule by mouth 3 (Three) Times a Day. 90 capsule 0    vitamin D (ERGOCALCIFEROL) 1.25 MG (71740 UT) capsule capsule Take 1 capsule by mouth 1 (One) Time Per Week. (Patient not taking: Reported on 1/10/2025) 8 capsule 0     No current facility-administered medications on file prior to visit.       Past Medical History:   Diagnosis Date    Low back pain     Lumbar radiculopathy 11/02/2023     Sciatica of left side        History reviewed. No pertinent family history.    Social History     Socioeconomic History    Marital status:    Tobacco Use    Smoking status: Former     Types: Cigarettes     Passive exposure: Past    Smokeless tobacco: Never   Vaping Use    Vaping status: Never Used   Substance and Sexual Activity    Alcohol use: Not Currently    Drug use: Never    Sexual activity: Not Currently       Past Surgical History:   Procedure Laterality Date    EPIDURAL BLOCK      EYE SURGERY      LUMBAR EPIDURAL INJECTION N/A 5/6/2024    Procedure: LUMBAR EPIDURAL 1ST VISIT L4-5;  Surgeon: Nikole Baig MD;  Location: [facility] MAIN OR;  Service: Pain Management;  Laterality: N/A;       The following portions of the patient's history were reviewed and updated as appropriate: problem list, allergies, current medications, past medical history, and past social history.    Review of Systems    Immunization History   Administered Date(s) Administered    COVID-19 (PFIZER) Purple Cap Monovalent 03/02/2021, 03/23/2021, 11/22/2021    Fluzone High-Dose 65+YRS 11/25/2024    Fluzone High-Dose 65+yrs 11/21/2023    Pneumococcal Conjugate 20-Valent (PCV20) 02/21/2024       Objective   Vitals:    01/10/25 0854   BP: 108/74   Pulse: 72   Resp: 16   Temp: 98 °F (36.7 °C)     There is no height or weight on file to calculate BMI.  Physical Exam  Constitutional:       Appearance: Normal appearance.   HENT:      Head: Normocephalic and atraumatic.      Comments: Mask like face.   Cardiovascular:      Rate and Rhythm: Normal rate and regular rhythm.   Pulmonary:      Effort: Pulmonary effort is normal.      Breath sounds: Normal breath sounds.   Musculoskeletal:      Comments: W/C.    Skin:     General: Skin is warm and dry.   Neurological:      Mental Status: She is alert.   Psychiatric:         Mood and Affect: Mood normal.         Behavior: Behavior normal.         Procedures    Assessment & Plan   Diagnoses and all  orders for this visit:    1. Primary hypertension (Primary)  Comments:  Controlled with /74 today. Instructed in HBPM. They will return in 2 weeks with HBPM and readings. No changes in medications at this time.    20 minutes spent with patient and her  reviewing records; taking history; examining patient; counseling, instructing HBPM, and documenting care.     Records reviewed include previous OV with Dr. Sprague and labs.     Return in about 2 weeks (around 1/24/2025) for Dr. Sprague.

## 2025-01-15 ENCOUNTER — TELEPHONE (OUTPATIENT)
Dept: INTERNAL MEDICINE | Facility: CLINIC | Age: 80
End: 2025-01-15
Payer: MEDICARE

## 2025-01-15 NOTE — TELEPHONE ENCOUNTER
Caller: NAPOLEON OSBORN    Relationship: Home Health    Best call back number: [phone]     What orders are you requesting (i.e. lab or imaging): VERBAL ORDERS OF  AND NURSING EVALUATIONS    In what timeframe would the patient need to come in: AS SOON AS POSSIBLE    Where will you receive your lab/imaging services: IN HOME    Additional notes: [facility] IS REQUESTING EVALUATIONS FOR  FOR COMMUNITY RESOURCES AND NURSING FOR MEDICATION MANAGEMENT. PLEASE CALL WITH VERBAL ORDERS.

## 2025-01-21 ENCOUNTER — TELEPHONE (OUTPATIENT)
Dept: INTERNAL MEDICINE | Facility: CLINIC | Age: 80
End: 2025-01-21

## 2025-01-21 NOTE — TELEPHONE ENCOUNTER
Caller: RACHEL WITH [facility]    Relationship to patient: OTHER     Best call back number:     Patient is needing: PLEASE CALL RACHEL WITH VERBAL ORDERS FOR HOME HEALTH NURSING.       RACHEL ALSO NEEDS TO KNOW IF PATIENT IS SUPPOSED TO BE TAKING FUROSEMIDE 20 MG DAILY.  RACHEL STATES SHE THE PATIENT STATES SHE DOES NOT TAKE IT NOR DOES SHE WANT TO TAKE IT, BUT IT IS ON HER MED LIST FROM DR. BAUMANN.   PLEASE ADVISE.

## 2025-01-21 NOTE — TELEPHONE ENCOUNTER
We initiated that to help with leg swelling. If she is not having significant swelling in her leg she is fine to hold off on it

## 2025-01-22 DIAGNOSIS — M79.89 LEG SWELLING: ICD-10-CM

## 2025-01-22 RX ORDER — FUROSEMIDE 20 MG/1
20 TABLET ORAL DAILY
Qty: 30 TABLET | Refills: 0 | Status: CANCELLED | OUTPATIENT
Start: 2025-01-22

## 2025-01-22 NOTE — TELEPHONE ENCOUNTER
Antionette advised states that patient never started taking the medication and that she says nobody told her she should be taking HH nurse says left leg is extremely swollen painful to the turn red irritated and thinks that a clot is present says that the patient has hx of clot in that leg and would like you to check it tomorrow at her apt has requested that if there is anything she can do to assist us with her care please to reach out and let her know she doesn't mind

## 2025-01-23 ENCOUNTER — OFFICE VISIT (OUTPATIENT)
Dept: INTERNAL MEDICINE | Facility: CLINIC | Age: 80
End: 2025-01-23
Payer: MEDICARE

## 2025-01-23 VITALS
BODY MASS INDEX: 29.23 KG/M2 | SYSTOLIC BLOOD PRESSURE: 130 MMHG | WEIGHT: 165 LBS | DIASTOLIC BLOOD PRESSURE: 82 MMHG | HEART RATE: 89 BPM | HEIGHT: 63 IN | OXYGEN SATURATION: 97 %

## 2025-01-23 DIAGNOSIS — I82.502 CHRONIC DEEP VEIN THROMBOSIS (DVT) OF LEFT LOWER EXTREMITY, UNSPECIFIED VEIN: ICD-10-CM

## 2025-01-23 DIAGNOSIS — I10 PRIMARY HYPERTENSION: Primary | ICD-10-CM

## 2025-01-23 DIAGNOSIS — M79.89 LEG SWELLING: ICD-10-CM

## 2025-01-23 DIAGNOSIS — R73.03 PREDIABETES: ICD-10-CM

## 2025-01-23 DIAGNOSIS — E78.5 HYPERLIPIDEMIA, UNSPECIFIED HYPERLIPIDEMIA TYPE: ICD-10-CM

## 2025-01-23 PROCEDURE — 3075F SYST BP GE 130 - 139MM HG: CPT | Performed by: STUDENT IN AN ORGANIZED HEALTH CARE EDUCATION/TRAINING PROGRAM

## 2025-01-23 PROCEDURE — 1159F MED LIST DOCD IN RCRD: CPT | Performed by: STUDENT IN AN ORGANIZED HEALTH CARE EDUCATION/TRAINING PROGRAM

## 2025-01-23 PROCEDURE — G2211 COMPLEX E/M VISIT ADD ON: HCPCS | Performed by: STUDENT IN AN ORGANIZED HEALTH CARE EDUCATION/TRAINING PROGRAM

## 2025-01-23 PROCEDURE — 99214 OFFICE O/P EST MOD 30 MIN: CPT | Performed by: STUDENT IN AN ORGANIZED HEALTH CARE EDUCATION/TRAINING PROGRAM

## 2025-01-23 PROCEDURE — 1125F AMNT PAIN NOTED PAIN PRSNT: CPT | Performed by: STUDENT IN AN ORGANIZED HEALTH CARE EDUCATION/TRAINING PROGRAM

## 2025-01-23 PROCEDURE — 1160F RVW MEDS BY RX/DR IN RCRD: CPT | Performed by: STUDENT IN AN ORGANIZED HEALTH CARE EDUCATION/TRAINING PROGRAM

## 2025-01-23 PROCEDURE — 3079F DIAST BP 80-89 MM HG: CPT | Performed by: STUDENT IN AN ORGANIZED HEALTH CARE EDUCATION/TRAINING PROGRAM

## 2025-01-23 RX ORDER — AMLODIPINE BESYLATE 5 MG/1
5 TABLET ORAL DAILY
Qty: 90 TABLET | Refills: 1 | Status: SHIPPED | OUTPATIENT
Start: 2025-01-23

## 2025-01-23 RX ORDER — ATORVASTATIN CALCIUM 20 MG/1
20 TABLET, FILM COATED ORAL DAILY
Qty: 90 TABLET | Refills: 1 | Status: SHIPPED | OUTPATIENT
Start: 2025-01-23

## 2025-01-23 RX ORDER — FUROSEMIDE 20 MG/1
20 TABLET ORAL DAILY
Qty: 90 TABLET | Refills: 1 | Status: CANCELLED | OUTPATIENT
Start: 2025-01-23

## 2025-01-23 NOTE — PROGRESS NOTES
"Chief Complaint  Hypertension    Subjective        Kirti Santillan presents to [facility]  Hypertension        Presents to clinic today for hypertension    She was seen here in office a couple weeks ago after concern for uncontrolled hypertension however suspected that home health monitoring did not have accurate measurements as it was well within goal during office visit    She states home health as well as herself has been monitoring it at home and she continues to get elevated readings ranging from 135-185/. Average is roughly ~145/87. She denies any headaches, chest pain, dyspnea or other symptoms.    Objective   Vital Signs:  /82   Pulse 89   Ht 160 cm (63\")   Wt 74.8 kg (165 lb)   SpO2 97%   BMI 29.23 kg/m²   Estimated body mass index is 29.23 kg/m² as calculated from the following:    Height as of this encounter: 160 cm (63\").    Weight as of this encounter: 74.8 kg (165 lb).          Physical Exam  Vitals reviewed.   Constitutional:       General: She is not in acute distress.     Appearance: Normal appearance. She is ill-appearing. She is not toxic-appearing.   HENT:      Head: Normocephalic and atraumatic.   Eyes:      General: No scleral icterus.     Conjunctiva/sclera: Conjunctivae normal.   Musculoskeletal:      Right lower le+ Pitting      Left lower le+ Pitting   Skin:     General: Skin is warm and dry.   Neurological:      Mental Status: She is alert and oriented to person, place, and time. Mental status is at baseline.   Psychiatric:         Mood and Affect: Mood normal.         Behavior: Behavior normal.        Result Review :                Assessment and Plan   Diagnoses and all orders for this visit:    1. Primary hypertension (Primary)  -     amLODIPine (NORVASC) 5 MG tablet; Take 1 tablet by mouth Daily.  Dispense: 90 tablet; Refill: 1  -     Comprehensive Metabolic Panel; Future    2. Hyperlipidemia, unspecified hyperlipidemia type  -    "  atorvastatin (LIPITOR) 20 MG tablet; Take 1 tablet by mouth Daily.  Dispense: 90 tablet; Refill: 1  -     Lipid Panel With / Chol / HDL Ratio; Future    3. Leg swelling    4. Prediabetes  -     Hemoglobin A1c; Future    5. Chronic deep vein thrombosis (DVT) of left lower extremity, unspecified vein  -     CBC Auto Differential; Future      Reviewed home health and patient reported home readings. Will plan to start amlodipine at 5mg for now as does appear to have somewhat consistent readings with both her measurements and HH measurements. She is very high fall risk so need to be mindful to avoid orthostasis but she also has known AAA so BP control needs to be priority.     HH was concerned regarding new DVT in left leg due to swelling. She reports no significant increase in leg swelling or pain, and appears consistent with her previous examinations and she is compliant with Eliquis.Low suspicion for DVT at this moment but advised to continue to monitor    I did request that she bring in all of her medications/pills as well as blood pressure cuff to next visit to confirm what she is taking. She reports taking atorvastatin but does not appear to be filled recently.     RTC as previously scheduled or sooner prn, labs to be done 1wk prior         Follow Up   Return in about 2 months (around 3/26/2025).  Patient was given instructions and counseling regarding her condition or for health maintenance advice. Please see specific information pulled into the AVS if appropriate.

## 2025-01-24 DIAGNOSIS — R29.818 PARKINSONIAN FEATURES: ICD-10-CM

## 2025-01-24 DIAGNOSIS — R26.9 GAIT DISTURBANCE: ICD-10-CM

## 2025-01-25 RX ORDER — CARBIDOPA AND LEVODOPA 25; 100 MG/1; MG/1
TABLET ORAL
Qty: 90 TABLET | Refills: 0 | OUTPATIENT
Start: 2025-01-25

## 2025-01-25 NOTE — TELEPHONE ENCOUNTER
Infusion Nursing Note:  Kt DAVID Rasmussen presents today for C2D1 Alimta.    Patient seen by provider today: Yes: Dr. John   present during visit today: Not Applicable.    Note: N/A.      Intravenous Access:  Implanted Port.    Treatment Conditions:  Lab Results   Component Value Date    HGB 11.6 (L) 07/24/2024    WBC 5.9 07/24/2024    ANEU 0.5 (L) 08/11/2022    ANEUTAUTO 4.2 07/24/2024     07/24/2024        Lab Results   Component Value Date     01/25/2023    POTASSIUM 4.0 01/25/2023    MAG 1.9 10/13/2016    CR 0.67 07/24/2024    BEVERLY 9.3 01/25/2023    BILITOTAL 0.6 06/20/2024    ALBUMIN 3.9 06/20/2024    ALT 20 06/20/2024    AST 23 06/20/2024       Results reviewed, labs MET treatment parameters, ok to proceed with treatment.      Post Infusion Assessment:  Patient tolerated infusion without incident.  Blood return noted pre and post infusion.  Site patent and intact, free from redness, edema or discomfort.  No evidence of extravasations.  Access discontinued per protocol.       Discharge Plan:   Discharge instructions reviewed with: Patient.  Patient and/or family verbalized understanding of discharge instructions and all questions answered.  AVS to patient via [employer].  Patient will return 8/14/24 for next appointment.   Patient discharged in stable condition accompanied by: self.  Departure Mode: Wheelchair.      Stacy Minor RN     Managed by neurologist

## 2025-01-28 ENCOUNTER — TELEPHONE (OUTPATIENT)
Dept: INTERNAL MEDICINE | Facility: CLINIC | Age: 80
End: 2025-01-28

## 2025-01-28 DIAGNOSIS — R29.818 PARKINSONIAN FEATURES: ICD-10-CM

## 2025-01-28 DIAGNOSIS — R26.9 GAIT DISTURBANCE: ICD-10-CM

## 2025-01-28 RX ORDER — CARBIDOPA AND LEVODOPA 25; 100 MG/1; MG/1
1 TABLET ORAL 2 TIMES DAILY
Qty: 90 TABLET | Refills: 0 | Status: SHIPPED | OUTPATIENT
Start: 2025-01-28

## 2025-01-28 RX ORDER — CARBIDOPA AND LEVODOPA 25; 100 MG/1; MG/1
1 TABLET ORAL 2 TIMES DAILY
Qty: 90 TABLET | Refills: 0 | OUTPATIENT
Start: 2025-01-28

## 2025-01-28 NOTE — TELEPHONE ENCOUNTER
Caller: ANURAG    Relationship: Other    Best call back number: [phone]     What was the call regarding: ERIC WANTED TO INFORM DR BAUMANN THAT THIS PATIENT HAS BEEN EXTENDED FOR SKILLED NURSING FOR 60 DAYS.     PATIENT HAS ALSO BEEN PRESCRIBED A NEW MEDICATION OF AMLODIPINE 5 MG DAILY.     PLEASE CALL WITH ANY QUESTIONS OR CONCERNS.

## 2025-01-30 ENCOUNTER — READMISSION MANAGEMENT (OUTPATIENT)
Dept: CALL CENTER | Facility: HOSPITAL | Age: 80
End: 2025-01-30
Payer: MEDICARE

## 2025-01-30 NOTE — OUTREACH NOTE
Prep Survey      Flowsheet Row Responses   [facility] facility patient discharged from? Non-BH   Is LACE score < 7 ? Non-BH Discharge   Eligibility Not Eligible   What are the reasons patient is not eligible? Other  [No recent admissions.]   Does the patient have one of the following disease processes/diagnoses(primary or secondary)? Other   Prep survey completed? Yes            Kathia MONROE - Registered Nurse

## 2025-01-31 NOTE — TELEPHONE ENCOUNTER
Caller: Kirti Santillan    Relationship: Self    Best call back number: [phone]    What is the best time to reach you: ANY    Who are you requesting to speak with (clinical staff, provider,  specific staff member): ANY    What was the call regarding: CALLED TO CHECK STATUS OF SCRIPT. STATES SHE HAS BEEN OUT FOR A WHILE & HER PCP WAS GOING TO SEND SCRIPT IN BUT DID NOT & ADVISED HER TO REACH OUT TO DR WHITMORE.

## 2025-02-03 DIAGNOSIS — R26.9 GAIT DISTURBANCE: ICD-10-CM

## 2025-02-03 DIAGNOSIS — R29.818 PARKINSONIAN FEATURES: ICD-10-CM

## 2025-02-03 NOTE — TELEPHONE ENCOUNTER
Caller: Kirti Santillan    Relationship: Self    Best call back number: [phone]    Requested Prescriptions:   Requested Prescriptions     Pending Prescriptions Disp Refills    carbidopa-levodopa (SINEMET)  MG per tablet 90 tablet 0     Sig: Take one daily 30 minutes minutes before meals for two weeks, then increase to twice daily        Pharmacy where request should be sent: [facility]     Last office visit with prescribing clinician: 9/24/2024   Last telemedicine visit with prescribing clinician: Visit date not found   Next office visit with prescribing clinician: 2/20/2025     Additional details provided by patient:   PT HAS BEEN OUT FOR ABOUT 3 WEEKS NOW.    Does the patient have less than a 3 day supply:  [x] Yes  [] No    Would you like a call back once the refill request has been completed: [] Yes [] No    If the office needs to give you a call back, can they leave a voicemail: [] Yes [] No    Amy Henry Rep   02/03/25 10:26 EST

## 2025-02-04 RX ORDER — CARBIDOPA AND LEVODOPA 25; 100 MG/1; MG/1
1 TABLET ORAL 2 TIMES DAILY
Qty: 60 TABLET | Refills: 0 | Status: SHIPPED | OUTPATIENT
Start: 2025-02-04 | End: 2025-02-04

## 2025-02-04 RX ORDER — CARBIDOPA AND LEVODOPA 25; 100 MG/1; MG/1
1 TABLET ORAL 2 TIMES DAILY
Qty: 60 TABLET | Refills: 0 | Status: SHIPPED | OUTPATIENT
Start: 2025-02-04

## 2025-02-10 DIAGNOSIS — M54.16 LUMBAR RADICULOPATHY: ICD-10-CM

## 2025-02-10 RX ORDER — GABAPENTIN 300 MG/1
300 CAPSULE ORAL 3 TIMES DAILY
Qty: 270 CAPSULE | Refills: 0 | Status: SHIPPED | OUTPATIENT
Start: 2025-02-10

## 2025-02-19 NOTE — PROGRESS NOTES
Patient ID: Kirti Santillan  Age: 79 y.o.   Chief compliant:   Chief Complaint   Patient presents with    shuffling gait     F/u       Portions of this assessment have been copied from previous documentation which has been thoroughly reviewed and updated as appropriate.    Assessment/Plan:    Kirti Santillan is a 79 y.o. female presents with concerns for gait difficulty, slowness of gait and freezing. Patient does have significantly stooped posture with en bloc turning as well as mild masked facies. No significant cogwheel rigidity or resting tremor, although mild postural tremor is noted. There is question of some parkinsonian features to her examination which may be contributed by arthritic disease of the back and spine. She did not have a robust response to Sinemet, but had difficulty tolerating higher dose.  Patient was counseled on adjusting her dose to take with meals, particularly a low protein snack such as crackers and juice.  She is willing to try this again to get up to a dose of 3 times a day.  Pending response, may consider skin biopsy for diagnostic confirmation before pursuing alternative treatment rounds.           Diagnoses and all orders for this visit:    1. Parkinsonian features (Primary)    2. Gait disturbance    3. Carpal tunnel syndrome of left wrist           Gennaro Grant MD          Interval history (2/20/2025):   No new concerns. She has gone up to Sinemet 1 tablet  twice a day briefly. She took it about 20 minutes after eating. She noticed nausea and vomited once with the second dose, so she stopped and has been taking this once daily.  She thinks tremor has gotten better. It is mostly in right hand. Today notes it more at rest.   She is on gabapentin for back pain. It is in both sides. No neck pain. No surgical history.   Left hand first 3 fingers are numb. Trouble closing her hand. She thinks the right hand is starting to get numb and weak as well. Numbness in the left leg.  No bowel or bladder trouble.     Initial HPI (9/24/2024):  Kirti Santillan is a 78 y.o. female with history of HTN, prediabetes, lumbar spinal stenosis.  She was last seen by her PCP on 7/10/2024.  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  Appears in the interim after conversations between physical therapy and her PCP, she was referred to neurology for gait abnormalities. She has previously seen neurosurgery in the hospital on 11/1/2023 for lumbar spinal stenosis and gait difficulty. She received an epidural injection and had improvement in her pain, but noted to have shuffled and forward flexed posture. She followed up with Dr. Giles in clinic.     She reports concerns about her legs swelling over recent months, worse on the left. Potentially better since starting the blood thinner. She does not think she is taking Lasix. She feels like the left leg doesn't have any feeling, all the way down. It is tingling sensation. She has been on gabapentin 300 mg TID which has helped her pain significantly. Denies any ongoing back pain. She did get two epidurals which were painful and didn't help. She can stand, but reports her legs won't move at times. She has a potty chair, but has trouble getting there.      She reports her legs get stuck while walking, particularly her left leg. She notices shuffling her feet. She reports having a tremor particularly with activities. Handwriting is very small and can't form letters properly. She has had multiple falls. She needs the walker for support. She reports her movements are slower.  Smell is fine. Sleeps okay except waking to get to the bathroom. No odd sleep behaviors. No lightheadedness on standing.      For the last year, she denies having any feeling in  her 1st 3 fingers on her left hand, occasionally similar on the 4th finger. Reports hand  is weak.     No family history of any neurological issues.             Vitals:    02/20/25 1101   BP: 110/60   Pulse: 100   SpO2: 100%       The following portions of the patient's history were reviewed and updated as appropriate: allergies, current medications, past family history, past medical history, past social history, past surgical history and problem list.    Neurological Exam  Mental Status  Awake, alert and oriented to person, place and time. Recent and remote memory are intact. Speech is normal. Language is fluent with no aphasia. Attention and concentration are normal. Fund of knowledge is appropriate for level of education.    Cranial Nerves  CN II: Visual acuity is normal. Visual fields full to confrontation.  CN III, IV, VI: Extraocular movements intact bilaterally. Normal lids and orbits bilaterally. Pupils equal round and reactive to light bilaterally.  CN V: Facial sensation is normal.  CN VII: Full and symmetric facial movement.  CN IX, X: Palate elevates symmetrically  CN XI: Shoulder shrug strength is normal.  CN XII: Tongue midline without atrophy or fasciculations.    Motor  Normal muscle bulk throughout. Normal muscle tone. The following abnormal movements were seen: Mild postural tremor, rare resting component.   Strength is 5/5 in all four extremities except as noted.  Weakness of bilateral hand interossei and APB bilaterally.  Negative Tinel's and Phalen sign.    Reflexes                                            Right                      Left  Brachioradialis                    2+                         2+  Biceps                                 2+                         2+  Triceps                                2+                         2+  Patellar                                2+                         2+  Achilles                                2+                         2+  Right  Plantar: downgoing  Left Plantar: downgoing    Right pathological reflexes: Ankle clonus absent.  Left pathological reflexes: Ankle clonus absent.    Coordination    Finger-to-nose, rapid alternating movements and heel-to-shin normal bilaterally without dysmetria.    Gait   Stooped posture, slowed gait with en bloc turning with the walker.  No freezing through the doorway.      I spent 29 minutes caring for this patient on this date of service. This time includes time spent by me in the following activities as necessary: preparing for the visit, reviewing tests, medical records and previous visits, obtaining and/or reviewing a separately obtained history, performing a medically appropriate exam and/or evaluation, counseling and educating the patient, and/or communicating with other healthcare professionals, documenting information in the medical record, independently interpreting results and communicating that information with the patient, and developing a medically appropriate treatment plan with consideration of other conditions, medications, and treatments.

## 2025-02-20 ENCOUNTER — OFFICE VISIT (OUTPATIENT)
Dept: NEUROLOGY | Facility: CLINIC | Age: 80
End: 2025-02-20
Payer: MEDICARE

## 2025-02-20 VITALS — OXYGEN SATURATION: 100 % | HEART RATE: 100 BPM | DIASTOLIC BLOOD PRESSURE: 60 MMHG | SYSTOLIC BLOOD PRESSURE: 110 MMHG

## 2025-02-20 DIAGNOSIS — R26.9 GAIT DISTURBANCE: ICD-10-CM

## 2025-02-20 DIAGNOSIS — R29.818 PARKINSONIAN FEATURES: Primary | ICD-10-CM

## 2025-02-20 DIAGNOSIS — G56.02 CARPAL TUNNEL SYNDROME OF LEFT WRIST: ICD-10-CM

## 2025-02-20 NOTE — PATIENT INSTRUCTIONS
Sinemet:  Week 1: Take 1 capsule twice daily with meals for one week. You may try taking with meals or with a snack (not heavy in protein)  Week 2: Take 1 capsule three times daily with meals for one week.  Week 3: Continue week 2 dose and call me for an update

## 2025-03-10 ENCOUNTER — OFFICE VISIT (OUTPATIENT)
Age: 80
End: 2025-03-10
Payer: MEDICARE

## 2025-03-10 ENCOUNTER — HOSPITAL ENCOUNTER (OUTPATIENT)
Facility: HOSPITAL | Age: 80
Discharge: HOME | End: 2025-03-10
Admitting: NURSE PRACTITIONER
Payer: MEDICARE

## 2025-03-10 VITALS
BODY MASS INDEX: 29.23 KG/M2 | RESPIRATION RATE: 16 BRPM | WEIGHT: 165 LBS | DIASTOLIC BLOOD PRESSURE: 64 MMHG | HEIGHT: 63 IN | SYSTOLIC BLOOD PRESSURE: 94 MMHG

## 2025-03-10 DIAGNOSIS — I71.43 INFRARENAL ABDOMINAL AORTIC ANEURYSM, WITHOUT RUPTURE: ICD-10-CM

## 2025-03-10 DIAGNOSIS — I71.40 ABDOMINAL ANEURYSM: ICD-10-CM

## 2025-03-10 DIAGNOSIS — I71.40 ABDOMINAL ANEURYSM: Primary | ICD-10-CM

## 2025-03-10 LAB
ABDOMINAL DIST AORTA AP: 4.6 CM
ABDOMINAL DIST AORTA TRANS: 4.6 CM
ABDOMINAL DIST AORTA VEL: 35 CM/S
ABDOMINAL LT COM ILIAC AP: 0.8 CM
ABDOMINAL LT COM ILIAC VEL: 105 CM/S
ABDOMINAL LT EXT ILIAC VEL: 176 CM/S
ABDOMINAL MID AORTA AP: 2 CM
ABDOMINAL MID AORTA TRANS: 1.9 CM
ABDOMINAL MID AORTA VEL: 75 CM/S
ABDOMINAL PROX AORTA VEL: 44 CM/S
ABDOMINAL RT COM ILIAC AP: 0.9 CM
ABDOMINAL RT COM ILIAC TRANS: 0.9 CM
ABDOMINAL RT COM ILIAC VEL: 88 CM/S
ABDOMINAL RT EXT ILIAC VEL: 152 CM/S
BH CV VAS ABD AO LT EXTERNAL ILIAC AP: 0.6 CM
BH CV VAS ABD AO RT EXTERNAL ILIAC AP: 0.7 CM
BH CV VAS ABDOMINAL AO RESIDUAL LUMEN AP MEASURE: 4.6 CM
BH CV VAS ABDOMINAL AO RESIDUAL LUMEN TRANS MEASURE: 4.6 CM

## 2025-03-10 PROCEDURE — 93978 VASCULAR STUDY: CPT | Performed by: SURGERY

## 2025-03-10 PROCEDURE — 3074F SYST BP LT 130 MM HG: CPT | Performed by: NURSE PRACTITIONER

## 2025-03-10 PROCEDURE — 99213 OFFICE O/P EST LOW 20 MIN: CPT | Performed by: NURSE PRACTITIONER

## 2025-03-10 PROCEDURE — 93978 VASCULAR STUDY: CPT

## 2025-03-10 PROCEDURE — 3078F DIAST BP <80 MM HG: CPT | Performed by: NURSE PRACTITIONER

## 2025-03-10 NOTE — PROGRESS NOTES
Chief Complaint  Abdominal aneurysm    Subjective        Kirti Santillan presents to [facility]  HPI   Kirti Santillan is a 79 y.o. female that has been followed in our office for an abdominal aortic aneurysm.  In 2023, she was admitted to the hospital for weakness and falls.  She had imaging that led to an incidental diagnosis of 4.3 cm infrarenal abdominal aortic aneurysm.    She returns today in follow-up along with an aortic duplex. She   reports she  has been doing well without hospitalizations or surgeries. She  denies any worsening abdominal pain, back pain, or pain that radiates to her groin. She denies any claudication symptoms, rest pain, or tissue loss.  On further questioning, it appears as though her sister  of a ruptured aortic aneurysm.    Review of Systems   Constitutional:  Negative for fever.   Eyes:  Negative for visual disturbance.   Cardiovascular:  Negative for leg swelling.   Gastrointestinal:  Negative for abdominal pain.   Musculoskeletal:  Negative for back pain.   Skin:  Negative for color change, pallor and wound.   Neurological:  Negative for dizziness, facial asymmetry, speech difficulty and weakness.        Kirti Santillan  reports that she has quit smoking. Her smoking use included cigarettes. She has been exposed to tobacco smoke. She has never used smokeless tobacco..        Objective   Vital Signs:  Vitals:    03/10/25 1004   BP: 94/64   Resp: 16        Body mass index is 29.24 kg/m².           Physical Exam  Vitals reviewed.   Constitutional:       Appearance: Normal appearance.   HENT:      Head: Normocephalic.   Cardiovascular:      Rate and Rhythm: Normal rate and regular rhythm.      Pulses: Normal pulses.           Dorsalis pedis pulses are 3+ on the right side and 3+ on the left side.        Posterior tibial pulses are 3+ on the right side and 3+ on the left side.   Pulmonary:      Effort: Pulmonary effort is normal.   Skin:      General: Skin is warm.   Neurological:      General: No focal deficit present.      Mental Status: She is alert and oriented to person, place, and time.   Psychiatric:         Mood and Affect: Mood normal.          Result Review :    Consults by Sherman Wilson MD (11/01/2023 10:12)     CT Angiogram Abdomen Pelvis (10/31/2023 16:07)       Aortic duplex done today: Duplex Aorta IVC Iliac Graft Complete CAR (03/10/2025 09:47)                  Assessment and Plan     Diagnoses and all orders for this visit:    1. Abdominal aneurysm (Primary)  -     Duplex Aorta IVC Iliac Graft Complete CAR; Future    2. Infrarenal abdominal aortic aneurysm, without rupture  -     Duplex Aorta IVC Iliac Graft Complete CAR; Future          Patient presents today for follow up of her abdominal aortic aneurysm.  This is stable at 4.6 cm.  We discussed indications for surgical repair once it reaches 5 cm.  We did discuss the familiar component of aneurysmal disease and I recommended that her brother and son be screened as her sister did die of a ruptured aneurysm per her account.  We discussed adequate blood pressure control.  She is on a statin for cholesterol control.  She  will return in 6 months along with aortic duplex.            Follow Up     Return in about 6 months (around 9/10/2025) for aortic duplex.  Patient was given instructions and counseling regarding her condition or for health maintenance advice. Please see specific information pulled into the AVS if appropriate.     HUI Pang

## 2025-03-12 ENCOUNTER — OFFICE VISIT (OUTPATIENT)
Dept: ENDOCRINOLOGY | Age: 80
End: 2025-03-12
Payer: MEDICARE

## 2025-03-12 VITALS
SYSTOLIC BLOOD PRESSURE: 130 MMHG | HEIGHT: 63 IN | WEIGHT: 164.2 LBS | OXYGEN SATURATION: 95 % | HEART RATE: 85 BPM | DIASTOLIC BLOOD PRESSURE: 80 MMHG | BODY MASS INDEX: 29.09 KG/M2

## 2025-03-12 DIAGNOSIS — M81.0 OSTEOPOROSIS, UNSPECIFIED OSTEOPOROSIS TYPE, UNSPECIFIED PATHOLOGICAL FRACTURE PRESENCE: Primary | ICD-10-CM

## 2025-03-12 DIAGNOSIS — E21.3 HYPERPARATHYROIDISM: ICD-10-CM

## 2025-03-12 NOTE — PROGRESS NOTES
Chief complaint/Reason for consult: osteoporosis    HPI:   - 79 year old female here for osteoporosis  - Last seen in 3/2024  - Prior fragility fractures: none  - Prior/current treatments for osteoporosis: none  - Risk factors for osteoporosis/bone loss: hyperparathyroidism  - Family history of osteoporosis: none  - Vitamin D and calcium intake: she states she does not take calcium or vitamin D even though vitamin D 50,000 IU weekly is on her med list  - Physical activity: walks with walker  - Last DXA: 12/2023 showing a most severe T-score of -2.9 in the left hip  - Denies kidney stones    The following portions of the patient's history were reviewed and updated as appropriate: allergies, current medications, past family history, past medical history, past social history, past surgical history, and problem list.      Objective     Vitals:    03/12/25 1240   BP: 130/80   Pulse: 85   SpO2: 95%        Physical Exam  Vitals reviewed.   Constitutional:       Appearance: Normal appearance.   HENT:      Head: Normocephalic and atraumatic.   Eyes:      General: No scleral icterus.  Pulmonary:      Effort: Pulmonary effort is normal. No respiratory distress.   Neurological:      Mental Status: She is alert.      Gait: Gait normal.   Psychiatric:         Mood and Affect: Mood normal.         Behavior: Behavior normal.         Thought Content: Thought content normal.         Judgment: Judgment normal.         Assessment & Plan   Osteoporosis  Hyperparathyroidism  - She has labs ordered for next week  - Depending on what her labs show I will likely start alendronate     - Return to clinic in 1 year

## 2025-03-17 DIAGNOSIS — R26.9 GAIT DISTURBANCE: ICD-10-CM

## 2025-03-17 DIAGNOSIS — R29.818 PARKINSONIAN FEATURES: ICD-10-CM

## 2025-03-17 RX ORDER — CARBIDOPA AND LEVODOPA 25; 100 MG/1; MG/1
1 TABLET ORAL 2 TIMES DAILY
Qty: 60 TABLET | Refills: 2 | Status: SHIPPED | OUTPATIENT
Start: 2025-03-17 | End: 2025-03-20

## 2025-03-18 ENCOUNTER — TELEPHONE (OUTPATIENT)
Dept: NEUROLOGY | Facility: CLINIC | Age: 80
End: 2025-03-18
Payer: MEDICARE

## 2025-03-18 DIAGNOSIS — R29.818 PARKINSONIAN FEATURES: ICD-10-CM

## 2025-03-18 DIAGNOSIS — R26.9 GAIT DISTURBANCE: ICD-10-CM

## 2025-03-18 NOTE — TELEPHONE ENCOUNTER
Caller: Kirti Santillan    Relationship: Self    Best call back number: [phone]    Which medication are you concerned about:   carbidopa-levodopa (SINEMET)  MG per tablet     Who prescribed you this medication:   DR WHITMORE    PT WAS TOLD TO CALL BACK TO LET DR WHITMORE KNOW HOW THE RX WAS WORKING. PT SAID THE RX IS WORKING FOR HER AND WANTS TO KNOW IF SHE SHOULD CONTINUE THIS RX.    PLEASE CALL PT BACK TO DISCUSS THIS RX.

## 2025-03-20 DIAGNOSIS — R26.9 GAIT DISTURBANCE: ICD-10-CM

## 2025-03-20 DIAGNOSIS — R29.818 PARKINSONIAN FEATURES: ICD-10-CM

## 2025-03-20 RX ORDER — CARBIDOPA AND LEVODOPA 25; 100 MG/1; MG/1
1 TABLET ORAL 3 TIMES DAILY
Qty: 90 TABLET | Refills: 2 | Status: SHIPPED | OUTPATIENT
Start: 2025-03-20

## 2025-03-20 NOTE — TELEPHONE ENCOUNTER
Spoke with patient, she is tolerating Carbidopa Levodopa TID well. She is taking it with food and is experiencing no side effects. Will continue with this dose. A new prescription was sent.

## 2025-03-26 ENCOUNTER — OFFICE VISIT (OUTPATIENT)
Dept: INTERNAL MEDICINE | Facility: CLINIC | Age: 80
End: 2025-03-26
Payer: MEDICARE

## 2025-03-26 VITALS
BODY MASS INDEX: 29.06 KG/M2 | OXYGEN SATURATION: 96 % | HEIGHT: 63 IN | WEIGHT: 164 LBS | HEART RATE: 84 BPM | DIASTOLIC BLOOD PRESSURE: 70 MMHG | SYSTOLIC BLOOD PRESSURE: 130 MMHG

## 2025-03-26 DIAGNOSIS — H61.21 IMPACTED CERUMEN OF RIGHT EAR: ICD-10-CM

## 2025-03-26 DIAGNOSIS — E11.65 TYPE 2 DIABETES MELLITUS WITH HYPERGLYCEMIA, WITHOUT LONG-TERM CURRENT USE OF INSULIN: ICD-10-CM

## 2025-03-26 DIAGNOSIS — I10 PRIMARY HYPERTENSION: Primary | ICD-10-CM

## 2025-03-26 DIAGNOSIS — M48.062 LUMBAR STENOSIS WITH NEUROGENIC CLAUDICATION: ICD-10-CM

## 2025-03-26 DIAGNOSIS — M81.6 LOCALIZED OSTEOPOROSIS, UNSPECIFIED PATHOLOGICAL FRACTURE PRESENCE: ICD-10-CM

## 2025-03-26 RX ORDER — AMLODIPINE BESYLATE 5 MG/1
5 TABLET ORAL DAILY
Qty: 90 TABLET | Refills: 1 | Status: SHIPPED | OUTPATIENT
Start: 2025-03-26

## 2025-03-26 RX ORDER — ALENDRONATE SODIUM 70 MG/1
70 TABLET ORAL
Qty: 12 TABLET | Refills: 3 | Status: SHIPPED | OUTPATIENT
Start: 2025-03-26 | End: 2026-03-26

## 2025-03-26 NOTE — PROGRESS NOTES
"Chief Complaint  No chief complaint on file.    Subjective        Kirti Santillan presents to [facility]  History of Present Illness    Presents to clinic today for follow-up    Overall she reports she is doing well, she has no specific new complaints concerns beside some ear fullness and concern for earwax.    She did bring her blood pressure cuff and she has been monitoring at home somewhat regularly, reports she has not had significantly elevated readings since starting monitoring.    Objective   Vital Signs:  /70   Pulse 84   Ht 160 cm (62.99\")   Wt 74.4 kg (164 lb)   SpO2 96%   BMI 29.06 kg/m²   Estimated body mass index is 29.06 kg/m² as calculated from the following:    Height as of this encounter: 160 cm (62.99\").    Weight as of this encounter: 74.4 kg (164 lb).          Physical Exam  Vitals reviewed.   Constitutional:       General: She is not in acute distress.     Appearance: Normal appearance. She is not toxic-appearing.   HENT:      Head: Normocephalic and atraumatic.      Right Ear: There is impacted cerumen.      Left Ear: There is impacted cerumen.   Eyes:      General: No scleral icterus.     Conjunctiva/sclera: Conjunctivae normal.   Skin:     General: Skin is warm and dry.   Neurological:      Mental Status: She is alert and oriented to person, place, and time.      Motor: Weakness present.      Gait: Gait abnormal.   Psychiatric:         Mood and Affect: Mood normal.         Behavior: Behavior normal.        Result Review :         Ear Cerumen Removal    Date/Time: 3/26/2025 7:56 PM    Performed by: Deangelo Sprague MD  Authorized by: Deangelo Sprague MD  Consent: Verbal consent obtained  Consent given by: patient  Patient understanding: patient states understanding of the procedure being performed  Patient consent: the patient's understanding of the procedure matches consent given  Patient identity confirmed: verbally with patient    Anesthesia:  Local " Anesthetic: none  Location details: right ear  Patient tolerance: patient tolerated the procedure well with no immediate complications  Procedure type: irrigation   Sedation:  Patient sedated: no              Assessment and Plan   Diagnoses and all orders for this visit:    1. Primary hypertension (Primary)  -     amLODIPine (NORVASC) 5 MG tablet; Take 1 tablet by mouth Daily.  Dispense: 90 tablet; Refill: 1    2. Lumbar stenosis with neurogenic claudication    3. Localized osteoporosis, unspecified pathological fracture presence    4. Impacted cerumen of right ear  -     Ear Cerumen Removal    5. Type 2 diabetes mellitus with hyperglycemia, without long-term current use of insulin      BP seems to be very well controlled on amlodipine, today 130/70 and repaet with home BP cuff being 120/80. Will continue this    Ear irrigation performed today    Will inform Dr. Borges who she is seeing for osteoporosis of labs and tenative plan to initiate fosmax per his documentation. I have sent him lab results to inform him    Diabetes slightly worse from 4mo ago, but still well within goal with A1c being <8%. Diet controlled     RTC in 6mo or sooner prn         Follow Up   Return in about 6 months (around 9/26/2025).  Patient was given instructions and counseling regarding her condition or for health maintenance advice. Please see specific information pulled into the AVS if appropriate.

## 2025-05-07 ENCOUNTER — OFFICE VISIT (OUTPATIENT)
Dept: CARDIOLOGY | Age: 80
End: 2025-05-07
Payer: MEDICARE

## 2025-05-07 VITALS
BODY MASS INDEX: 30.36 KG/M2 | HEART RATE: 83 BPM | DIASTOLIC BLOOD PRESSURE: 76 MMHG | HEIGHT: 62 IN | WEIGHT: 165 LBS | SYSTOLIC BLOOD PRESSURE: 124 MMHG

## 2025-05-07 DIAGNOSIS — R73.03 PREDIABETES: ICD-10-CM

## 2025-05-07 DIAGNOSIS — M48.061 SPINAL STENOSIS OF LUMBAR REGION, UNSPECIFIED WHETHER NEUROGENIC CLAUDICATION PRESENT: ICD-10-CM

## 2025-05-07 DIAGNOSIS — M79.89 LEG SWELLING: ICD-10-CM

## 2025-05-07 DIAGNOSIS — I82.502 CHRONIC DEEP VEIN THROMBOSIS (DVT) OF LEFT LOWER EXTREMITY, UNSPECIFIED VEIN: ICD-10-CM

## 2025-05-07 DIAGNOSIS — G89.29 CHRONIC LOW BACK PAIN, UNSPECIFIED BACK PAIN LATERALITY, UNSPECIFIED WHETHER SCIATICA PRESENT: ICD-10-CM

## 2025-05-07 DIAGNOSIS — M54.16 LUMBAR RADICULOPATHY: ICD-10-CM

## 2025-05-07 DIAGNOSIS — I71.40 ABDOMINAL ANEURYSM: ICD-10-CM

## 2025-05-07 DIAGNOSIS — M54.50 CHRONIC LOW BACK PAIN, UNSPECIFIED BACK PAIN LATERALITY, UNSPECIFIED WHETHER SCIATICA PRESENT: ICD-10-CM

## 2025-05-07 DIAGNOSIS — I10 PRIMARY HYPERTENSION: Primary | ICD-10-CM

## 2025-05-07 NOTE — PROGRESS NOTES
CARDIOLOGY    Tang Cyr MD    ENCOUNTER DATE:  05/07/25    Kirti Santillan / 79 y.o. / female        CHIEF COMPLAINT / REASON FOR OFFICE VISIT     Hypertension      HISTORY OF PRESENT ILLNESS       Hypertension  Associated symptoms: no chest pain, no orthopnea, no palpitations, no PND, no shortness of breath and no dizziness        Kirti Santillan is a 79 y.o. female with hypertension, spinal stenosis, overweight who presents for clinic follow-up    Summary of cardiovascular history:  - 4/2024: Nuclear SPECT showed a moderate-sized reversible perfusion defect in the inferior and inferolateral wall.  She established care in cardiology clinic same month and by that time her chest pain had resolved.  She was otherwise nonambulatory and wheelchair dependent.  Left heart catheterization was discussed and patient was not interested especially given poor functional status.  Medical therapy was pursued, and low-dose aspirin as well as atorvastatin 10 were started at that time.  She was also diagnosed on venous duplex in 6/2024 and started on Eliquis 5 bid.  - 10/2024: Last clinic visit, doing okay.  LP(a) quite elevated and atorvastatin was uptitrated to 20 daily.    Accompanied by  at the visit today who contributed to medical history. Today, patient has no acute complaints. No recurrent chest pain. Still non-ambulatory and  pushes her on a wheeled walker. Chronic foot swelling which is stable. Denies dyspnea on exertion, palpitation, orthopnea, PND, dizziness, syncope, or unexpected falls. No bleeding on Eliquis 5 bid. Does not recall having had a heart attack or stroke. Former smoker, 1 ppd x 30 years and quit around age 49. Denies alcohol or substance.    REVIEW OF SYSTEMS     Review of Systems   Constitutional: Negative for weight loss.   Cardiovascular:  Positive for leg swelling. Negative for chest pain, dyspnea on exertion, orthopnea, palpitations, paroxysmal nocturnal dyspnea and syncope.    Respiratory:  Negative for shortness of breath.    Hematologic/Lymphatic: Negative for bleeding problem.   Musculoskeletal:  Negative for falls.   Gastrointestinal:  Negative for hematochezia and melena.   Genitourinary:  Negative for hematuria.   Neurological:  Positive for loss of balance and weakness. Negative for dizziness and light-headedness.   Psychiatric/Behavioral:  Negative for altered mental status.        MEDICATIONS      Outpatient Encounter Medications as of 5/7/2025   Medication Sig Dispense Refill    amLODIPine (NORVASC) 5 MG tablet Take 1 tablet by mouth Daily. 90 tablet 1    apixaban (ELIQUIS) 5 MG tablet tablet Take 1 tablet by mouth Every 12 (Twelve) Hours. 180 tablet 0    atorvastatin (LIPITOR) 20 MG tablet Take 1 tablet by mouth Daily. 90 tablet 1    ergocalciferol (ERGOCALCIFEROL) 1.25 MG (64756 UT) capsule Take 1 capsule by mouth 1 (One) Time Per Week. 5 capsule 3    gabapentin (NEURONTIN) 300 MG capsule Take 1 capsule by mouth 3 (Three) Times a Day. 270 capsule 0    alendronate (Fosamax) 70 MG tablet Take 1 tablet by mouth Every 7 (Seven) Days. Must sit or stand for 30 minutes after taking medication (Patient not taking: Reported on 5/7/2025) 12 tablet 3    [DISCONTINUED] aspirin 81 MG EC tablet Take 1 tablet by mouth Daily. (Patient not taking: Reported on 5/7/2025) 90 tablet 3    [DISCONTINUED] carbidopa-levodopa (SINEMET)  MG per tablet Take 1 tablet by mouth 3 (Three) Times a Day. (Patient not taking: Reported on 5/7/2025) 90 tablet 2    [DISCONTINUED] furosemide (LASIX) 20 MG tablet Take 1 tablet by mouth Daily. (Patient not taking: Reported on 5/7/2025) 30 tablet 0    [DISCONTINUED] vitamin D (ERGOCALCIFEROL) 1.25 MG (10503 UT) capsule capsule Take 1 capsule by mouth 1 (One) Time Per Week. (Patient not taking: Reported on 5/7/2025) 8 capsule 0     No facility-administered encounter medications on file as of 5/7/2025.         VITAL SIGNS     Visit Vitals  /76   Pulse 83  "  Ht 157.5 cm (62\")   Wt 74.8 kg (165 lb)   LMP  (LMP Unknown)   BMI 30.18 kg/m²         Wt Readings from Last 3 Encounters:   05/07/25 74.8 kg (165 lb)   03/26/25 74.4 kg (164 lb)   03/12/25 74.5 kg (164 lb 3.2 oz)     Body mass index is 30.18 kg/m².      PHYSICAL EXAMINATION     Vitals and nursing note reviewed.   Constitutional:       Appearance: Not in distress. Frail. Chronically ill-appearing.      Comments: Morbidly obese   Neck:      Comments: JVD difficult to assess due to body habitus  Pulmonary:      Effort: Pulmonary effort is normal.   Cardiovascular:      Normal rate. Regular rhythm.      Murmurs: There is no murmur.   Edema:     Peripheral edema present.     Thigh: bilateral 2+ edema of the thigh.  Abdominal:      General: There is distension.      Palpations: Abdomen is soft.      Tenderness: There is no abdominal tenderness.   Skin:     General: Skin is cool.   Neurological:      Mental Status: Alert and oriented to person, place and time.         REVIEWED DATA       ECG 12 Lead    Date/Time: 5/7/2025 1:40 PM  Performed by: Tang Cyr MD    Authorized by: Tang Cyr MD  Comparison: compared with previous ECG from 4/23/2024  Similar to previous ECG  Rhythm: sinus rhythm  Q waves: III and aVF      Clinical impression: non-specific ECG      Lab Results   Component Value Date    WBC 7.70 03/19/2025    HGB 14.3 03/19/2025    HCT 45.3 03/19/2025    MCV 90.1 03/19/2025     03/19/2025       Lab Results   Component Value Date    HGBA1C 6.70 (H) 03/19/2025       Lab Results   Component Value Date    GLUCOSE 133 (H) 03/19/2025    BUN 13 03/19/2025    CREATININE 0.76 03/19/2025    EGFR 79.3 03/19/2025    BCR 17.1 03/19/2025    K 4.3 03/19/2025    CO2 29.3 (H) 03/19/2025    CALCIUM 10.8 (H) 03/19/2025    ALBUMIN 3.9 03/19/2025    BILITOT 0.6 03/19/2025    AST 12 03/19/2025    ALT 8 03/19/2025       Lab Results   Component Value Date    CHOL 186 10/29/2024    CHLPL 130 03/19/2025    TRIG 69 03/19/2025 "    HDL 49 03/19/2025    LDL 67 03/19/2025       Results for orders placed during the hospital encounter of 11/15/24    Adult Transthoracic Echo Complete w/ Color, Spectral and Contrast if Necessary Per Protocol    Interpretation Summary    Left ventricular systolic function is normal. Left ventricular ejection fraction appears to be 61 - 65%.    Left ventricular diastolic function was normal.        ASSESSMENT & PLAN     Diagnoses and all orders for this visit:    1. Primary hypertension (Primary)    2. Abdominal aneurysm    3. Prediabetes    4. Chronic deep vein thrombosis (DVT) of left lower extremity, unspecified vein    5. Chronic low back pain, unspecified back pain laterality, unspecified whether sciatica present    6. Spinal stenosis of lumbar region, unspecified whether neurogenic claudication present    7. Lumbar radiculopathy      Abnormal nuclear med stress: Nuclear SPECT last year 4/2024 showed a moderate-sized reversible perfusion defect in the inferior and inferolateral wall.  Patient reported atypical chest pain that felt like muscle pull, which had completely resolved and not recurred.  Her EKG in office today showed NSR with inferior Q waves, unchanged from prior.  We again discussed left heart catheterization and she remains uninterested.  We will defer invasive cath indefinitely at this time given resolution of symptoms and significant frailty predisposing to fall and bleeding.  Focus on risk factor modifications, see below.  Leg swelling: Significant bilateral leg edema but stable, likely in part due to DVT as well as amlodipine therapy as below.  He had echo done last year 11/2024 that was reassuring.  Continue to monitor.  Hypertension: In office /76, well-controlled, HR 83.  She is resumed on amlodipine 5 daily and tolerating well, emphasized fall precaution  Hyperlipidemia: CTA abdomen pelvis in 10/2023 independently reviewed by me, which showed significant atherosclerotic changes in  the abdominal aorta associated with focal aneurysm measuring 4.1cm x 4.3cm, although the proximal coronary arteries appeared open even though it was non-gated.  Lipid panel earlier this year 3/2025 showed HDL 49, LDL 67, well-controlled.  Continue atorvastatin 20 daily (uptitrated since last visit due to LP(a) level elevation).  DVT: Diagnosed on venous duplex in 6/2024. On Eliquis 5 bid, tolerating well but she is at high fall risk.  She has been on therapy for a whole year, we will repeat venous duplex and depending on results, we will coordinate with PCP Dr. Sprague on whether to continue her on Eliquis  DM2: Hemoglobin A1c 6.7 earlier this year 3/2025, gotten worse.  Will focus on lifestyle modification  Obesity: BMI 30. Discussed lifestyle modification including dietary changes to assist with weight loss.  Spinal stenosis: Again strongly emphasized fall precaution.    I spent 31 minutes caring for Kirti on this date of service. This time includes time spent by me in the following activities: preparing for the visit, reviewing tests, obtaining and/or reviewing a separately obtained history, performing a medically appropriate examination and/or evaluation, counseling and educating the patient/family/caregiver, and ordering medications, tests, or procedures.     Additionally, I am providing longitudinal care which includes continuously being a focal point for all of the patient's health care services and ongoing medical care related to hyperlipidemia as documented above.    Tang Cyr MD. PhD. [facility]  05/07/25  13:20 EDT    Part of this note may be an electronic transcription/translation of spoken language to printed text using the Dragon dictation system.

## 2025-05-29 ENCOUNTER — OFFICE VISIT (OUTPATIENT)
Dept: NEUROLOGY | Facility: CLINIC | Age: 80
End: 2025-05-29
Payer: MEDICARE

## 2025-05-29 VITALS
BODY MASS INDEX: 30.17 KG/M2 | SYSTOLIC BLOOD PRESSURE: 110 MMHG | HEIGHT: 62 IN | OXYGEN SATURATION: 94 % | HEART RATE: 87 BPM | DIASTOLIC BLOOD PRESSURE: 78 MMHG

## 2025-05-29 DIAGNOSIS — M48.061 SPINAL STENOSIS OF LUMBAR REGION, UNSPECIFIED WHETHER NEUROGENIC CLAUDICATION PRESENT: ICD-10-CM

## 2025-05-29 DIAGNOSIS — R29.818 PARKINSONIAN FEATURES: Primary | ICD-10-CM

## 2025-05-29 DIAGNOSIS — R26.9 GAIT DISTURBANCE: ICD-10-CM

## 2025-05-29 DIAGNOSIS — G56.02 CARPAL TUNNEL SYNDROME OF LEFT WRIST: ICD-10-CM

## 2025-05-29 RX ORDER — CARBIDOPA AND LEVODOPA 25; 100 MG/1; MG/1
2 TABLET ORAL 3 TIMES DAILY
Qty: 180 TABLET | Refills: 2 | Status: SHIPPED | OUTPATIENT
Start: 2025-05-29

## 2025-05-29 NOTE — PROGRESS NOTES
[facility] Neurology   Patient ID: Kirti Santillan  Age: 79 y.o.   Chief compliant:   Chief Complaint   Patient presents with   • Gait Problem     Julio is with patient (brother of patient)        Portions of this assessment have been copied from previous documentation which has been thoroughly reviewed and updated as appropriate.    Assessment/Plan:    Kirti Santillan is a 79 y.o. female who presents with concerns for gait difficulty, slowness of gait and freezing. Patient does have significantly stooped posture with en bloc turning as well as mild masked facies.  There are some mild rigidity in the upper extremities and a mixed resting/action tremor noted predominantly of the right upper extremity.  Her examination does appear parkinsonian, although somewhat contributed by arthritic disease of the back and spine to some extent.  Mild improvement on low-dose Sinemet predominantly with her tremor.  Further adjustment has been limited by nausea which has improved by taking the medication with meals.  Will further optimize this medication for diagnostic clarity and treatment. Pending response, may consider skin biopsy for diagnostic confirmation before pursuing alternative treatment rounds.     She has additional concerns on examination regarding left lower extremity numbness with pain limited weakness and bilateral hip flexion, as well as weakness in handgrip with noted atrophy of APB grossly bilaterally, although more so bothersome to the left for patient.  This appears consistent with carpal tunnel syndrome.  She is not interested with a referral to a hand surgeon or EMG for diagnostic clarification.  She has been seen with neurosurgery who did not recommend intervention for her lumbar radiculopathy.         Diagnoses and all orders for this visit:    1. Parkinsonian features (Primary)  -     carbidopa-levodopa (Sinemet)  MG per tablet; Take 2 tab in AM, 1 tab with lunch, and 1 tab with supper for 1 week,  THEN take 2 tab in AM, 2 tab with lunch, and 1 tab with supper for 1 week, THEN take 2 tabs in AM, 2 tab with lunch, and 2 tab with supper  Dispense: 180 tablet; Refill: 2  -     Ambulatory Referral to Home Health    2. Gait disturbance  -     carbidopa-levodopa (Sinemet)  MG per tablet; Take 2 tab in AM, 1 tab with lunch, and 1 tab with supper for 1 week, THEN take 2 tab in AM, 2 tab with lunch, and 1 tab with supper for 1 week, THEN take 2 tabs in AM, 2 tab with lunch, and 2 tab with supper  Dispense: 180 tablet; Refill: 2  -     Ambulatory Referral to Home Health    3. Spinal stenosis of lumbar region, unspecified whether neurogenic claudication present  -     Ambulatory Referral to Home Health    4. Carpal tunnel syndrome of left wrist         Plan:  Further increase of Sinemet as ordered  Home PT    Gennaro Grant MD          Interval history (5/29/2025):   She successfully increased Sinemet from twice a day to 3 times a day by taking with meals.  Feels tremor has improved.  Otherwise is not aware of any benefits.  Her mobility remains poor.  Brother raises concerns that she is often in her chair.  She is able to transfer herself to the toilet to the shower on her own, but otherwise Allfen is in her chair.  She attributes difficulty walking due to her left leg being numb and her legs getting sore if walking for a long time.  Denies any falls.     Interval history (2/20/2025):   No new concerns. She has gone up to Sinemet 1 tablet  twice a day briefly. She took it about 20 minutes after eating. She noticed nausea and vomited once with the second dose, so she stopped and has been taking this once daily.  She thinks tremor has gotten better. It is mostly in right hand. Today notes it more at rest.   She is on gabapentin for back pain. It is in both sides. No neck pain. No surgical history.   Left hand first 3 fingers are numb. Trouble closing her hand. She thinks the right hand is starting to get numb  and weak as well. Numbness in the left leg. No bowel or bladder trouble.      Initial HPI (9/24/2024):  Kirti Santillan is a 78 y.o. female with history of HTN, prediabetes, lumbar spinal stenosis.  She was last seen by her PCP on 7/10/2024.  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  Appears in the interim after conversations between physical therapy and her PCP, she was referred to neurology for gait abnormalities. She has previously seen neurosurgery in the hospital on 11/1/2023 for lumbar spinal stenosis and gait difficulty. She received an epidural injection and had improvement in her pain, but noted to have shuffled and forward flexed posture. She followed up with Dr. Giles in clinic.     She reports concerns about her legs swelling over recent months, worse on the left. Potentially better since starting the blood thinner. She does not think she is taking Lasix. She feels like the left leg doesn't have any feeling, all the way down. It is tingling sensation. She has been on gabapentin 300 mg TID which has helped her pain significantly. Denies any ongoing back pain. She did get two epidurals which were painful and didn't help. She can stand, but reports her legs won't move at times. She has a potty chair, but has trouble getting there.      She reports her legs get stuck while walking, particularly her left leg. She notices shuffling her feet. She reports having a tremor particularly with activities. Handwriting is very small and can't form letters properly. She has had multiple falls. She needs the walker for support. She reports her movements are slower.  Smell is fine. Sleeps okay except waking to get to the bathroom. No odd sleep behaviors. No lightheadedness on standing.      For the  last year, she denies having any feeling in her 1st 3 fingers on her left hand, occasionally similar on the 4th finger. Reports hand  is weak.     No family history of any neurological issues.         Vitals:    05/29/25 1131   BP: 110/78   Pulse: 87   SpO2: 94%       The following portions of the patient's history were reviewed and updated as appropriate: allergies, current medications, past family history, past medical history, past social history, past surgical history and problem list.    Neurological Exam  Mental Status  Awake, alert and oriented to person, place and time. Recent and remote memory are intact. Speech is normal. Language is fluent with no aphasia. Attention and concentration are normal. Fund of knowledge is appropriate for level of education.    Cranial Nerves  CN II: Visual acuity is normal. Visual fields full to confrontation.  CN III, IV, VI: Extraocular movements intact bilaterally. Normal lids and orbits bilaterally. Pupils equal round and reactive to light bilaterally.  CN V: Facial sensation is normal.  CN VII: Full and symmetric facial movement.  CN IX, X: Palate elevates symmetrically  CN XI: Shoulder shrug strength is normal.  CN XII: Tongue midline without atrophy or fasciculations.    Motor  Normal muscle bulk throughout. Increased tone RUE>LUE. The following abnormal movements were seen: Mild postural tremor, rare resting component of right upper extremity.   Strength is 5/5 in all four extremities except as noted.  Weakness of bilateral hand interossei and APB bilaterally.  Pain limited weakness at hip flexion.  Otherwise grossly full strength in the lower extremities.    Reflexes                                            Right                      Left  Brachioradialis                    1+                         1+  Biceps                                 1+                         1+  Triceps                                1+                         1+  Patellar                                 1+                         1+  Achilles                                1+                         1+  Right Plantar: downgoing  Left Plantar: downgoing    Right pathological reflexes: Ankle clonus absent.  Left pathological reflexes: Ankle clonus absent.    Coordination    Finger-to-nose, rapid alternating movements and heel-to-shin normal bilaterally without dysmetria.    Gait   Stooped posture, slowed gait with en bloc turning with the walker.  No freezing through the doorway.      I spent 32 minutes caring for this patient on this date of service. This time includes time spent by me in the following activities as necessary: preparing for the visit, reviewing tests, medical records and previous visits, obtaining and/or reviewing a separately obtained history, performing a medically appropriate exam and/or evaluation, counseling and educating the patient, and/or communicating with other healthcare professionals, documenting information in the medical record, independently interpreting results and communicating that information with the patient, and developing a medically appropriate treatment plan with consideration of other conditions, medications, and treatments.

## 2025-05-29 NOTE — PATIENT INSTRUCTIONS
Sinemet instructions: Take 2 tabs in the AM, 1 in the tab with lunch, and 1 tab with supper for 1 week, THEN take 2 tabs in the AM, 2 in the tabs with lunch, and 1 tab with supper for 1 week, take 2 tabs in the AM, 2 in the tabs with lunch, and 2 tabs with supper

## 2025-06-09 DIAGNOSIS — M54.16 LUMBAR RADICULOPATHY: ICD-10-CM

## 2025-06-09 RX ORDER — GABAPENTIN 300 MG/1
300 CAPSULE ORAL 3 TIMES DAILY
Qty: 270 CAPSULE | Refills: 0 | Status: SHIPPED | OUTPATIENT
Start: 2025-06-09

## 2025-07-09 ENCOUNTER — TELEPHONE (OUTPATIENT)
Dept: NEUROLOGY | Facility: CLINIC | Age: 80
End: 2025-07-09
Payer: MEDICARE

## 2025-07-09 NOTE — TELEPHONE ENCOUNTER
Caller: [facility]    Best call back number: [phone]     What was the call regarding: [facility] IS CALLING DUE THEY SENT THE HOME HEALTH ORDERS BACK TO THE OFFICE AND THEY NEED IT SIGNED OFF AND FAXED BACK   ORDER #[id number]  FAX: [phone]    PLEASE REVIEW  THANK YOU

## 2025-07-24 ENCOUNTER — TELEPHONE (OUTPATIENT)
Dept: NEUROLOGY | Facility: CLINIC | Age: 80
End: 2025-07-24
Payer: MEDICARE

## 2025-07-24 NOTE — TELEPHONE ENCOUNTER
Sulma with [facility] home health called regarding orders that had been faxed over. She was following up to see if orders have been signed. She hasn't received them back.     Sulma  Phone - [phone]  Fax - [phone]

## 2025-08-27 DIAGNOSIS — E78.5 HYPERLIPIDEMIA, UNSPECIFIED HYPERLIPIDEMIA TYPE: ICD-10-CM

## 2025-08-27 RX ORDER — ATORVASTATIN CALCIUM 20 MG/1
20 TABLET, FILM COATED ORAL DAILY
Qty: 90 TABLET | Refills: 1 | Status: SHIPPED | OUTPATIENT
Start: 2025-08-27

## (undated) DEVICE — GLV SURG TRIUMPH PF LTX 7 STRL

## (undated) DEVICE — NDL EPID TUOHY 18G 31/2IN

## (undated) DEVICE — NEEDLE, QUINCKE, 22GX5": Brand: MEDLINE

## (undated) DEVICE — EPIDURAL TRAY: Brand: MEDLINE INDUSTRIES, INC.

## (undated) DEVICE — Device: Brand: PORTEX